# Patient Record
Sex: MALE | Race: WHITE | Employment: OTHER | URBAN - METROPOLITAN AREA
[De-identification: names, ages, dates, MRNs, and addresses within clinical notes are randomized per-mention and may not be internally consistent; named-entity substitution may affect disease eponyms.]

---

## 2020-12-06 ENCOUNTER — APPOINTMENT (OUTPATIENT)
Dept: GENERAL RADIOLOGY | Age: 75
DRG: 234 | End: 2020-12-06
Payer: MEDICARE

## 2020-12-06 ENCOUNTER — HOSPITAL ENCOUNTER (INPATIENT)
Age: 75
LOS: 6 days | Discharge: HOME HEALTH | DRG: 234 | End: 2020-12-14
Attending: EMERGENCY MEDICINE | Admitting: THORACIC SURGERY (CARDIOTHORACIC VASCULAR SURGERY)
Payer: MEDICARE

## 2020-12-06 PROBLEM — R07.9 CHEST PAIN: Status: ACTIVE | Noted: 2020-12-06

## 2020-12-06 LAB
ALBUMIN SERPL-MCNC: 3.5 G/DL (ref 3.5–5.2)
ALP BLD-CCNC: 193 U/L (ref 40–129)
ALT SERPL-CCNC: 35 U/L (ref 0–40)
ANION GAP SERPL CALCULATED.3IONS-SCNC: 9 MMOL/L (ref 7–16)
AST SERPL-CCNC: 24 U/L (ref 0–39)
BASOPHILS ABSOLUTE: 0.06 E9/L (ref 0–0.2)
BASOPHILS RELATIVE PERCENT: 0.4 % (ref 0–2)
BILIRUB SERPL-MCNC: <0.2 MG/DL (ref 0–1.2)
BUN BLDV-MCNC: 15 MG/DL (ref 8–23)
CALCIUM SERPL-MCNC: 9.4 MG/DL (ref 8.6–10.2)
CHLORIDE BLD-SCNC: 104 MMOL/L (ref 98–107)
CO2: 26 MMOL/L (ref 22–29)
CREAT SERPL-MCNC: 0.9 MG/DL (ref 0.7–1.2)
EOSINOPHILS ABSOLUTE: 0.38 E9/L (ref 0.05–0.5)
EOSINOPHILS RELATIVE PERCENT: 2.8 % (ref 0–6)
GFR AFRICAN AMERICAN: >60
GFR NON-AFRICAN AMERICAN: >60 ML/MIN/1.73
GLUCOSE BLD-MCNC: 146 MG/DL (ref 74–99)
HCT VFR BLD CALC: 35 % (ref 37–54)
HEMOGLOBIN: 11.6 G/DL (ref 12.5–16.5)
IMMATURE GRANULOCYTES #: 0.07 E9/L
IMMATURE GRANULOCYTES %: 0.5 % (ref 0–5)
INR BLD: 1.1
LYMPHOCYTES ABSOLUTE: 2.23 E9/L (ref 1.5–4)
LYMPHOCYTES RELATIVE PERCENT: 16.2 % (ref 20–42)
MCH RBC QN AUTO: 31.3 PG (ref 26–35)
MCHC RBC AUTO-ENTMCNC: 33.1 % (ref 32–34.5)
MCV RBC AUTO: 94.3 FL (ref 80–99.9)
MONOCYTES ABSOLUTE: 0.77 E9/L (ref 0.1–0.95)
MONOCYTES RELATIVE PERCENT: 5.6 % (ref 2–12)
NEUTROPHILS ABSOLUTE: 10.27 E9/L (ref 1.8–7.3)
NEUTROPHILS RELATIVE PERCENT: 74.5 % (ref 43–80)
PDW BLD-RTO: 12.5 FL (ref 11.5–15)
PLATELET # BLD: 385 E9/L (ref 130–450)
PMV BLD AUTO: 9.8 FL (ref 7–12)
POTASSIUM SERPL-SCNC: 3.9 MMOL/L (ref 3.5–5)
PROTHROMBIN TIME: 12.1 SEC (ref 9.3–12.4)
RBC # BLD: 3.71 E12/L (ref 3.8–5.8)
SODIUM BLD-SCNC: 139 MMOL/L (ref 132–146)
TOTAL PROTEIN: 7.2 G/DL (ref 6.4–8.3)
TROPONIN: <0.01 NG/ML (ref 0–0.03)
WBC # BLD: 13.8 E9/L (ref 4.5–11.5)

## 2020-12-06 PROCEDURE — 85025 COMPLETE CBC W/AUTO DIFF WBC: CPT

## 2020-12-06 PROCEDURE — 82553 CREATINE MB FRACTION: CPT

## 2020-12-06 PROCEDURE — 99285 EMERGENCY DEPT VISIT HI MDM: CPT

## 2020-12-06 PROCEDURE — 80053 COMPREHEN METABOLIC PANEL: CPT

## 2020-12-06 PROCEDURE — 84484 ASSAY OF TROPONIN QUANT: CPT

## 2020-12-06 PROCEDURE — 93005 ELECTROCARDIOGRAM TRACING: CPT | Performed by: EMERGENCY MEDICINE

## 2020-12-06 PROCEDURE — 2580000003 HC RX 258: Performed by: EMERGENCY MEDICINE

## 2020-12-06 PROCEDURE — 85610 PROTHROMBIN TIME: CPT

## 2020-12-06 PROCEDURE — 71045 X-RAY EXAM CHEST 1 VIEW: CPT

## 2020-12-06 RX ORDER — ACETAMINOPHEN 325 MG/1
650 TABLET ORAL EVERY 6 HOURS PRN
Status: DISCONTINUED | OUTPATIENT
Start: 2020-12-06 | End: 2020-12-07

## 2020-12-06 RX ORDER — SODIUM CHLORIDE 0.9 % (FLUSH) 0.9 %
10 SYRINGE (ML) INJECTION PRN
Status: DISCONTINUED | OUTPATIENT
Start: 2020-12-06 | End: 2020-12-10

## 2020-12-06 RX ORDER — SODIUM CHLORIDE 9 MG/ML
INJECTION, SOLUTION INTRAVENOUS CONTINUOUS
Status: DISCONTINUED | OUTPATIENT
Start: 2020-12-07 | End: 2020-12-07

## 2020-12-06 RX ORDER — SODIUM CHLORIDE 9 MG/ML
INJECTION, SOLUTION INTRAVENOUS CONTINUOUS
Status: DISCONTINUED | OUTPATIENT
Start: 2020-12-06 | End: 2020-12-06

## 2020-12-06 RX ORDER — ONDANSETRON 2 MG/ML
4 INJECTION INTRAMUSCULAR; INTRAVENOUS EVERY 6 HOURS PRN
Status: DISCONTINUED | OUTPATIENT
Start: 2020-12-06 | End: 2020-12-10

## 2020-12-06 RX ORDER — SODIUM CHLORIDE 0.9 % (FLUSH) 0.9 %
10 SYRINGE (ML) INJECTION EVERY 12 HOURS SCHEDULED
Status: DISCONTINUED | OUTPATIENT
Start: 2020-12-07 | End: 2020-12-10

## 2020-12-06 RX ORDER — NITROGLYCERIN 0.4 MG/1
0.4 TABLET SUBLINGUAL EVERY 5 MIN PRN
Status: DISCONTINUED | OUTPATIENT
Start: 2020-12-06 | End: 2020-12-10

## 2020-12-06 RX ORDER — PROMETHAZINE HYDROCHLORIDE 25 MG/1
12.5 TABLET ORAL EVERY 6 HOURS PRN
Status: DISCONTINUED | OUTPATIENT
Start: 2020-12-06 | End: 2020-12-10

## 2020-12-06 RX ORDER — POLYETHYLENE GLYCOL 3350 17 G/17G
17 POWDER, FOR SOLUTION ORAL DAILY PRN
Status: DISCONTINUED | OUTPATIENT
Start: 2020-12-06 | End: 2020-12-10

## 2020-12-06 RX ORDER — ASPIRIN 81 MG/1
324 TABLET, CHEWABLE ORAL ONCE
Status: DISCONTINUED | OUTPATIENT
Start: 2020-12-06 | End: 2020-12-10

## 2020-12-06 RX ORDER — ACETAMINOPHEN 650 MG/1
650 SUPPOSITORY RECTAL EVERY 6 HOURS PRN
Status: DISCONTINUED | OUTPATIENT
Start: 2020-12-06 | End: 2020-12-07

## 2020-12-06 RX ORDER — ASPIRIN 81 MG/1
81 TABLET, CHEWABLE ORAL DAILY
Status: DISCONTINUED | OUTPATIENT
Start: 2020-12-07 | End: 2020-12-10

## 2020-12-06 RX ADMIN — SODIUM CHLORIDE: 9 INJECTION, SOLUTION INTRAVENOUS at 22:28

## 2020-12-06 ASSESSMENT — PAIN DESCRIPTION - LOCATION: LOCATION: CHEST

## 2020-12-06 ASSESSMENT — PAIN SCALES - GENERAL: PAINLEVEL_OUTOF10: 1

## 2020-12-07 PROBLEM — D64.9 ANEMIA: Status: ACTIVE | Noted: 2020-12-07

## 2020-12-07 PROBLEM — I10 HTN (HYPERTENSION): Status: ACTIVE | Noted: 2020-12-07

## 2020-12-07 PROBLEM — D72.829 LEUKOCYTOSIS: Status: ACTIVE | Noted: 2020-12-07

## 2020-12-07 PROBLEM — Z85.048 HISTORY OF RECTAL CANCER: Status: ACTIVE | Noted: 2020-12-07

## 2020-12-07 LAB
ABO/RH: NORMAL
ADENOVIRUS BY PCR: NOT DETECTED
ANION GAP SERPL CALCULATED.3IONS-SCNC: 5 MMOL/L (ref 7–16)
ANTIBODY SCREEN: NORMAL
BORDETELLA PARAPERTUSSIS BY PCR: NOT DETECTED
BORDETELLA PERTUSSIS BY PCR: NOT DETECTED
BUN BLDV-MCNC: 15 MG/DL (ref 8–23)
CALCIUM SERPL-MCNC: 9.3 MG/DL (ref 8.6–10.2)
CHLAMYDOPHILIA PNEUMONIAE BY PCR: NOT DETECTED
CHLORIDE BLD-SCNC: 106 MMOL/L (ref 98–107)
CHOLESTEROL, TOTAL: 165 MG/DL (ref 0–199)
CK MB: 10 NG/ML (ref 0–7.7)
CK MB: 2 NG/ML (ref 0–7.7)
CO2: 27 MMOL/L (ref 22–29)
CORONAVIRUS 229E BY PCR: NOT DETECTED
CORONAVIRUS HKU1 BY PCR: NOT DETECTED
CORONAVIRUS NL63 BY PCR: NOT DETECTED
CORONAVIRUS OC43 BY PCR: NOT DETECTED
CREAT SERPL-MCNC: 0.8 MG/DL (ref 0.7–1.2)
D DIMER: 386 NG/ML DDU
EKG ATRIAL RATE: 61 BPM
EKG ATRIAL RATE: 73 BPM
EKG P AXIS: 36 DEGREES
EKG P AXIS: 58 DEGREES
EKG P-R INTERVAL: 186 MS
EKG P-R INTERVAL: 202 MS
EKG Q-T INTERVAL: 400 MS
EKG Q-T INTERVAL: 410 MS
EKG QRS DURATION: 76 MS
EKG QRS DURATION: 80 MS
EKG QTC CALCULATION (BAZETT): 412 MS
EKG QTC CALCULATION (BAZETT): 440 MS
EKG R AXIS: -4 DEGREES
EKG T AXIS: -5 DEGREES
EKG T AXIS: 6 DEGREES
EKG VENTRICULAR RATE: 61 BPM
EKG VENTRICULAR RATE: 73 BPM
GFR AFRICAN AMERICAN: >60
GFR NON-AFRICAN AMERICAN: >60 ML/MIN/1.73
GLUCOSE BLD-MCNC: 95 MG/DL (ref 74–99)
HCT VFR BLD CALC: 34.2 % (ref 37–54)
HDLC SERPL-MCNC: 44 MG/DL
HEMOGLOBIN: 11.1 G/DL (ref 12.5–16.5)
HUMAN METAPNEUMOVIRUS BY PCR: NOT DETECTED
HUMAN RHINOVIRUS/ENTEROVIRUS BY PCR: NOT DETECTED
INFLUENZA A BY PCR: NOT DETECTED
INFLUENZA B BY PCR: NOT DETECTED
LDL CHOLESTEROL CALCULATED: 104 MG/DL (ref 0–99)
LV EF: 60 %
LV EF: 65 %
LVEF MODALITY: NORMAL
LVEF MODALITY: NORMAL
MCH RBC QN AUTO: 30.7 PG (ref 26–35)
MCHC RBC AUTO-ENTMCNC: 32.5 % (ref 32–34.5)
MCV RBC AUTO: 94.5 FL (ref 80–99.9)
MYCOPLASMA PNEUMONIAE BY PCR: NOT DETECTED
PARAINFLUENZA VIRUS 1 BY PCR: NOT DETECTED
PARAINFLUENZA VIRUS 2 BY PCR: NOT DETECTED
PARAINFLUENZA VIRUS 3 BY PCR: NOT DETECTED
PARAINFLUENZA VIRUS 4 BY PCR: NOT DETECTED
PDW BLD-RTO: 12.6 FL (ref 11.5–15)
PLATELET # BLD: 376 E9/L (ref 130–450)
PMV BLD AUTO: 9.7 FL (ref 7–12)
POC ACT LR: 144 SECONDS
POC ACT LR: 242 SECONDS
POTASSIUM REFLEX MAGNESIUM: 3.9 MMOL/L (ref 3.5–5)
RBC # BLD: 3.62 E12/L (ref 3.8–5.8)
RESPIRATORY SYNCYTIAL VIRUS BY PCR: NOT DETECTED
SARS-COV-2, PCR: NOT DETECTED
SODIUM BLD-SCNC: 138 MMOL/L (ref 132–146)
TOTAL CK: 86 U/L (ref 20–200)
TRIGL SERPL-MCNC: 85 MG/DL (ref 0–149)
TROPONIN: 0.18 NG/ML (ref 0–0.03)
TROPONIN: 0.2 NG/ML (ref 0–0.03)
VLDLC SERPL CALC-MCNC: 17 MG/DL
WBC # BLD: 9.8 E9/L (ref 4.5–11.5)

## 2020-12-07 PROCEDURE — 86850 RBC ANTIBODY SCREEN: CPT

## 2020-12-07 PROCEDURE — 93306 TTE W/DOPPLER COMPLETE: CPT

## 2020-12-07 PROCEDURE — C1894 INTRO/SHEATH, NON-LASER: HCPCS

## 2020-12-07 PROCEDURE — 80048 BASIC METABOLIC PNL TOTAL CA: CPT

## 2020-12-07 PROCEDURE — 80061 LIPID PANEL: CPT

## 2020-12-07 PROCEDURE — 82550 ASSAY OF CK (CPK): CPT

## 2020-12-07 PROCEDURE — 82553 CREATINE MB FRACTION: CPT

## 2020-12-07 PROCEDURE — 99205 OFFICE O/P NEW HI 60 MIN: CPT | Performed by: INTERNAL MEDICINE

## 2020-12-07 PROCEDURE — C1769 GUIDE WIRE: HCPCS

## 2020-12-07 PROCEDURE — C1887 CATHETER, GUIDING: HCPCS

## 2020-12-07 PROCEDURE — 93458 L HRT ARTERY/VENTRICLE ANGIO: CPT | Performed by: INTERNAL MEDICINE

## 2020-12-07 PROCEDURE — APPSS60 APP SPLIT SHARED TIME 46-60 MINUTES: Performed by: NURSE PRACTITIONER

## 2020-12-07 PROCEDURE — 4A023N7 MEASUREMENT OF CARDIAC SAMPLING AND PRESSURE, LEFT HEART, PERCUTANEOUS APPROACH: ICD-10-PCS | Performed by: INTERNAL MEDICINE

## 2020-12-07 PROCEDURE — 93571 IV DOP VEL&/PRESS C FLO 1ST: CPT | Performed by: INTERNAL MEDICINE

## 2020-12-07 PROCEDURE — 2500000003 HC RX 250 WO HCPCS

## 2020-12-07 PROCEDURE — 84484 ASSAY OF TROPONIN QUANT: CPT

## 2020-12-07 PROCEDURE — 85347 COAGULATION TIME ACTIVATED: CPT

## 2020-12-07 PROCEDURE — 86900 BLOOD TYPING SEROLOGIC ABO: CPT

## 2020-12-07 PROCEDURE — 93010 ELECTROCARDIOGRAM REPORT: CPT | Performed by: INTERNAL MEDICINE

## 2020-12-07 PROCEDURE — G0378 HOSPITAL OBSERVATION PER HR: HCPCS

## 2020-12-07 PROCEDURE — 6360000002 HC RX W HCPCS

## 2020-12-07 PROCEDURE — 0202U NFCT DS 22 TRGT SARS-COV-2: CPT

## 2020-12-07 PROCEDURE — 6370000000 HC RX 637 (ALT 250 FOR IP): Performed by: INTERNAL MEDICINE

## 2020-12-07 PROCEDURE — 93572 IV DOP VEL&/PRESS C FLO EA: CPT | Performed by: INTERNAL MEDICINE

## 2020-12-07 PROCEDURE — 86923 COMPATIBILITY TEST ELECTRIC: CPT

## 2020-12-07 PROCEDURE — 36415 COLL VENOUS BLD VENIPUNCTURE: CPT

## 2020-12-07 PROCEDURE — 93005 ELECTROCARDIOGRAM TRACING: CPT | Performed by: FAMILY MEDICINE

## 2020-12-07 PROCEDURE — 85378 FIBRIN DEGRADE SEMIQUANT: CPT

## 2020-12-07 PROCEDURE — 85027 COMPLETE CBC AUTOMATED: CPT

## 2020-12-07 PROCEDURE — 2580000003 HC RX 258: Performed by: FAMILY MEDICINE

## 2020-12-07 PROCEDURE — 86901 BLOOD TYPING SEROLOGIC RH(D): CPT

## 2020-12-07 PROCEDURE — B2111ZZ FLUOROSCOPY OF MULTIPLE CORONARY ARTERIES USING LOW OSMOLAR CONTRAST: ICD-10-PCS | Performed by: INTERNAL MEDICINE

## 2020-12-07 PROCEDURE — 2709999900 HC NON-CHARGEABLE SUPPLY

## 2020-12-07 PROCEDURE — 6360000002 HC RX W HCPCS: Performed by: FAMILY MEDICINE

## 2020-12-07 PROCEDURE — 6370000000 HC RX 637 (ALT 250 FOR IP): Performed by: FAMILY MEDICINE

## 2020-12-07 RX ORDER — ACETAMINOPHEN 650 MG/1
650 SUPPOSITORY RECTAL EVERY 6 HOURS PRN
Status: DISCONTINUED | OUTPATIENT
Start: 2020-12-07 | End: 2020-12-10

## 2020-12-07 RX ORDER — ACETAMINOPHEN 325 MG/1
650 TABLET ORAL EVERY 6 HOURS PRN
Status: DISCONTINUED | OUTPATIENT
Start: 2020-12-07 | End: 2020-12-10

## 2020-12-07 RX ORDER — METOPROLOL SUCCINATE 25 MG/1
25 TABLET, EXTENDED RELEASE ORAL DAILY
Status: DISCONTINUED | OUTPATIENT
Start: 2020-12-07 | End: 2020-12-08

## 2020-12-07 RX ORDER — ATORVASTATIN CALCIUM 40 MG/1
40 TABLET, FILM COATED ORAL NIGHTLY
Status: DISCONTINUED | OUTPATIENT
Start: 2020-12-07 | End: 2020-12-10

## 2020-12-07 RX ORDER — ACETAMINOPHEN 325 MG/1
650 TABLET ORAL EVERY 4 HOURS PRN
Status: DISCONTINUED | OUTPATIENT
Start: 2020-12-07 | End: 2020-12-07 | Stop reason: SDUPTHER

## 2020-12-07 RX ORDER — AMLODIPINE BESYLATE 5 MG/1
5 TABLET ORAL DAILY
Status: ON HOLD | COMMUNITY
End: 2020-12-14 | Stop reason: HOSPADM

## 2020-12-07 RX ADMIN — ATORVASTATIN CALCIUM 40 MG: 40 TABLET, FILM COATED ORAL at 21:56

## 2020-12-07 RX ADMIN — ASPIRIN 81 MG CHEWABLE TABLET 81 MG: 81 TABLET CHEWABLE at 09:56

## 2020-12-07 RX ADMIN — SODIUM CHLORIDE, PRESERVATIVE FREE 10 ML: 5 INJECTION INTRAVENOUS at 09:56

## 2020-12-07 RX ADMIN — ENOXAPARIN SODIUM 40 MG: 40 INJECTION SUBCUTANEOUS at 09:56

## 2020-12-07 RX ADMIN — SODIUM CHLORIDE, PRESERVATIVE FREE 10 ML: 5 INJECTION INTRAVENOUS at 21:56

## 2020-12-07 RX ADMIN — METOPROLOL SUCCINATE 25 MG: 25 TABLET, EXTENDED RELEASE ORAL at 11:53

## 2020-12-07 ASSESSMENT — PAIN SCALES - GENERAL
PAINLEVEL_OUTOF10: 0

## 2020-12-07 NOTE — PLAN OF CARE
Problem: Pain:  Goal: Pain level will decrease  Description: Pain level will decrease  12/7/2020 0831 by Kelly Temple RN  Outcome: Met This Shift

## 2020-12-07 NOTE — ED PROVIDER NOTES
rigidity. No pulsatile masses appreciated. Musculoskeletal: Moves all extremities x 4. Warm and well perfused, no clubbing, cyanosis, or edema. Capillary refill <3 seconds  Skin: warm and dry. No rashes. Neurologic: GCS 15, CN 2-12 grossly intact, no focal deficits, symmetric strength 5/5 in the upper and lower extremities bilaterally  Psych: Normal Affect    -------------------------------------------------- RESULTS -------------------------------------------------  I have personally reviewed all laboratory and imaging results for this patient. Results are listed below.      LABS:  Results for orders placed or performed during the hospital encounter of 12/06/20   CBC auto differential   Result Value Ref Range    WBC 13.8 (H) 4.5 - 11.5 E9/L    RBC 3.71 (L) 3.80 - 5.80 E12/L    Hemoglobin 11.6 (L) 12.5 - 16.5 g/dL    Hematocrit 35.0 (L) 37.0 - 54.0 %    MCV 94.3 80.0 - 99.9 fL    MCH 31.3 26.0 - 35.0 pg    MCHC 33.1 32.0 - 34.5 %    RDW 12.5 11.5 - 15.0 fL    Platelets 900 983 - 351 E9/L    MPV 9.8 7.0 - 12.0 fL    Neutrophils % 74.5 43.0 - 80.0 %    Immature Granulocytes % 0.5 0.0 - 5.0 %    Lymphocytes % 16.2 (L) 20.0 - 42.0 %    Monocytes % 5.6 2.0 - 12.0 %    Eosinophils % 2.8 0.0 - 6.0 %    Basophils % 0.4 0.0 - 2.0 %    Neutrophils Absolute 10.27 (H) 1.80 - 7.30 E9/L    Immature Granulocytes # 0.07 E9/L    Lymphocytes Absolute 2.23 1.50 - 4.00 E9/L    Monocytes Absolute 0.77 0.10 - 0.95 E9/L    Eosinophils Absolute 0.38 0.05 - 0.50 E9/L    Basophils Absolute 0.06 0.00 - 0.20 E9/L   Comprehensive Metabolic Panel   Result Value Ref Range    Sodium 139 132 - 146 mmol/L    Potassium 3.9 3.5 - 5.0 mmol/L    Chloride 104 98 - 107 mmol/L    CO2 26 22 - 29 mmol/L    Anion Gap 9 7 - 16 mmol/L    Glucose 146 (H) 74 - 99 mg/dL    BUN 15 8 - 23 mg/dL    CREATININE 0.9 0.7 - 1.2 mg/dL    GFR Non-African American >60 >=60 mL/min/1.73    GFR African American >60     Calcium 9.4 8.6 - 10.2 mg/dL    Total Protein 7.2 6.4 - 8.3 g/dL    Alb 3.5 3.5 - 5.2 g/dL    Total Bilirubin <0.2 0.0 - 1.2 mg/dL    Alkaline Phosphatase 193 (H) 40 - 129 U/L    ALT 35 0 - 40 U/L    AST 24 0 - 39 U/L   Troponin   Result Value Ref Range    Troponin <0.01 0.00 - 0.03 ng/mL   Protime-INR   Result Value Ref Range    Protime 12.1 9.3 - 12.4 sec    INR 1.1    EKG 12 Lead   Result Value Ref Range    Ventricular Rate 73 BPM    Atrial Rate 73 BPM    P-R Interval 202 ms    QRS Duration 80 ms    Q-T Interval 400 ms    QTc Calculation (Bazett) 440 ms    P Axis 36 degrees    T Axis -5 degrees       RADIOLOGY:  Interpreted by Radiologist.  XR CHEST PORTABLE   Final Result   No acute process. EKG:  This EKG is signed and interpreted by me. Rate: 73  Rhythm: Sinus  Interpretation: no acute changes  Comparison: no previous EKG available        ------------------------- NURSING NOTES AND VITALS REVIEWED ---------------------------   The nursing notes within the ED encounter and vital signs as below have been reviewed by myself. /74   Pulse 60   Temp 96.5 °F (35.8 °C) (Tympanic)   Resp 15   SpO2 99%   Oxygen Saturation Interpretation: Normal    The patients available past medical records and past encounters were reviewed. ------------------------------ ED COURSE/MEDICAL DECISION MAKING----------------------  Medications   0.9 % sodium chloride infusion ( Intravenous New Bag 12/6/20 2228)   aspirin chewable tablet 324 mg (324 mg Oral Not Given 12/6/20 2225)             Medical Decision Making:    Presenting because of left-sided chest pain that started short time prior to arrival.  Having no active pain right now. Patient has has no history of coronary disease. Patient having no leg pain no cough no fever. Patient neurologically intact. EKG shows no acute ST elevation. Plan will be to admit to monitored bed    Re-Evaluations:             Re-evaluation.   Patients symptoms show no change  Reevaluated at 11:30 PM.  Patient in no distress having no active pain vital signs stable. Patient made aware of findings and plan. Plan will be to admit. Consultations:               Internal medicine  Critical Care: This patient's ED course included: a personal history and physicial eaxmination    This patient has been closely monitored during their ED course. Counseling: The emergency provider has spoken with the patient and discussed todays results, in addition to providing specific details for the plan of care and counseling regarding the diagnosis and prognosis. Questions are answered at this time and they are agreeable with the plan.       --------------------------------- IMPRESSION AND DISPOSITION ---------------------------------    IMPRESSION  1. Chest pain, unspecified type        DISPOSITION  Disposition: Admit to telemetry  Patient condition is stable        NOTE: This report was transcribed using voice recognition software.  Every effort was made to ensure accuracy; however, inadvertent computerized transcription errors may be present          Britt Atkins MD  12/06/20 8763       Britt Atkins MD  12/06/20 2842

## 2020-12-07 NOTE — CONSULTS
Inpatient Cardiology Consultation      Reason for Consult:  NSTEMI    Consulting Physician: Dr. Alexis Overton    Requesting Physician:  Dr. Nancy Fajardo    Date of Consultation: 12/7/2020    HISTORY OF PRESENT ILLNESS:   Mr. Sierra Raphael is a 70year old  male who is previously not known to [facility]) Cardiology Physicians in [facility]. He follows with the [place]. His medical history includes HTN, remote tobacco abuse, and rectal CA s/p surgical resection with chemo and radiation in 2008. Mr. Priscilla Erickson presented to [facility] ED on 12/06/2020 with complaints of chest pain. He states that prior to presentation he was sitting on a chair after he finished a bowl of icecream and developed midsternal chest \"pressure\" that lasted 20 minutes in duration with accompanying dizziness without LOC, or palpitations. He denies accompanying dyspnea. Upon arrival to the ED his VS were 96.5-80-% on RA-128/69. EKG SR. WBC 13.8. H&H 11.6/35. K 3.9. BUN/SCr 15/0.9. Troponin <0.01. CXR was unremarkable. He received NS, and ASA 324mg. Repeat Troponin was 0.20. He was admitted to a telemetry monitored unit. Cardiology was consulted by Dr. Nancy Fajardo for management of NSTEMI. Please note: past medical records were reviewed per electronic medical record (EMR) - see detailed reports under Past Medical/ Surgical History. Past Medical and Surgical History:    1. HTN  2. Remote tobacco abuse  3. Squamous Cell Rectal CA s/p surgery, chemo, radiation in 2008 per patient (records unavailable at this time)    Medications Prior to admit:  Prior to Admission medications    Medication Sig Start Date End Date Taking?  Authorizing Provider   amLODIPine (NORVASC) 5 MG tablet Take 5 mg by mouth daily   Yes Historical Provider, MD       Current Medications:    Current Facility-Administered Medications: regadenoson (LEXISCAN) injection 0.4 mg, 0.4 mg, Intravenous, ONCE PRN  aspirin chewable tablet 324 mg, 324 mg, Oral, Once  sodium chloride flush 0.9 % injection 10 mL, 10 mL, Intravenous, 2 times per day  sodium chloride flush 0.9 % injection 10 mL, 10 mL, Intravenous, PRN  acetaminophen (TYLENOL) tablet 650 mg, 650 mg, Oral, Q6H PRN **OR** acetaminophen (TYLENOL) suppository 650 mg, 650 mg, Rectal, Q6H PRN  polyethylene glycol (GLYCOLAX) packet 17 g, 17 g, Oral, Daily PRN  promethazine (PHENERGAN) tablet 12.5 mg, 12.5 mg, Oral, Q6H PRN **OR** ondansetron (ZOFRAN) injection 4 mg, 4 mg, Intravenous, Q6H PRN  nitroGLYCERIN (NITROSTAT) SL tablet 0.4 mg, 0.4 mg, Sublingual, Q5 Min PRN  aspirin chewable tablet 81 mg, 81 mg, Oral, Daily  enoxaparin (LOVENOX) injection 40 mg, 40 mg, Subcutaneous, Daily    Allergies:  Patient has no known allergies. Social History:    Quit smoking in 2000. Prior to that smoked 1.5ppd for 30 years. Drinks 2-3 beers a day. Denies illicit drug use. Caffeine intake includes one pot of coffee a day. Family History:   Please note this information was not obtained at this time as it is limited in nature due to the patient's advanced age. REVIEW OF SYSTEMS:     · Constitutional: Denies fevers, chills, night sweats, and fatigue  · HEENT: Denies headaches, nose bleeds, and blurred vision,oral pain, abscess or lesion. · Musculoskeletal: Denies falls, pain to BLE with ambulation and edema to BLE. · Neurological: Denies dizziness and lightheadedness, numbness and tingling  · Cardiovascular: Complains of chest pain with palpitations-see HPI. · Respiratory: Denies orthopnea and PND  · Gastrointestinal: Denies heartburn, nausea/vomiting, diarrhea and constipation, black/bloody, and tarry stools. · Genitourinary: Denies dysuria and hematuria  · Hematologic: Denies excessive bruising or bleeding  · Lymphatic: Denies lumps and bumps to neck, axilla, breast, and groin  · Endocrine: Denies excessive thirst. Denies intolerance to hot and cold  · Psychiatric: Denies anxiety and depression.     PHYSICAL EXAM:   /65   Pulse 58 Temp 98.4 °F (36.9 °C) (Temporal)   Resp 16   Ht 5' 10\" (1.778 m)   Wt 205 lb (93 kg)   SpO2 98%   BMI 29.41 kg/m²   CONST:  Well developed, well nourished elderly male who appears stated age. Awake, alert, cooperative, no apparent distress  HEENT:   Head- Normocephalic, atraumatic   Eyes- Conjunctivae pink, anicteric  Throat- Oral mucosa pink and moist  Neck-  No stridor, trachea midline, no jugular venous distention. No adenopathy   CHEST: Chest symmetrical and non-tender to palpation. No accessory muscle use or intercostal retractions  RESPIRATORY: Lung sounds - clear throughout fields   CARDIOVASCULAR:     No carotid bruit  Heart Inspection- shows no noted pulsations  Heart Palpation- no heaves or thrills; PMI is non-displaced   Heart Ausculation- Regular rate and rhythm, no murmur. No s3, s4 or rub   PV: No lower extremity edema. No varicosities. Pedal pulses palpable, no clubbing or cyanosis   ABDOMEN: Soft, non-tender to light palpation. Bowel sounds present. No palpable masses no organomegaly; no abdominal bruit  MS: Good muscle strength and tone. No atrophy or abnormal movements. : Deferred  SKIN: Warm and dry no statis dermatitis or ulcers   NEURO / PSYCH: Oriented to person, place and time. Speech clear and appropriate. Follows all commands.  Pleasant affect     DATA:    ECG: As above  Tele strips: SR  Diagnostic:      Intake/Output Summary (Last 24 hours) at 12/7/2020 0955  Last data filed at 12/7/2020 7483  Gross per 24 hour   Intake 0 ml   Output --   Net 0 ml       Labs:   CBC:   Recent Labs     12/06/20 2223 12/07/20 0615   WBC 13.8* 9.8   HGB 11.6* 11.1*   HCT 35.0* 34.2*    376     BMP:   Recent Labs     12/06/20 2223 12/07/20 0615    138   K 3.9 3.9   CO2 26 27   BUN 15 15   CREATININE 0.9 0.8   LABGLOM >60 >60   CALCIUM 9.4 9.3   PT/INR:   Recent Labs     12/06/20 2223   PROTIME 12.1   INR 1.1   CARDIAC ENZYMES:  Recent Labs     12/06/20 2223 12/07/20 0615   CKTOTAL --  86   CKMB 2.0 10.0*   TROPONINI <0.01 0.20*     FASTING LIPID PANEL:  Lab Results   Component Value Date    CHOL 165 12/07/2020    HDL 44 12/07/2020    LDLCALC 104 12/07/2020    TRIG 85 12/07/2020     LIVER PROFILE:  Recent Labs     12/06/20  2223   AST 24   ALT 35   LABALBU 3.5     12/06/2020 CXR:  No acute process. IMPRESSION and PLAN to follow per Dr. Kasi Nolan    Electronically signed by ARGENTINA Alexandra CNP on 12/7/2020 at 9:55 AM        The above documentation has been prepared under my direction and personally reviewed by me in its entirety. I confirm that the note above accurately reflects all work, treatment, procedures, and medical decision making performed by me.     The patient's history was independently obtained. The patient was independently examined. Electrocardiogram, prior and present cardiovascular assessment, and laboratory studies were reviewed.     The patient is a 26-year-old white male with no association to [facility] Cardiology and primary medical care provided by the [employer]. He has no known history of structural heart disease and a risk profile of hypertension and prior tobacco consumption with associated chronic obstructive lung disease in addition to that of carcinoma of the colon. He is normally active with functional capabilities of approximately 5 METs and over the past year upon significant cold exposure has noted a somewhat ill-defined precordial chest discomfort without radiation or associated ischemic equivalents and spontaneously terminated by the cessation of activity. On the day of his hospitalization at rest, he noted the onset of a precordial pressure-like chest discomfort without radiation associated with that of diaphoresis with a duration of approximately 15 to 20 minutes and without recurrence.   At the time of his emergency room evaluation, a resting electrocardiogram demonstrated evidence of sinus rhythm with nonspecific ST changes with initial normal cardiac biomarkers and a repeat tracing demonstrating resolution of nonspecific ST changes but evolution of a non-ST elevation myocardial infarction by troponin levels with a repeat troponin of 0.2 ng/mL. He otherwise denies any symptoms of decompensated left ventricular systolic dysfunction or volume overload nor arrhythmia related symptoms and has experienced no similar symptoms during hospitalization.     At the time of evaluation, the patient's medications and allergies were reviewed as well as that of his past medical history and review of systems.     On examination, he appears in no discomfort no distress and is hemodynamically stable with vital signs as documented. Jugular venous pressure is normal with no identified carotid bruits. Lung fields are clear to auscultation. Examination is no for a regular rate and rhythm no gallop rhythm or cardiac murmur. A benign abdominal examination is present no peripheral edema identified.     Diagnostic Assessment and Plan: On a clinical basis, the patient presents with symptoms of unstable angina and biomarker evidence of a non-ST elevation myocardial infarction in the face of multiple coronary disease risk factors. Present initial medical stabilization will be performed inclusive of aspirin, a beta-blocker with need of careful monitoring of blood pressure and heart rate as well as that of high-dose atorvastatin and low molecular weight heparin with plans of an echocardiogram to assess the presence or absence of regional wall motion abnormalities and based on his presentation plans of coronary angiography for further definition of his coronary anatomy to guide additional management. At the time of evaluation the angiography procedure was discussed in detail including that of proposed benefits, risks and alternatives and he is agreeable in proceeding.   Independent of findings, aggressive risk factor modification of blood pressure and serum lipids will be essential to reducing risk of future atherosclerotic development.     Thank you for allowing me to participate in your patient's care. Please feel free to contact me if you have any questions or concerns.     Ja Remy, [facility]

## 2020-12-07 NOTE — CARE COORDINATION
12/7, Patient off floor for testing. SW/CM to follow up for further discharge planning needs.   Cherelle Fatima, [address] [facility]

## 2020-12-07 NOTE — CONSULTS
The above documentation has been prepared under my direction and personally reviewed by me in its entirety. I confirm that the note above accurately reflects all work, treatment, procedures, and medical decision making performed by me. The patient's history was independently obtained. The patient was independently examined. Electrocardiogram, prior and present cardiovascular assessment, and laboratory studies were reviewed. The patient is a 72-year-old white male with no association to [facility]iology and primary medical care provided by the [employer]. He has no known history of structural heart disease and a risk profile of hypertension and prior tobacco consumption with associated chronic obstructive lung disease in addition to that of carcinoma of the colon. He is normally active with functional capabilities of approximately 5 METs and over the past year upon significant cold exposure has noted a somewhat ill-defined precordial chest discomfort without radiation or associated ischemic equivalents and spontaneously terminated by the cessation of activity. On the day of his hospitalization at rest, he noted the onset of a precordial pressure-like chest discomfort without radiation associated with that of diaphoresis with a duration of approximately 15 to 20 minutes and without recurrence. At the time of his emergency room evaluation, a resting electrocardiogram demonstrated evidence of sinus rhythm with nonspecific ST changes with initial normal cardiac biomarkers and a repeat tracing demonstrating resolution of nonspecific ST changes but evolution of a non-ST elevation myocardial infarction by troponin levels with a repeat troponin of 0.2 ng/mL. He otherwise denies any symptoms of decompensated left ventricular systolic dysfunction or volume overload nor arrhythmia related symptoms and has experienced no similar symptoms during hospitalization.     At the time of evaluation, the patient's medications and allergies were reviewed as well as that of his past medical history and review of systems. On examination, he appears in no discomfort no distress and is hemodynamically stable with vital signs as documented. Jugular venous pressure is normal with no identified carotid bruits. Lung fields are clear to auscultation. Examination is no for a regular rate and rhythm no gallop rhythm or cardiac murmur. A benign abdominal examination is present no peripheral edema identified. Diagnostic Assessment and Plan: On a clinical basis, the patient presents with symptoms of unstable angina and biomarker evidence of a non-ST elevation myocardial infarction in the face of multiple coronary disease risk factors. Present initial medical stabilization will be performed inclusive of aspirin, a beta-blocker with need of careful monitoring of blood pressure and heart rate as well as that of high-dose atorvastatin and low molecular weight heparin with plans of an echocardiogram to assess the presence or absence of regional wall motion abnormalities and based on his presentation plans of coronary angiography for further definition of his coronary anatomy to guide additional management. At the time of evaluation the angiography procedure was discussed in detail including that of proposed benefits, risks and alternatives and he is agreeable in proceeding. Independent of findings, aggressive risk factor modification of blood pressure and serum lipids will be essential to reducing risk of future atherosclerotic development. Thank you for allowing me to participate in your patient's care. Please feel free to contact me if you have any questions or concerns. Ever Rowe.  [facility], [place], [address] [place] [address] Cardiology

## 2020-12-07 NOTE — PROCEDURES
Procedure:    1. Left heart cath    Physician: Aldo Hood DO. Assistant: none    Indication: NSTEMI  AUC: 8  AUC indication: 4    Complication: None. Sedation: Intravenous Versed. Anesthesia: Xylocaine, fentanyl     Sedation time: I was present for sedation and ministration [id number] I ended sedation at 1650for a total face-to-face sedation time of 40 min. Estimated blood loss: Minimal    Specimens: none    Contrast used: 75cc    Hemodynamics:  Opening Aortic pressure: 539/35  LV systolic pressure: 051  LVEDP: 19  No significant gradient across the aortic valve  FFR of the left main: 0.44    Angiographic Results/findings:  Left Main: Tortuous. Distal 50%. FFR 0.44  LAD: Minimal diffuse luminal irregularities. D1: Ostial 80%  D2: No angiographically significant stenosis. 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 RCA: Dominant. Proximal eccentric 75%. Proximal to mid diffuse 70%. Mid eccentric 50%. Proximal diffuse 30%. PDA: Proximal diffuse 30%. PLB: Proximal to mid 80%. LV: Normal LV size and systolic function. No wall motion abnormalities. Ejection fraction 60%    Procedure:   After obtaining informed consent the patient was taken to the cardiac Cath Lab where the area over the right radial artery was prepped and draped in a sterile fashion. Using ultrasound guidance and a micropuncture technique a 6 [place] slender glide sheath was placed in the right radial artery. This was aspirated & flushed several times throughout the procedure. This was medicated with verapamil and nitroglycerin. They were given heparin systemically. A 5 [place] TIG catheter was advanced over a wire to the root of the aorta. It was aspirated & flushed with saline. Pressures were obtained. It was filled with contrast.  This was then manipulated into the left main coronary artery. 4 orthogonal views were obtained.   A TIG catheter was then manipulated into the right coronary artery and 3 orthogonal views were then obtained. A 5 [place]ian angled pigtail was then advanced & manipulated into the left ventricle. This was then aspirated & flushed with saline & pressures were obtained. An BLANK view was then obtained. The catheter was then aspirated & flushed with saline once again & pull back pressures were then obtained across the aortic vlave. The distal left main was felt to be borderline. Therefore it was interrogated with an IFR and FFR. A 6 [place]ian L3 0.5 guiding catheter was used. He was anticoagulated with heparin to maintain an ACT greater than 200. A pressure wire was prepped outside the body. This was then advanced and normalized at the tip of the guide catheter with the guide catheter in the aorta. The pressure wire was then advanced across the distal left main stenosis and into the proximal LAD. The guide catheter was pulled back into the aorta.  3 IFR's were performed which were borderline but negative. An FFR was then performed after 1.5 minutes of adenosine infusion it was noted that the guide catheter and pressure wire had migrated backward and was no longer across the lesion. Therefore the wire was advanced across the lesion once again and with the adenosine running the FFR then immediately reduced and persisted at 0.44 which was significantly positive. The right radial artery sheath was removed and a vasc band was then placed with good patent hemostasis. They tolerated the procedure well with no complications. Note: This report was completed using computerized voice recognition software. Every effort has been made to ensure accuracy, however; and invert and computerized transcription errors may be present.

## 2020-12-07 NOTE — H&P
Hospital Medicine History & Physical      PCP: No primary care provider on file. Date of Admission: 2020    Date of Service: Pt seen/examined on 2020 and Placed in Observation. Chief Complaint:  Chest pain      History Of Present Illness:      70 y.o. male who presented to [facility] with past medical history of colorectal cancer status post colectomy in , chemotherapy and radiation, hypertension and former tobacco use. Patient was in his usual state of health until around 7 PM when he started having left-sided chest pain. He just got done eating ice cream when the pain started. Pain was described as achy, \"someone pushing on my chest\", 5 out of 10, nonradiating, lasted about 15 to 20 minutes, associated with lightheadedness and diaphoresis. Got some relief after taking aspirin. He denies any cough, shortness of breath, nausea or vomiting. No palpitations. No fever. No sick contacts. No leg swelling. No bowel complaints. Does get urinary hesitancy and frequency at times. No dysuria. Vital signs notable for respiratory rate of 24, labs showed white count of 15.8, hemoglobin 11.6, INR 1.1 and negative troponin. Chest x-ray is negative. EKG shows normal sinus rhythm rate of 73. He has never had an ischemic work-up. He is being admitted for further management. Past Medical History:      History reviewed. No pertinent past medical history. Above    Past Surgical History: Above    History reviewed. No pertinent surgical history. Medications Prior to Admission: Medication list currently unavailable. Prior to Admission medications    Not on File       Allergies:  Patient has no known allergies. Social History:      The patient currently lives at home with family  TOBACCO:   has no history on file for tobacco.  ETOH:   has no history on file for alcohol.       Family History:     Reviewed in detail Positive as follows: Sister  this year from heart related complications. REVIEW OF SYSTEMS:   Pertinent positives as noted in the HPI. All other systems reviewed and negative. PHYSICAL EXAM:    /74   Pulse 60   Temp 96.5 °F (35.8 °C) (Tympanic)   Resp 15   SpO2 99%     General appearance: Elderly male, well-appearing, no apparent distress, appears stated age and cooperative. Afebrile. HEENT:  Normal cephalic, atraumatic without obvious deformity. Pupils equal, round, and reactive to light. Extra ocular muscles intact. Conjunctivae/corneas clear. Neck: Supple, with full range of motion. No jugular venous distention. Trachea midline. Respiratory:  Normal respiratory effort. Clear to auscultation, bilaterally without Rales/Wheezes/Rhonchi. Cardiovascular:  Regular rate and rhythm with normal S1/S2 without murmurs, rubs or gallops. Abdomen: Soft, non-tender, non-distended with normal bowel sounds. Musculoskeletal:  No clubbing, cyanosis or edema bilaterally. Full range of motion without deformity. Skin: Skin color, texture, turgor normal.  No rashes or lesions. Neurologic:  Neurovascularly intact without any focal sensory/motor deficits. Cranial nerves: II-XII intact, grossly non-focal.  Psychiatric:  Alert and oriented, thought content appropriate, normal insight  Capillary Refill: Brisk,< 3 seconds   Peripheral Pulses: +2 palpable, equal bilaterally       Labs:     Recent Labs     12/06/20 2223   WBC 13.8*   HGB 11.6*   HCT 35.0*        Recent Labs     12/06/20 2223      K 3.9      CO2 26   BUN 15   CREATININE 0.9   CALCIUM 9.4     Recent Labs     12/06/20  2223   AST 24   ALT 35   BILITOT <0.2   ALKPHOS 193*     Recent Labs     12/06/20 2223   INR 1.1     Recent Labs     12/06/20 2223   TROPONINI <0.01       Urinalysis:    No results found for: Nicolette Dose, BACTERIA, RBCUA, BLOODU, [address], [address]    Radiology:   Reviewed and documented    XR CHEST PORTABLE   Final Result   No acute process.       NM Cardiac Stress Test Nuclear Imaging    (Results Pending)       ASSESSMENT:    Active Hospital Problems    Diagnosis Date Noted    Anemia [D64.9] 12/07/2020    Leukocytosis [D72.829] 12/07/2020    HTN (hypertension) [I10] 12/07/2020    History of rectal cancer [Z85.048] 12/07/2020    Chest pain [R07.9] 12/06/2020         PLAN:  1. Chest pain rule out acute coronary syndrome, pain sounds atypical however given patient's age and history, we will plan for stress test in the morning. Cycle cardiac enzymes. Check D-dimer to evaluate for PE. Chest x-ray is negative for pneumonia. Aspirin and statin. 2.  Leukocytosis, follow CBC. 3.  Hypertension, resume home medications when available. 4.  Anemia, monitor hemoglobin  5. History of colorectal cancer status post resection, chemotherapy and radiation. DVT Prophylaxis: Lovenox  Diet: Diet NPO, After Midnight  Code Status: Full Code    PT/OT Eval Status: As needed    Dispo -observation/telemetry       Nadine Schwartz MD    Thank you No primary care provider on file. for the opportunity to be involved in this patient's care.

## 2020-12-07 NOTE — ED NOTES
Richard Ruth, [phone], wife would like a call once patient goes to a room upstairs .      [facility]  12/07/20 0125

## 2020-12-07 NOTE — PROGRESS NOTES
Hospitalist Progress Note      PCP: No primary care provider on file. Date of Admission: 12/6/2020  Days in the hospital: [address] [facility] Course:   Patient admitted to hospital yesterday with complaints of chest pain which was atypical.  Troponin was ordered and it is slightly elevated and consultation was placed to cardiology. Subjective  Patient seen and examined at bedside. Denies any chest pain or shortness of breath. COVID-19 test was done and it came back negative. Seen by cardiology and plan is to perform cardiac catheterization hopefully tomorrow  Exam:    /60   Pulse 58   Temp 98.6 °F (37 °C) (Temporal)   Resp 16   Ht 5' 10\" (1.778 m)   Wt 205 lb (93 kg)   SpO2 97%   BMI 29.41 kg/m²     HEENT: No pallor, no icterus. Respiratory:  CTA, good air entry. Cardiovascular: RRR, no murmur. Abdomen: Soft, non-tender, BS noted. Musculoskeletal: No joint pains or joint swelling noted.    Neurologic: awake, alert and following commands       Assessment/Plan:  · Non-ST elevation MI/unstable angina  · Hypertension  · History of rectal cancer  · Anemia    Plan:  · Follow-up with cardiology, scheduled for cardiac cath tomorrow, continue with risk factor modification, continue with aspirin, metoprolol and statin  · Monitor labs  · COVID-19 test came back negative      Labs:   Recent Labs     12/06/20 2223 12/07/20 0615   WBC 13.8* 9.8   HGB 11.6* 11.1*   HCT 35.0* 34.2*    376     Recent Labs     12/06/20 2223 12/07/20 0615    138   K 3.9 3.9    106   CO2 26 27   BUN 15 15   CREATININE 0.9 0.8   CALCIUM 9.4 9.3     Recent Labs     12/06/20 2223   AST 24   ALT 35   BILITOT <0.2   ALKPHOS 193*     Recent Labs     12/06/20 2223   INR 1.1     Recent Labs     12/06/20 2223 12/07/20  0615 12/07/20  1104   CKTOTAL  --  86  --    TROPONINI <0.01 0.20* 0.18*       Medications:  Reviewed    Infusion Medications   Scheduled Medications    metoprolol succinate  25 mg Oral Daily  atorvastatin  40 mg Oral Nightly    [START ON 12/8/2020] enoxaparin  40 mg Subcutaneous Daily    aspirin  324 mg Oral Once    sodium chloride flush  10 mL Intravenous 2 times per day    aspirin  81 mg Oral Daily     PRN Meds: sodium chloride flush, acetaminophen **OR** acetaminophen, polyethylene glycol, promethazine **OR** ondansetron, nitroGLYCERIN      Intake/Output Summary (Last 24 hours) at 12/7/2020 1402  Last data filed at 12/7/2020 1337  Gross per 24 hour   Intake 0 ml   Output 250 ml   Net -250 ml     · Body mass index is 29.41 kg/m². · Diet  · Diet NPO, After Midnight Exceptions are: Sips with Meds    · Code Status  · Full Code         Electronically signed by Cady Chang MD on 12/7/2020 at 2:02 PM  [employer]   Please contact me through [employer]    NOTE: This report was transcribed using voice recognition software. Every effort was made to ensure accuracy; however, inadvertent computerized transcription errors may be present.

## 2020-12-08 ENCOUNTER — APPOINTMENT (OUTPATIENT)
Dept: ULTRASOUND IMAGING | Age: 75
DRG: 234 | End: 2020-12-08
Payer: MEDICARE

## 2020-12-08 ENCOUNTER — APPOINTMENT (OUTPATIENT)
Dept: INTERVENTIONAL RADIOLOGY/VASCULAR | Age: 75
DRG: 234 | End: 2020-12-08
Payer: MEDICARE

## 2020-12-08 ENCOUNTER — APPOINTMENT (OUTPATIENT)
Dept: CT IMAGING | Age: 75
DRG: 234 | End: 2020-12-08
Payer: MEDICARE

## 2020-12-08 LAB
ALBUMIN SERPL-MCNC: 3.7 G/DL (ref 3.5–5.2)
ALP BLD-CCNC: 158 U/L (ref 40–129)
ALT SERPL-CCNC: 27 U/L (ref 0–40)
AMORPHOUS: ABNORMAL
ANION GAP SERPL CALCULATED.3IONS-SCNC: 14 MMOL/L (ref 7–16)
APTT: 28.4 SEC (ref 24.5–35.1)
AST SERPL-CCNC: 20 U/L (ref 0–39)
BACTERIA: ABNORMAL /HPF
BILIRUB SERPL-MCNC: 0.7 MG/DL (ref 0–1.2)
BILIRUBIN URINE: NEGATIVE
BLOOD, URINE: ABNORMAL
BUN BLDV-MCNC: 19 MG/DL (ref 8–23)
CALCIUM SERPL-MCNC: 9.5 MG/DL (ref 8.6–10.2)
CHLORIDE BLD-SCNC: 105 MMOL/L (ref 98–107)
CLARITY: ABNORMAL
CO2: 20 MMOL/L (ref 22–29)
COLOR: YELLOW
CREAT SERPL-MCNC: 1.1 MG/DL (ref 0.7–1.2)
EKG ATRIAL RATE: 76 BPM
EKG P AXIS: 51 DEGREES
EKG P-R INTERVAL: 176 MS
EKG Q-T INTERVAL: 374 MS
EKG QRS DURATION: 80 MS
EKG QTC CALCULATION (BAZETT): 420 MS
EKG R AXIS: -8 DEGREES
EKG T AXIS: -30 DEGREES
EKG VENTRICULAR RATE: 76 BPM
GFR AFRICAN AMERICAN: >60
GFR NON-AFRICAN AMERICAN: >60 ML/MIN/1.73
GLUCOSE BLD-MCNC: 110 MG/DL (ref 74–99)
GLUCOSE URINE: NEGATIVE MG/DL
HBA1C MFR BLD: 5.6 % (ref 4–5.6)
HCT VFR BLD CALC: 35.8 % (ref 37–54)
HEMOGLOBIN: 11.7 G/DL (ref 12.5–16.5)
KETONES, URINE: NEGATIVE MG/DL
LEUKOCYTE ESTERASE, URINE: ABNORMAL
MCH RBC QN AUTO: 30.6 PG (ref 26–35)
MCHC RBC AUTO-ENTMCNC: 32.7 % (ref 32–34.5)
MCV RBC AUTO: 93.7 FL (ref 80–99.9)
NITRITE, URINE: NEGATIVE
PDW BLD-RTO: 12.7 FL (ref 11.5–15)
PH UA: 6 (ref 5–9)
PLATELET # BLD: 384 E9/L (ref 130–450)
PMV BLD AUTO: 10 FL (ref 7–12)
POTASSIUM SERPL-SCNC: 4 MMOL/L (ref 3.5–5)
PROTEIN UA: 100 MG/DL
RBC # BLD: 3.82 E12/L (ref 3.8–5.8)
RBC UA: ABNORMAL /HPF (ref 0–2)
SODIUM BLD-SCNC: 139 MMOL/L (ref 132–146)
SPECIFIC GRAVITY UA: 1.02 (ref 1–1.03)
TOTAL PROTEIN: 7.4 G/DL (ref 6.4–8.3)
UROBILINOGEN, URINE: 0.2 E.U./DL
WBC # BLD: 15.6 E9/L (ref 4.5–11.5)
WBC UA: ABNORMAL /HPF (ref 0–5)

## 2020-12-08 PROCEDURE — 71250 CT THORAX DX C-: CPT

## 2020-12-08 PROCEDURE — 80053 COMPREHEN METABOLIC PANEL: CPT

## 2020-12-08 PROCEDURE — 87088 URINE BACTERIA CULTURE: CPT

## 2020-12-08 PROCEDURE — 6360000002 HC RX W HCPCS: Performed by: INTERNAL MEDICINE

## 2020-12-08 PROCEDURE — 93880 EXTRACRANIAL BILAT STUDY: CPT | Performed by: RADIOLOGY

## 2020-12-08 PROCEDURE — 93010 ELECTROCARDIOGRAM REPORT: CPT | Performed by: INTERNAL MEDICINE

## 2020-12-08 PROCEDURE — 6370000000 HC RX 637 (ALT 250 FOR IP): Performed by: FAMILY MEDICINE

## 2020-12-08 PROCEDURE — 6370000000 HC RX 637 (ALT 250 FOR IP): Performed by: INTERNAL MEDICINE

## 2020-12-08 PROCEDURE — 87081 CULTURE SCREEN ONLY: CPT

## 2020-12-08 PROCEDURE — 94729 DIFFUSING CAPACITY: CPT

## 2020-12-08 PROCEDURE — 93970 EXTREMITY STUDY: CPT | Performed by: RADIOLOGY

## 2020-12-08 PROCEDURE — 99204 OFFICE O/P NEW MOD 45 MIN: CPT | Performed by: THORACIC SURGERY (CARDIOTHORACIC VASCULAR SURGERY)

## 2020-12-08 PROCEDURE — 87186 SC STD MICRODIL/AGAR DIL: CPT

## 2020-12-08 PROCEDURE — 85730 THROMBOPLASTIN TIME PARTIAL: CPT

## 2020-12-08 PROCEDURE — 2580000003 HC RX 258: Performed by: INTERNAL MEDICINE

## 2020-12-08 PROCEDURE — 83036 HEMOGLOBIN GLYCOSYLATED A1C: CPT

## 2020-12-08 PROCEDURE — 94375 RESPIRATORY FLOW VOLUME LOOP: CPT

## 2020-12-08 PROCEDURE — 2580000003 HC RX 258: Performed by: FAMILY MEDICINE

## 2020-12-08 PROCEDURE — 85027 COMPLETE CBC AUTOMATED: CPT

## 2020-12-08 PROCEDURE — 81001 URINALYSIS AUTO W/SCOPE: CPT

## 2020-12-08 PROCEDURE — 93005 ELECTROCARDIOGRAM TRACING: CPT | Performed by: PHYSICIAN ASSISTANT

## 2020-12-08 PROCEDURE — 2140000000 HC CCU INTERMEDIATE R&B

## 2020-12-08 PROCEDURE — 93880 EXTRACRANIAL BILAT STUDY: CPT

## 2020-12-08 PROCEDURE — 36415 COLL VENOUS BLD VENIPUNCTURE: CPT

## 2020-12-08 PROCEDURE — 93970 EXTREMITY STUDY: CPT

## 2020-12-08 PROCEDURE — 87147 CULTURE TYPE IMMUNOLOGIC: CPT

## 2020-12-08 PROCEDURE — 93922 UPR/L XTREMITY ART 2 LEVELS: CPT

## 2020-12-08 RX ORDER — SODIUM CHLORIDE 0.9 % (FLUSH) 0.9 %
10 SYRINGE (ML) INJECTION PRN
Status: DISCONTINUED | OUTPATIENT
Start: 2020-12-08 | End: 2020-12-08 | Stop reason: SDUPTHER

## 2020-12-08 RX ORDER — CHLORHEXIDINE GLUCONATE 0.12 MG/ML
15 RINSE ORAL ONCE
Status: COMPLETED | OUTPATIENT
Start: 2020-12-09 | End: 2020-12-09

## 2020-12-08 RX ORDER — SODIUM CHLORIDE 0.9 % (FLUSH) 0.9 %
10 SYRINGE (ML) INJECTION EVERY 12 HOURS SCHEDULED
Status: DISCONTINUED | OUTPATIENT
Start: 2020-12-08 | End: 2020-12-08 | Stop reason: SDUPTHER

## 2020-12-08 RX ORDER — METOPROLOL SUCCINATE 25 MG/1
25 TABLET, EXTENDED RELEASE ORAL DAILY
Status: COMPLETED | OUTPATIENT
Start: 2020-12-09 | End: 2020-12-09

## 2020-12-08 RX ORDER — CHLORHEXIDINE GLUCONATE 4 G/100ML
SOLUTION TOPICAL SEE ADMIN INSTRUCTIONS
Status: DISCONTINUED | OUTPATIENT
Start: 2020-12-09 | End: 2020-12-10 | Stop reason: HOSPADM

## 2020-12-08 RX ORDER — CHLORHEXIDINE GLUCONATE 4 G/100ML
SOLUTION TOPICAL SEE ADMIN INSTRUCTIONS
Status: DISCONTINUED | OUTPATIENT
Start: 2020-12-08 | End: 2020-12-08

## 2020-12-08 RX ADMIN — ATORVASTATIN CALCIUM 40 MG: 40 TABLET, FILM COATED ORAL at 21:41

## 2020-12-08 RX ADMIN — ENOXAPARIN SODIUM 40 MG: 40 INJECTION SUBCUTANEOUS at 09:07

## 2020-12-08 RX ADMIN — SODIUM CHLORIDE, PRESERVATIVE FREE 10 ML: 5 INJECTION INTRAVENOUS at 09:07

## 2020-12-08 RX ADMIN — WATER 1 G: 1 INJECTION INTRAMUSCULAR; INTRAVENOUS; SUBCUTANEOUS at 18:32

## 2020-12-08 RX ADMIN — METOPROLOL SUCCINATE 25 MG: 25 TABLET, EXTENDED RELEASE ORAL at 09:07

## 2020-12-08 RX ADMIN — SODIUM CHLORIDE, PRESERVATIVE FREE 10 ML: 5 INJECTION INTRAVENOUS at 21:41

## 2020-12-08 RX ADMIN — ASPIRIN 81 MG CHEWABLE TABLET 81 MG: 81 TABLET CHEWABLE at 09:07

## 2020-12-08 ASSESSMENT — PAIN SCALES - GENERAL
PAINLEVEL_OUTOF10: 0

## 2020-12-08 NOTE — PROGRESS NOTES
INPATIENT CARDIOLOGY FOLLOW-UP    Name: Luis Mendenhall    Age: 70 y.o. Date of Admission: 12/6/2020 10:08 PM    Date of Service: 12/8/2020    Chief Complaint: Follow-up for coronary atherosclerosis, non-ST elevation myocardial infarction, hypertension    Interim History: The patient presently remains stable from a cardiovascular standpoint with no recurrent ischemic symptomatology and angiographic and echocardiographic findings as noted. His radial catheterization site remains intact with surgical assessment pending. Review of Systems: The remainder of a complete multisystem review including consitutional, central nervous, respiratory, circulatory, gastrointestinal, genitourinary, endocrinologic, hematologic, musculoskeletal and psychiatric are negative.     Problem List:  Patient Active Problem List   Diagnosis    Chest pain    Anemia    Leukocytosis    HTN (hypertension)    History of rectal cancer       Allergies:  No Known Allergies    Current Medications:  Current Facility-Administered Medications   Medication Dose Route Frequency Provider Last Rate Last Dose    metoprolol succinate (TOPROL XL) extended release tablet 25 mg  25 mg Oral Daily Rashel East MD   25 mg at 12/08/20 8343    atorvastatin (LIPITOR) tablet 40 mg  40 mg Oral Nightly Rashel East MD   40 mg at 12/07/20 2156    enoxaparin (LOVENOX) injection 40 mg  40 mg Subcutaneous Daily Rashel East MD   40 mg at 12/08/20 6682    acetaminophen (TYLENOL) suppository 650 mg  650 mg Rectal Q6H PRN Bernarda Bilis, DO        Or    acetaminophen (TYLENOL) tablet 650 mg  650 mg Oral Q6H PRN Bernarda Nicoleis, DO        aspirin chewable tablet 324 mg  324 mg Oral Once Karishma Carlos MD        sodium chloride flush 0.9 % injection 10 mL  10 mL Intravenous 2 times per day Stoney Perales MD   10 mL at 12/08/20 0907    sodium chloride flush 0.9 % injection 10 mL  10 mL Intravenous PRN MD Luz Maria Storm polyethylene glycol (GLYCOLAX) packet 17 g  17 g Oral Daily PRN Rebekah Fabry, MD        promethazine (PHENERGAN) tablet 12.5 mg  12.5 mg Oral Q6H PRN Rebekah Fabry, MD        Or    ondansetron (ZOFRAN) injection 4 mg  4 mg Intravenous Q6H PRN Rebekah Fabry, MD        nitroGLYCERIN (NITROSTAT) SL tablet 0.4 mg  0.4 mg Sublingual Q5 Min PRN Rebekah Fabry, MD        aspirin chewable tablet 81 mg  81 mg Oral Daily Rebekah Fabry, MD   81 mg at 12/08/20 0972         Physical Exam:  BP (!) 106/58   Pulse 79   Temp 99.5 °F (37.5 °C) (Oral)   Resp 16   Ht 5' 10\" (1.778 m)   Wt 205 lb (93 kg)   SpO2 97%   BMI 29.41 kg/m²   Weight change: Wt Readings from Last 3 Encounters:   12/07/20 205 lb (93 kg)     The patient is awake, alert and in no discomfort or distress. No gross musculoskeletal deformity is present. No significant skin or nail changes are present. Gross examination of head, eyes, nose and throat are negative. Jugular venous pressure is normal and no carotid bruits are present. Normal respiratory effort is noted with no accessory muscle usage present. Lung fields are clear to ascultation. Cardiac examination is notable for a regular rate and rhythm with no palpable thrill. No gallop rhythm or cardiac murmur are identified. A benign abdominal examination is present with no masses or organomegaly. Intact pulses are present throughout all extremities and no peripheral edema is present. No focal neurologic deficits are present.     Intake/Output:    Intake/Output Summary (Last 24 hours) at 12/8/2020 0921  Last data filed at 12/8/2020 9560  Gross per 24 hour   Intake 540 ml   Output 1250 ml   Net -710 ml     I/O this shift:  In: 60 [P.O.:60]  Out: -     Laboratory Tests:  Lab Results   Component Value Date    CREATININE 0.8 12/07/2020    BUN 15 12/07/2020     12/07/2020    K 3.9 12/07/2020     12/07/2020    CO2 27 12/07/2020     Recent Labs     12/06/20  2223 12/07/20  0615 angiography demonstrating evidence of multivessel disease including that of a functionally significant left main trunk stenosis with present maintenance of medical management and surgical evaluation pending. Continued aggressive risk factor modification blood pressure and serum lipids remain essential to reducing risk of future atherosclerotic development. Note: This report was completed utilizing computer voice recognition software. Every effort has been made to ensure accuracy, however; inadvertent computerized transcription errors may be present. Sonido Holcomb.  [facility], [address] [facility]

## 2020-12-08 NOTE — PROGRESS NOTES
Hospitalist Progress Note      SYNOPSIS: Patient admitted on 2020 with past medical history of colorectal cancer status post colectomy in , chemotherapy and radiation, hypertension and former tobacco use. Patient was in his usual state of health until around 7 PM when he started having left-sided chest pain. He just got done eating ice cream when the pain started. Pain was described as achy, \"someone pushing on my chest\", 5 out of 10, nonradiating, lasted about 15 to 20 minutes, associated with lightheadedness and diaphoresis. Got some relief after taking aspirin. Underwent heart catheterization  showing multivessel disease. CTS consulted. SUBJECTIVE:  Stable overnight. No other overnight issues reported. Patient seen and examined  Records reviewed. Underwent heart catheterization  showing multivessel disease, CTS consulted  Denies any chest pain or shortness of breath      Temp (24hrs), Av.7 °F (37.1 °C), Min:98.2 °F (36.8 °C), Max:99.5 °F (37.5 °C)    DIET: DIET CARDIAC;  CODE: Full Code    Intake/Output Summary (Last 24 hours) at 2020 0909  Last data filed at 2020 5304  Gross per 24 hour   Intake 540 ml   Output 1250 ml   Net -710 ml       Review of Systems  All bolded are positive; please see HPI  General:  Fever, chills, diaphoresis, fatigue, malaise, night sweats, weight loss  Psychological:  Anxiety, disorientation, hallucinations. ENT:  Epistaxis, headaches, vertigo, visual changes. Cardiovascular:  Chest pain, irregular heartbeats, palpitations, paroxysmal nocturnal dyspnea. Respiratory:  Shortness of breath, coughing, sputum production, hemoptysis, wheezing, orthopnea.   Gastrointestinal:  Nausea, vomiting, diarrhea, heartburn, constipation, abdominal pain, hematemesis, hematochezia, melena, acholic stools  Genito-Urinary:  Dysuria, urgency, frequency, hematuria  Musculoskeletal:  Joint pain, joint stiffness, joint swelling, muscle pain  Neurology: Headache, focal neurological deficits, weakness, numbness, paresthesia  Derm:  Rashes, ulcers, excoriations, bruising  Extremities:  Decreased ROM, peripheral edema, mottling      OBJECTIVE:    BP (!) 106/58   Pulse 79   Temp 99.5 °F (37.5 °C) (Oral)   Resp 16   Ht 5' 10\" (1.778 m)   Wt 205 lb (93 kg)   SpO2 97%   BMI 29.41 kg/m²     General appearance:  awake, alert, and oriented to person, place, time, and purpose; appears stated age and cooperative; no apparent distress no labored breathing  HEENT:  Conjunctivae/corneas clear. Neck: Supple. No jugular venous distention. Respiratory: symmetrical; clear to auscultation bilaterally; no wheezes; no rhonchi; no rales  Cardiovascular: rhythm regular; rate controlled; no murmurs  Abdomen: Soft, nontender, nondistended  Extremities:  peripheral pulses present; no peripheral edema; no ulcers  Musculoskeletal: No clubbing, cyanosis, no bilateral lower extremity edema. Brisk capillary refill. Skin:  No rashes  on visible skin  Neurologic: awake, alert and following commands     ASSESSMENT and PLAN:  · NSTEMI- Aspirin, beta-blocker, statin. Echocardiogram 12/6 reviewed. Underwent cardiac catheterization 12/7 showing multivessel disease. CTS consulted  · Leukocytosis, follow CBC. · Hypertension, resume home medications when available. · Anemia, monitor hemoglobin  · History of colorectal cancer status post resection, chemotherapy and radiation.   · COPD          DISPOSITION: Continue current plan of care    Medications:  REVIEWED DAILY    Infusion Medications   Scheduled Medications    metoprolol succinate  25 mg Oral Daily    atorvastatin  40 mg Oral Nightly    enoxaparin  40 mg Subcutaneous Daily    aspirin  324 mg Oral Once    sodium chloride flush  10 mL Intravenous 2 times per day    aspirin  81 mg Oral Daily     PRN Meds: acetaminophen **OR** acetaminophen, sodium chloride flush, polyethylene glycol, promethazine **OR** ondansetron, nitroGLYCERIN    Labs:     Recent Labs     12/06/20  2223 12/07/20 0615   WBC 13.8* 9.8   HGB 11.6* 11.1*   HCT 35.0* 34.2*    376       Recent Labs     12/06/20 2223 12/07/20 0615    138   K 3.9 3.9    106   CO2 26 27   BUN 15 15   CREATININE 0.9 0.8   CALCIUM 9.4 9.3       Recent Labs     12/06/20 2223   PROT 7.2   ALKPHOS 193*   ALT 35   AST 24   BILITOT <0.2       Recent Labs     12/06/20  2223   INR 1.1       Recent Labs     12/06/20  2223 12/07/20  0615 12/07/20  1104   CKTOTAL  --  86  --    TROPONINI <0.01 0.20* 0.18*       Chronic labs:    Lab Results   Component Value Date    CHOL 165 12/07/2020    TRIG 85 12/07/2020    HDL 44 12/07/2020    LDLCALC 104 (H) 12/07/2020    INR 1.1 12/06/2020       Radiology: REVIEWED DAILY    +++++++++++++++++++++++++++++++++++++++++++++++++  Rajesh [facility] Physician - Hospitalist  [address], [place]  +++++++++++++++++++++++++++++++++++++++++++++++++  NOTE: This report was transcribed using voice recognition software. Every effort was made to ensure accuracy; however, inadvertent computerized transcription errors may be present.

## 2020-12-08 NOTE — CONSULTS
CTS Consult    Patient name: Temi Almendarez    Reason for consult:  MVCAD     Referring Physician:  Dr. Ayaka Castillo     Primary Care Physician: No primary care provider on file. Date of service: 12/8/2020    Chief Complaint:  Chest pain, NSTEMI     HPI:  71 yo male with hx of HTN, rectal CA in 2008, former smoker, who presented to the ED with chest \"pressure\". Initial troponin was negative, however repeat was 0.2. 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 Of note, he did have a left main lesion which was significant by FFR. He is currently not complaining of chest discomfort.       Allergies: No Known Allergies    Home medications:    Current Facility-Administered Medications   Medication Dose Route Frequency Provider Last Rate Last Dose    ceFAZolin (ANCEF) 2 g in sterile water 20 mL IV syringe  2 g Intravenous [facility]) 2 % ointment   Nasal BID Eros Carrillo PA        [START ON 12/9/2020] chlorhexidine (PERIDEX) 0.12 % solution 15 mL  15 mL Mouth/Throat Once Posen Lorena [place]        chlorhexidine (HIBICLENS) 4 % liquid   Topical See Admin Instructions Posen Lorena PA        [START ON 12/9/2020] metoprolol succinate (TOPROL XL) extended release tablet 25 mg  25 mg Oral Daily Posen Lorena [place]        [START ON 12/9/2020] enoxaparin (LOVENOX) injection 40 mg  40 mg Subcutaneous Daily [facility] [place]        atorvastatin (LIPITOR) tablet 40 mg  40 mg Oral Nightly Reece Santiago MD   40 mg at 12/07/20 2156    acetaminophen (TYLENOL) suppository 650 mg  650 mg Rectal Q6H PRN Darby Euegne, DO        Or    acetaminophen (TYLENOL) tablet 650 mg  650 mg Oral Q6H PRN Darby Knight, DO        aspirin chewable tablet 324 mg  324 mg Oral Once Darin Gudino MD        sodium chloride flush 0.9 % injection 10 mL  10 mL Intravenous 2 times per day Kolby Ivey MD   10 mL at 12/08/20 0907    sodium chloride flush 0.9 % injection 10 mL  10 mL Intravenous PRN Citlali Cummings MD        polyethylene glycol (GLYCOLAX) packet 17 g  17 g Oral Daily PRN Citlali Cummings MD        promethazine (PHENERGAN) tablet 12.5 mg  12.5 mg Oral Q6H PRN Citlali Cummings MD        Or    ondansetron (ZOFRAN) injection 4 mg  4 mg Intravenous Q6H PRN Citlali Cummings MD        nitroGLYCERIN (NITROSTAT) SL tablet 0.4 mg  0.4 mg Sublingual Q5 Min PRN Citlali Cummings MD        aspirin chewable tablet 81 mg  81 mg Oral Daily Citlali Cummings MD   81 mg at 12/08/20 1800       Past Medical History:  History reviewed. No pertinent past medical history. Past Surgical History:  History reviewed. No pertinent surgical history.     Social History:  Social History     Socioeconomic History    Marital status:      Spouse name: Not on file    Number of children: Not on file    Years of education: Not on file    Highest education level: Not on file   Occupational History    Not on file   Social Needs    Financial resource strain: Not on file    Food insecurity     Worry: Not on file     Inability: Not on file    Transportation needs     Medical: Not on file     Non-medical: Not on file   Tobacco Use    Smoking status: Former Smoker    Smokeless tobacco: Never Used   Substance and Sexual Activity    Alcohol use: Not Currently    Drug use: Never    Sexual activity: Not Currently   Lifestyle    Physical activity     Days per week: Not on file     Minutes per session: Not on file    Stress: Not on file   Relationships    Social connections     Talks on phone: Not on file     Gets together: Not on file     Attends Holiness service: Not on file     Active member of club or organization: Not on file     Attends meetings of clubs or organizations: Not on file     Relationship status: Not on file    Intimate partner violence     Fear of current or ex partner: Not on file     Emotionally abused: Not on file Physically abused: Not on file     Forced sexual activity: Not on file   Other Topics Concern    Not on file   Social History Narrative    Not on file       Family History:  History reviewed. No pertinent family history. Review of Systems:  Constitutional: Denies fevers, chills, or weight loss. HEENT: Denies visual changes or hearing loss. Heart: As per HPI. Lungs: Denies shortness of breath, cough, or wheezing. Gastrointestinal: Denies nausea, vomiting, constipation, or diarrhea. Genitourinary: dysuria or hematuria. Psychiatric: Patient denies anxiety or depression. Neurologic: Patient denies weakness of the extremities, dizziness, or headaches. All other ROS checked and found to be negative. Labs:  Recent Labs     12/06/20 2223 12/07/20 0615   WBC 13.8* 9.8   HGB 11.6* 11.1*   HCT 35.0* 34.2*    376      Recent Labs     12/06/20 2223 12/07/20 0615   BUN 15 15   CREATININE 0.9 0.8       Objective:  Vitals BP (!) 106/58   Pulse 79   Temp 99.5 °F (37.5 °C) (Oral)   Resp 16   Ht 5' 10\" (1.778 m)   Wt 205 lb (93 kg)   SpO2 97%   BMI 29.41 kg/m²   General Appearance: Pleasant 70y.o. year old male who appears stated age. Communicates well, no acute distress. HEENT: Head is normocephalic, atraumatic. EOMs intact, PERRL. Trachea midline. Lungs: Normal respiratory rate and normal effort. He is not in respiratory distress. Breath sounds clear to auscultation. No wheezes. Heart: Normal rate. Regular rhythm. S1 normal and S2 normal.  Chest: Symmetric chest wall expansion. Extremities: Normal range of motion. Neurological: Patient is alert and oriented to person, place and time. Patient has normal reflexes. Skin: Warm and dry. Abdomen: Abdomen is soft and non-distended. Bowel sounds are normal. There is no abdominal tenderness tenderness. There is no guarding. There is no mass. Pulses: Distal pulses are intact.   Skin: Warm and dry without lesions. [facility]  Angiographic Results/findings:  Left Main: Tortuous. Distal 50%. FFR 0.44  LAD: Minimal diffuse luminal irregularities. D1: Ostial 80%  D2: No angiographically significant stenosis. 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 RCA: Dominant. Proximal eccentric 75%. Proximal to mid diffuse 70%. Mid eccentric 50%. Proximal diffuse 30%. PDA: Proximal diffuse 30%. PLB: Proximal to mid 80%. LV: Normal LV size and systolic function. No wall motion abnormalities. 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 Surgery was recommended. All risks, benefits, alternatives and potential complications explained thoroughly including, but not limited to, bleeding, infection, lung injury, kidney injury, stroke, heart attack, prolonged ventilation, wound complication, need for re-operation, and death. Will plan for CABG this admission (12/10).        Electronically signed by Keesha Sam DO on 12/8/2020 at 10:41 AM

## 2020-12-08 NOTE — CONSULTS
Met with patient and discussed our outpatient Phase II Cardiac Rehabilitation program. Reviewed the benefits of cardiac rehabilitation based on their diagnosis and personal risk factors. Patient demonstrates moderate interest in Cardiac Rehabilitation at this time. The patient may call [facility] at [phone] for additional information or questions. Contact information for [facility] and other choices close to the patient's residence have been provided in the discharge Instructions so that the patient may call and schedule an appointment when cleared by their physician. Cardiac Rehabilitation brochure provided to patient/family.

## 2020-12-09 ENCOUNTER — ANESTHESIA EVENT (OUTPATIENT)
Dept: OPERATING ROOM | Age: 75
DRG: 234 | End: 2020-12-09
Payer: MEDICARE

## 2020-12-09 LAB
ABO/RH: NORMAL
ANION GAP SERPL CALCULATED.3IONS-SCNC: 9 MMOL/L (ref 7–16)
ANTIBODY SCREEN: NORMAL
APTT: 27.3 SEC (ref 24.5–35.1)
BLOOD BANK DISPENSE STATUS: NORMAL
BLOOD BANK DISPENSE STATUS: NORMAL
BLOOD BANK PRODUCT CODE: NORMAL
BLOOD BANK PRODUCT CODE: NORMAL
BPU ID: NORMAL
BPU ID: NORMAL
BUN BLDV-MCNC: 20 MG/DL (ref 8–23)
CALCIUM SERPL-MCNC: 9.2 MG/DL (ref 8.6–10.2)
CHLORIDE BLD-SCNC: 106 MMOL/L (ref 98–107)
CO2: 24 MMOL/L (ref 22–29)
CREAT SERPL-MCNC: 1 MG/DL (ref 0.7–1.2)
DESCRIPTION BLOOD BANK: NORMAL
DESCRIPTION BLOOD BANK: NORMAL
GFR AFRICAN AMERICAN: >60
GFR NON-AFRICAN AMERICAN: >60 ML/MIN/1.73
GLUCOSE BLD-MCNC: 94 MG/DL (ref 74–99)
HCT VFR BLD CALC: 35.5 % (ref 37–54)
HEMOGLOBIN: 11.5 G/DL (ref 12.5–16.5)
INR BLD: 1.4
MCH RBC QN AUTO: 30.6 PG (ref 26–35)
MCHC RBC AUTO-ENTMCNC: 32.4 % (ref 32–34.5)
MCV RBC AUTO: 94.4 FL (ref 80–99.9)
PDW BLD-RTO: 12.8 FL (ref 11.5–15)
PLATELET # BLD: 353 E9/L (ref 130–450)
PMV BLD AUTO: 10.4 FL (ref 7–12)
POTASSIUM SERPL-SCNC: 4 MMOL/L (ref 3.5–5)
PROTHROMBIN TIME: 16.1 SEC (ref 9.3–12.4)
RBC # BLD: 3.76 E12/L (ref 3.8–5.8)
SODIUM BLD-SCNC: 139 MMOL/L (ref 132–146)
WBC # BLD: 13 E9/L (ref 4.5–11.5)

## 2020-12-09 PROCEDURE — 6360000002 HC RX W HCPCS: Performed by: INTERNAL MEDICINE

## 2020-12-09 PROCEDURE — 85610 PROTHROMBIN TIME: CPT

## 2020-12-09 PROCEDURE — 6370000000 HC RX 637 (ALT 250 FOR IP): Performed by: PHYSICIAN ASSISTANT

## 2020-12-09 PROCEDURE — 2580000003 HC RX 258: Performed by: FAMILY MEDICINE

## 2020-12-09 PROCEDURE — 80048 BASIC METABOLIC PNL TOTAL CA: CPT

## 2020-12-09 PROCEDURE — 86923 COMPATIBILITY TEST ELECTRIC: CPT

## 2020-12-09 PROCEDURE — 36415 COLL VENOUS BLD VENIPUNCTURE: CPT

## 2020-12-09 PROCEDURE — 86900 BLOOD TYPING SEROLOGIC ABO: CPT

## 2020-12-09 PROCEDURE — 2580000003 HC RX 258: Performed by: INTERNAL MEDICINE

## 2020-12-09 PROCEDURE — 86901 BLOOD TYPING SEROLOGIC RH(D): CPT

## 2020-12-09 PROCEDURE — 85027 COMPLETE CBC AUTOMATED: CPT

## 2020-12-09 PROCEDURE — 6370000000 HC RX 637 (ALT 250 FOR IP): Performed by: INTERNAL MEDICINE

## 2020-12-09 PROCEDURE — 85730 THROMBOPLASTIN TIME PARTIAL: CPT

## 2020-12-09 PROCEDURE — 2140000000 HC CCU INTERMEDIATE R&B

## 2020-12-09 PROCEDURE — 6370000000 HC RX 637 (ALT 250 FOR IP): Performed by: FAMILY MEDICINE

## 2020-12-09 PROCEDURE — 86850 RBC ANTIBODY SCREEN: CPT

## 2020-12-09 RX ADMIN — SODIUM CHLORIDE, PRESERVATIVE FREE 10 ML: 5 INJECTION INTRAVENOUS at 10:15

## 2020-12-09 RX ADMIN — METOPROLOL SUCCINATE 25 MG: 25 TABLET, EXTENDED RELEASE ORAL at 10:15

## 2020-12-09 RX ADMIN — MUPIROCIN: 20 OINTMENT TOPICAL at 21:09

## 2020-12-09 RX ADMIN — SODIUM CHLORIDE, PRESERVATIVE FREE 10 ML: 5 INJECTION INTRAVENOUS at 21:10

## 2020-12-09 RX ADMIN — WATER 1 G: 1 INJECTION INTRAMUSCULAR; INTRAVENOUS; SUBCUTANEOUS at 17:37

## 2020-12-09 RX ADMIN — CHLORHEXIDINE GLUCONATE 15 ML: 1.2 RINSE ORAL at 21:09

## 2020-12-09 RX ADMIN — ASPIRIN 81 MG CHEWABLE TABLET 81 MG: 81 TABLET CHEWABLE at 10:15

## 2020-12-09 RX ADMIN — CHLORHEXIDINE GLUCONATE: 213 SOLUTION TOPICAL at 22:15

## 2020-12-09 RX ADMIN — MUPIROCIN: 20 OINTMENT TOPICAL at 10:14

## 2020-12-09 RX ADMIN — ATORVASTATIN CALCIUM 40 MG: 40 TABLET, FILM COATED ORAL at 21:09

## 2020-12-09 ASSESSMENT — PAIN SCALES - GENERAL
PAINLEVEL_OUTOF10: 0

## 2020-12-09 NOTE — PROGRESS NOTES
Hospitalist Progress Note      SYNOPSIS: Patient admitted on 2020 with past medical history of colorectal cancer status post colectomy in , chemotherapy and radiation, hypertension and former tobacco use. Patient was in his usual state of health until around 7 PM when he started having left-sided chest pain. He just got done eating ice cream when the pain started. Pain was described as achy, \"someone pushing on my chest\", 5 out of 10, nonradiating, lasted about 15 to 20 minutes, associated with lightheadedness and diaphoresis. Got some relief after taking aspirin. Underwent heart catheterization  showing multivessel disease. CTS consulted. SUBJECTIVE:  Stable overnight. No other overnight issues reported. Patient seen and examined  Records reviewed. Underwent heart catheterization  showing multivessel disease, CTS consulted, plan is for CABG tomorrow  Denies any chest pain or shortness of breath      Temp (24hrs), Av.7 °F (37.1 °C), Min:98.3 °F (36.8 °C), Max:99.2 °F (37.3 °C)    DIET: DIET CARDIAC; Diet NPO, After Midnight Exceptions are: Sips with Meds  CODE: Full Code    Intake/Output Summary (Last 24 hours) at 2020 1222  Last data filed at 2020 0600  Gross per 24 hour   Intake 540 ml   Output 620 ml   Net -80 ml       Review of Systems  All bolded are positive; please see HPI  General:  Fever, chills, diaphoresis, fatigue, malaise, night sweats, weight loss  Psychological:  Anxiety, disorientation, hallucinations. ENT:  Epistaxis, headaches, vertigo, visual changes. Cardiovascular:  Chest pain, irregular heartbeats, palpitations, paroxysmal nocturnal dyspnea. Respiratory:  Shortness of breath, coughing, sputum production, hemoptysis, wheezing, orthopnea.   Gastrointestinal:  Nausea, vomiting, diarrhea, heartburn, constipation, abdominal pain, hematemesis, hematochezia, melena, acholic stools  Genito-Urinary:  Dysuria, urgency, frequency, Instructions    chlorhexidine   Topical See Admin Instructions    cefTRIAXone (ROCEPHIN) IV  1 g Intravenous Q24H    atorvastatin  40 mg Oral Nightly    aspirin  324 mg Oral Once    sodium chloride flush  10 mL Intravenous 2 times per day    aspirin  81 mg Oral Daily     PRN Meds: acetaminophen **OR** acetaminophen, sodium chloride flush, polyethylene glycol, promethazine **OR** ondansetron, nitroGLYCERIN    Labs:     Recent Labs     12/07/20  0615 12/08/20  1013 12/09/20  0557   WBC 9.8 15.6* 13.0*   HGB 11.1* 11.7* 11.5*   HCT 34.2* 35.8* 35.5*    384 353       Recent Labs     12/07/20  0615 12/08/20  1013 12/09/20  0557    139 139   K 3.9 4.0 4.0    105 106   CO2 27 20* 24   BUN 15 19 20   CREATININE 0.8 1.1 1.0   CALCIUM 9.3 9.5 9.2       Recent Labs     12/06/20  2223 12/08/20  1013   PROT 7.2 7.4   ALKPHOS 193* 158*   ALT 35 27   AST 24 20   BILITOT <0.2 0.7       Recent Labs     12/06/20  2223 12/09/20  1017   INR 1.1 1.4       Recent Labs     12/06/20  2223 12/07/20  0615 12/07/20  1104   CKTOTAL  --  86  --    TROPONINI <0.01 0.20* 0.18*       Chronic labs:    Lab Results   Component Value Date    CHOL 165 12/07/2020    TRIG 85 12/07/2020    HDL 44 12/07/2020    LDLCALC 104 (H) 12/07/2020    INR 1.4 12/09/2020    LABA1C 5.6 12/08/2020       Radiology: REVIEWED DAILY    +++++++++++++++++++++++++++++++++++++++++++++++++  [address], [place]  +++++++++++++++++++++++++++++++++++++++++++++++++  NOTE: This report was transcribed using voice recognition software. Every effort was made to ensure accuracy; however, inadvertent computerized transcription errors may be present.

## 2020-12-09 NOTE — CARE COORDINATION
Scheduled for open heart on Thursday. Choice for [facility] is [facility] if needed at discharge. Discharge Plan is to return home. Sw/CRISSY to follow for discharge needs.      Javier Amaya, [email or web address].  [phone]

## 2020-12-09 NOTE — PROGRESS NOTES
Oral Nightly Marisela Rivers MD   40 mg at 12/08/20 2141    acetaminophen (TYLENOL) suppository 650 mg  650 mg Rectal Q6H PRN Lauren Treviño DO        Or    acetaminophen (TYLENOL) tablet 650 mg  650 mg Oral Q6H PRN Lauren Treviño, DO        aspirin chewable tablet 324 mg  324 mg Oral Once Kriss Colbert MD        sodium chloride flush 0.9 % injection 10 mL  10 mL Intravenous 2 times per day Mateo Nobles MD   10 mL at 12/08/20 2141    sodium chloride flush 0.9 % injection 10 mL  10 mL Intravenous PRN Mateo Nobles MD        polyethylene glycol (GLYCOLAX) packet 17 g  17 g Oral Daily PRN Mateo Nobles MD        promethazine (PHENERGAN) tablet 12.5 mg  12.5 mg Oral Q6H PRN Mateo Nobles MD        Or    ondansetron (ZOFRAN) injection 4 mg  4 mg Intravenous Q6H PRN Mateo Nobles MD        nitroGLYCERIN (NITROSTAT) SL tablet 0.4 mg  0.4 mg Sublingual Q5 Min PRN Mateo Nobles MD        aspirin chewable tablet 81 mg  81 mg Oral Daily Mateo Nobles MD   81 mg at 12/08/20 8782         Physical Exam:  /62   Pulse 80   Temp 98.6 °F (37 °C) (Temporal)   Resp 16   Ht 5' 10\" (1.778 m)   Wt 196 lb 9.6 oz (89.2 kg)   SpO2 94%   BMI 28.21 kg/m²   Weight change: Wt Readings from Last 3 Encounters:   12/08/20 196 lb 9.6 oz (89.2 kg)     The patient is awake, alert and in no discomfort or distress. No gross musculoskeletal deformity is present. No significant skin or nail changes are present. Gross examination of head, eyes, nose and throat are negative. Jugular venous pressure is normal and no carotid bruits are present. Normal respiratory effort is noted with no accessory muscle usage present. Lung fields are clear to ascultation. Cardiac examination is notable for a regular rate and rhythm with no palpable thrill. No gallop rhythm or cardiac murmur are identified. A benign abdominal examination is present with no masses or organomegaly.  Intact pulses are present throughout all extremities and no peripheral edema is present. No focal neurologic deficits are present. Intake/Output:    Intake/Output Summary (Last 24 hours) at 12/9/2020 0907  Last data filed at 12/9/2020 0600  Gross per 24 hour   Intake 540 ml   Output 720 ml   Net -180 ml     No intake/output data recorded. Laboratory Tests:  Lab Results   Component Value Date    CREATININE 1.0 12/09/2020    BUN 20 12/09/2020     12/09/2020    K 4.0 12/09/2020     12/09/2020    CO2 24 12/09/2020     Recent Labs     12/06/20  2223 12/07/20  0615   CKTOTAL  --  86   CKMB 2.0 10.0*     No results found for: BNP  Lab Results   Component Value Date    WBC 13.0 12/09/2020    RBC 3.76 12/09/2020    HGB 11.5 12/09/2020    HCT 35.5 12/09/2020    MCV 94.4 12/09/2020    MCH 30.6 12/09/2020    MCHC 32.4 12/09/2020    RDW 12.8 12/09/2020     12/09/2020    MPV 10.4 12/09/2020     Recent Labs     12/06/20  2223 12/08/20  1013   ALKPHOS 193* 158*   ALT 35 27   AST 24 20   PROT 7.2 7.4   BILITOT <0.2 0.7   LABALBU 3.5 3.7     No results found for: MG  Lab Results   Component Value Date    PROTIME 12.1 12/06/2020    INR 1.1 12/06/2020     No results found for: TSH  No components found for: CHLPL  Lab Results   Component Value Date    TRIG 85 12/07/2020     Lab Results   Component Value Date    HDL 44 12/07/2020     Lab Results   Component Value Date    LDLCALC 104 (H) 12/07/2020       Cardiac Tests:  Telemetry findings reviewed: sinus rhythm, no new tachy/bradyarrhythmias overnight      ASSESSMENT / PLAN: On a clinical basis, the patient presently remains stable from a cardiovascular standpoint with no recurrent ischemic symptomatology and surgical revascularization planned for tomorrow. His low-grade fever has presently resolved with urinalysis suggesting the possibility of a urinary tract infection and improving leukocytosis is present with antibiotic therapy initiated.   Continued medical stabilization will be maintained pending revascularization with ongoing needs of aggressive risk factor modification of blood pressure and serum lipids necessary to reduce risk of future atherosclerotic development. Note: This report was completed utilizing computer voice recognition software. Every effort has been made to ensure accuracy, however; inadvertent computerized transcription errors may be present. Justin Lopez.  Vin Thorpe, [facility] Card[facility]

## 2020-12-09 NOTE — PROGRESS NOTES
CC: CP    Brief HPI:  Patient seen with Dr. Mackenzie Dale. Awake, alert. No complaints. History reviewed. No pertinent past medical history. History reviewed. No pertinent surgical history. Social History     Socioeconomic History    Marital status:      Spouse name: Not on file    Number of children: Not on file    Years of education: Not on file    Highest education level: Not on file   Occupational History    Not on file   Social Needs    Financial resource strain: Not on file    Food insecurity     Worry: Not on file     Inability: Not on file    Transportation needs     Medical: Not on file     Non-medical: Not on file   Tobacco Use    Smoking status: Former Smoker    Smokeless tobacco: Never Used   Substance and Sexual Activity    Alcohol use: Not Currently    Drug use: Never    Sexual activity: Not Currently   Lifestyle    Physical activity     Days per week: Not on file     Minutes per session: Not on file    Stress: Not on file   Relationships    Social connections     Talks on phone: Not on file     Gets together: Not on file     Attends Sikhism service: Not on file     Active member of club or organization: Not on file     Attends meetings of clubs or organizations: Not on file     Relationship status: Not on file    Intimate partner violence     Fear of current or ex partner: Not on file     Emotionally abused: Not on file     Physically abused: Not on file     Forced sexual activity: Not on file   Other Topics Concern    Not on file   Social History Narrative    Not on file     History reviewed. No pertinent family history.     Vitals:    12/08/20 1558 12/08/20 2135 12/09/20 0120 12/09/20 0600   BP: (!) 106/56 126/60 126/67 116/62   Pulse: 66 76 81 80   Resp: 16 16 16 16   Temp: 99 °F (37.2 °C) 99.2 °F (37.3 °C) 98.6 °F (37 °C) 98.6 °F (37 °C)   TempSrc: Temporal Temporal Temporal Temporal   SpO2: 99% 97% 94% 94%   Weight:       Height:               Intake/Output Summary (Last 24 hours) at 12/9/2020 0931  Last data filed at 12/9/2020 0600  Gross per 24 hour   Intake 540 ml   Output 720 ml   Net -180 ml         Recent Labs     12/07/20  0615 12/08/20  1013 12/09/20  0557   WBC 9.8 15.6* 13.0*   HGB 11.1* 11.7* 11.5*   HCT 34.2* 35.8* 35.5*    384 353      Recent Labs     12/07/20  0615 12/08/20  1013 12/09/20  0557   BUN 15 19 20   CREATININE 0.8 1.1 1.0         ROS:   Negative for CP, palpitations, SOB at rest, dizziness/lightheadedness. Physical Exam   Constitutional: Oriented to person, place, and time. Appears well-developed. No distress. Cardiovascular: Normal rate, regular rhythm and normal heart sounds. Pulmonary/Chest: Effort normal. No respiratory distress. Abdominal: Soft. Bowel sounds are normal.   Musculoskeletal: Normal range of motion. Neurological: alert and oriented to person, place, and time. Skin: Skin is warm and dry. Psychiatric: normal mood and affect. A/P:  1) MVCAD, NSTEMI  --pre-operative testing complete  --pre operative orders in progress  --plan for CABG tomorrow--all R/B/As previously discussed with patient per Dr. Ulysees Kussmaul and he continues to agree to surgery  --CONSENT HAS BEEN SIGNED BY DR. Keegan Clinton AND IS IN THE CHART  --continue supportive care        Note: 25 minutes was spent providing face-to-face patient care, including:  and coordinating care, reviewing the chart, labs, and diagnostics, as well as medical decision making. Greater than 50% of this time was spent instructing and counseling the patient face to face regarding findings and recommendations.

## 2020-12-10 ENCOUNTER — ANESTHESIA (OUTPATIENT)
Dept: OPERATING ROOM | Age: 75
DRG: 234 | End: 2020-12-10
Payer: MEDICARE

## 2020-12-10 ENCOUNTER — APPOINTMENT (OUTPATIENT)
Dept: GENERAL RADIOLOGY | Age: 75
DRG: 234 | End: 2020-12-10
Payer: MEDICARE

## 2020-12-10 VITALS — RESPIRATION RATE: 10 BRPM | OXYGEN SATURATION: 100 %

## 2020-12-10 LAB
AADO2: 113.5 MMHG
AADO2: 88 MMHG
ACTIVATED CLOTTING TIME: 107 SECONDS (ref 99–130)
ACTIVATED CLOTTING TIME: 117 SECONDS (ref 99–130)
ACTIVATED CLOTTING TIME: 410 SECONDS (ref 99–130)
ACTIVATED CLOTTING TIME: 421 SECONDS (ref 99–130)
ACTIVATED CLOTTING TIME: 482 SECONDS (ref 99–130)
ACTIVATED CLOTTING TIME: 536 SECONDS (ref 99–130)
ANION GAP SERPL CALCULATED.3IONS-SCNC: 10 MMOL/L (ref 7–16)
ANION GAP SERPL CALCULATED.3IONS-SCNC: 13 MMOL/L (ref 7–16)
APTT: 28.1 SEC (ref 24.5–35.1)
B.E.: -1.3 MMOL/L (ref -3–0)
B.E.: -1.6 MMOL/L (ref -3–0)
B.E.: -2.1 MMOL/L (ref -3–0)
B.E.: -4.1 MMOL/L (ref -3–3)
B.E.: -6.2 MMOL/L (ref -3–3)
B.E.: -6.6 MMOL/L (ref -3–3)
BUN BLDV-MCNC: 23 MG/DL (ref 8–23)
BUN BLDV-MCNC: 23 MG/DL (ref 8–23)
CALCIUM SERPL-MCNC: 7.9 MG/DL (ref 8.6–10.2)
CALCIUM SERPL-MCNC: 9.4 MG/DL (ref 8.6–10.2)
CARDIOPULMONARY BYPASS: YES
CHLORIDE BLD-SCNC: 103 MMOL/L (ref 98–107)
CHLORIDE BLD-SCNC: 107 MMOL/L (ref 98–107)
CO2: 21 MMOL/L (ref 22–29)
CO2: 23 MMOL/L (ref 22–29)
COHB: 0.3 % (ref 0–1.5)
CREAT SERPL-MCNC: 0.9 MG/DL (ref 0.7–1.2)
CREAT SERPL-MCNC: 0.9 MG/DL (ref 0.7–1.2)
CRITICAL: ABNORMAL
DATE ANALYZED: ABNORMAL
DATE OF COLLECTION: ABNORMAL
DEVICE: ABNORMAL
FIO2: 40 %
FIO2: 50 %
GFR AFRICAN AMERICAN: >60
GFR AFRICAN AMERICAN: >60
GFR NON-AFRICAN AMERICAN: >60 ML/MIN/1.73
GFR NON-AFRICAN AMERICAN: >60 ML/MIN/1.73
GLUCOSE BLD-MCNC: 165 MG/DL (ref 74–99)
GLUCOSE BLD-MCNC: 91 MG/DL (ref 74–99)
HCO3 ARTERIAL: 22 MMOL/L (ref 22–26)
HCO3 ARTERIAL: 22.2 MMOL/L (ref 22–26)
HCO3 ARTERIAL: 23.2 MMOL/L (ref 22–26)
HCO3: 19 MMOL/L (ref 22–26)
HCO3: 19.2 MMOL/L (ref 22–26)
HCO3: 21.4 MMOL/L (ref 22–26)
HCT (EST): 23 % (ref 37–54)
HCT (EST): 25 % (ref 37–54)
HCT (EST): 30 % (ref 37–54)
HCT VFR BLD CALC: 29 % (ref 37–54)
HCT VFR BLD CALC: 36.1 % (ref 37–54)
HEMOGLOBIN: 11.7 G/DL (ref 12.5–16.5)
HEMOGLOBIN: 9.3 G/DL (ref 12.5–16.5)
HGB, (EST): 10.1 G/DL (ref 12.5–15.5)
HGB, (EST): 7.8 G/DL (ref 12.5–15.5)
HGB, (EST): 8.5 G/DL (ref 12.5–15.5)
HHB: 1.5 % (ref 0–5)
HHB: 2.9 % (ref 0–5)
HHB: 3.6 % (ref 0–5)
INR BLD: 1.5
LAB: ABNORMAL
MAGNESIUM: 2.9 MG/DL (ref 1.6–2.6)
MCH RBC QN AUTO: 30.5 PG (ref 26–35)
MCH RBC QN AUTO: 30.6 PG (ref 26–35)
MCHC RBC AUTO-ENTMCNC: 32.1 % (ref 32–34.5)
MCHC RBC AUTO-ENTMCNC: 32.4 % (ref 32–34.5)
MCV RBC AUTO: 94.5 FL (ref 80–99.9)
MCV RBC AUTO: 95.1 FL (ref 80–99.9)
METER GLUCOSE: 172 MG/DL (ref 74–99)
METER GLUCOSE: 173 MG/DL (ref 74–99)
METER GLUCOSE: 177 MG/DL (ref 74–99)
METER GLUCOSE: 177 MG/DL (ref 74–99)
METER GLUCOSE: 192 MG/DL (ref 74–99)
METHB: 0.2 % (ref 0–1.5)
METHB: 0.3 % (ref 0–1.5)
METHB: 0.4 % (ref 0–1.5)
MODE: ABNORMAL
MODE: ABNORMAL
MODE: AC
MRSA CULTURE ONLY: NORMAL
O2 SATURATION: 100 % (ref 92–98.5)
O2 SATURATION: 100 % (ref 92–98.5)
O2 SATURATION: 96.4 % (ref 92–98.5)
O2 SATURATION: 97.1 % (ref 92–98.5)
O2 SATURATION: 98.5 % (ref 92–98.5)
O2 SATURATION: 99.8 % (ref 92–98.5)
O2HB: 95.7 % (ref 94–97)
O2HB: 96.5 % (ref 94–97)
O2HB: 98 % (ref 94–97)
OPERATOR ID: 187
OPERATOR ID: ABNORMAL
PATIENT TEMP: 37 C
PCO2 ARTERIAL: 29.9 MMHG (ref 35–45)
PCO2 ARTERIAL: 35 MMHG (ref 35–45)
PCO2 ARTERIAL: 38.5 MMHG (ref 35–45)
PCO2: 37.3 MMHG (ref 35–45)
PCO2: 38.4 MMHG (ref 35–45)
PCO2: 41 MMHG (ref 35–45)
PDW BLD-RTO: 12.8 FL (ref 11.5–15)
PDW BLD-RTO: 12.8 FL (ref 11.5–15)
PEEP/CPAP: 5 CMH2O
PEEP/CPAP: 5 CMH2O
PFO2: 2.97 MMHG/%
PFO2: 4.26 MMHG/%
PH BLOOD GAS: 7.31 (ref 7.35–7.45)
PH BLOOD GAS: 7.33 (ref 7.35–7.45)
PH BLOOD GAS: 7.34 (ref 7.35–7.45)
PH BLOOD GAS: 7.39 (ref 7.35–7.45)
PH BLOOD GAS: 7.41 (ref 7.35–7.45)
PH BLOOD GAS: 7.47 (ref 7.35–7.45)
PLATELET # BLD: 236 E9/L (ref 130–450)
PLATELET # BLD: 354 E9/L (ref 130–450)
PMV BLD AUTO: 10.4 FL (ref 7–12)
PMV BLD AUTO: 10.5 FL (ref 7–12)
PO2 ARTERIAL: 228.8 MMHG (ref 60–80)
PO2 ARTERIAL: 463.8 MMHG (ref 60–80)
PO2 ARTERIAL: 556 MMHG (ref 60–80)
PO2: 103.3 MMHG (ref 75–100)
PO2: 118.8 MMHG (ref 75–100)
PO2: 212.8 MMHG (ref 75–100)
POTASSIUM SERPL-SCNC: 3.9 MMOL/L (ref 3.5–5)
POTASSIUM SERPL-SCNC: 4.2 MMOL/L (ref 3.5–5)
POTASSIUM SERPL-SCNC: 4.4 MMOL/L (ref 3.5–5.5)
POTASSIUM SERPL-SCNC: 4.7 MMOL/L (ref 3.5–5.5)
POTASSIUM SERPL-SCNC: 5 MMOL/L (ref 3.5–5.5)
PROTHROMBIN TIME: 17.6 SEC (ref 9.3–12.4)
PS: 8 CMH20
RBC # BLD: 3.05 E12/L (ref 3.8–5.8)
RBC # BLD: 3.82 E12/L (ref 3.8–5.8)
RI(T): 0.41
RI(T): 0.96
RR MECHANICAL: 10 B/MIN
SODIUM BLD-SCNC: 136 MMOL/L (ref 132–146)
SODIUM BLD-SCNC: 141 MMOL/L (ref 132–146)
SOURCE, BLOOD GAS: ABNORMAL
THB: 10 G/DL (ref 11.5–16.5)
THB: 9.5 G/DL (ref 11.5–16.5)
THB: 9.8 G/DL (ref 11.5–16.5)
TIME ANALYZED: 1551
TIME ANALYZED: 1647
TIME ANALYZED: 1803
VT MECHANICAL: 550 ML
WBC # BLD: 10.3 E9/L (ref 4.5–11.5)
WBC # BLD: 16.5 E9/L (ref 4.5–11.5)

## 2020-12-10 PROCEDURE — 5A1221Z PERFORMANCE OF CARDIAC OUTPUT, CONTINUOUS: ICD-10-PCS | Performed by: THORACIC SURGERY (CARDIOTHORACIC VASCULAR SURGERY)

## 2020-12-10 PROCEDURE — 82805 BLOOD GASES W/O2 SATURATION: CPT

## 2020-12-10 PROCEDURE — 021109W BYPASS CORONARY ARTERY, TWO ARTERIES FROM AORTA WITH AUTOLOGOUS VENOUS TISSUE, OPEN APPROACH: ICD-10-PCS | Performed by: THORACIC SURGERY (CARDIOTHORACIC VASCULAR SURGERY)

## 2020-12-10 PROCEDURE — 85347 COAGULATION TIME ACTIVATED: CPT

## 2020-12-10 PROCEDURE — 36415 COLL VENOUS BLD VENIPUNCTURE: CPT

## 2020-12-10 PROCEDURE — P9041 ALBUMIN (HUMAN),5%, 50ML: HCPCS | Performed by: NURSE ANESTHETIST, CERTIFIED REGISTERED

## 2020-12-10 PROCEDURE — 71045 X-RAY EXAM CHEST 1 VIEW: CPT

## 2020-12-10 PROCEDURE — 6370000000 HC RX 637 (ALT 250 FOR IP): Performed by: PHYSICIAN ASSISTANT

## 2020-12-10 PROCEDURE — 6370000000 HC RX 637 (ALT 250 FOR IP): Performed by: NURSE PRACTITIONER

## 2020-12-10 PROCEDURE — 94002 VENT MGMT INPAT INIT DAY: CPT

## 2020-12-10 PROCEDURE — 2500000003 HC RX 250 WO HCPCS

## 2020-12-10 PROCEDURE — 85027 COMPLETE CBC AUTOMATED: CPT

## 2020-12-10 PROCEDURE — 3700000000 HC ANESTHESIA ATTENDED CARE: Performed by: THORACIC SURGERY (CARDIOTHORACIC VASCULAR SURGERY)

## 2020-12-10 PROCEDURE — 6360000002 HC RX W HCPCS: Performed by: NURSE PRACTITIONER

## 2020-12-10 PROCEDURE — 82962 GLUCOSE BLOOD TEST: CPT

## 2020-12-10 PROCEDURE — 33508 ENDOSCOPIC VEIN HARVEST: CPT | Performed by: THORACIC SURGERY (CARDIOTHORACIC VASCULAR SURGERY)

## 2020-12-10 PROCEDURE — 6360000002 HC RX W HCPCS: Performed by: THORACIC SURGERY (CARDIOTHORACIC VASCULAR SURGERY)

## 2020-12-10 PROCEDURE — 99999 PR OFFICE/OUTPT VISIT,PROCEDURE ONLY: CPT | Performed by: THORACIC SURGERY (CARDIOTHORACIC VASCULAR SURGERY)

## 2020-12-10 PROCEDURE — 2720000010 HC SURG SUPPLY STERILE: Performed by: THORACIC SURGERY (CARDIOTHORACIC VASCULAR SURGERY)

## 2020-12-10 PROCEDURE — 80048 BASIC METABOLIC PNL TOTAL CA: CPT

## 2020-12-10 PROCEDURE — 6360000002 HC RX W HCPCS: Performed by: SPECIALIST

## 2020-12-10 PROCEDURE — 94640 AIRWAY INHALATION TREATMENT: CPT

## 2020-12-10 PROCEDURE — 82803 BLOOD GASES ANY COMBINATION: CPT

## 2020-12-10 PROCEDURE — 06BQ4ZZ EXCISION OF LEFT SAPHENOUS VEIN, PERCUTANEOUS ENDOSCOPIC APPROACH: ICD-10-PCS | Performed by: THORACIC SURGERY (CARDIOTHORACIC VASCULAR SURGERY)

## 2020-12-10 PROCEDURE — 2580000003 HC RX 258

## 2020-12-10 PROCEDURE — 2500000003 HC RX 250 WO HCPCS: Performed by: NURSE PRACTITIONER

## 2020-12-10 PROCEDURE — 6360000002 HC RX W HCPCS

## 2020-12-10 PROCEDURE — 33533 CABG ARTERIAL SINGLE: CPT | Performed by: THORACIC SURGERY (CARDIOTHORACIC VASCULAR SURGERY)

## 2020-12-10 PROCEDURE — 6360000002 HC RX W HCPCS: Performed by: PHYSICIAN ASSISTANT

## 2020-12-10 PROCEDURE — 2580000003 HC RX 258: Performed by: NURSE PRACTITIONER

## 2020-12-10 PROCEDURE — 99232 SBSQ HOSP IP/OBS MODERATE 35: CPT | Performed by: INTERNAL MEDICINE

## 2020-12-10 PROCEDURE — 0BH17EZ INSERTION OF ENDOTRACHEAL AIRWAY INTO TRACHEA, VIA NATURAL OR ARTIFICIAL OPENING: ICD-10-PCS | Performed by: INTERNAL MEDICINE

## 2020-12-10 PROCEDURE — 6370000000 HC RX 637 (ALT 250 FOR IP): Performed by: FAMILY MEDICINE

## 2020-12-10 PROCEDURE — 2580000003 HC RX 258: Performed by: THORACIC SURGERY (CARDIOTHORACIC VASCULAR SURGERY)

## 2020-12-10 PROCEDURE — P9045 ALBUMIN (HUMAN), 5%, 250 ML: HCPCS | Performed by: NURSE PRACTITIONER

## 2020-12-10 PROCEDURE — 02HV33Z INSERTION OF INFUSION DEVICE INTO SUPERIOR VENA CAVA, PERCUTANEOUS APPROACH: ICD-10-PCS | Performed by: ANESTHESIOLOGY

## 2020-12-10 PROCEDURE — 83735 ASSAY OF MAGNESIUM: CPT

## 2020-12-10 PROCEDURE — 37799 UNLISTED PX VASCULAR SURGERY: CPT

## 2020-12-10 PROCEDURE — 85610 PROTHROMBIN TIME: CPT

## 2020-12-10 PROCEDURE — 33518 CABG ARTERY-VEIN TWO: CPT | Performed by: THORACIC SURGERY (CARDIOTHORACIC VASCULAR SURGERY)

## 2020-12-10 PROCEDURE — 2000000000 HC ICU R&B

## 2020-12-10 PROCEDURE — 5A1935Z RESPIRATORY VENTILATION, LESS THAN 24 CONSECUTIVE HOURS: ICD-10-PCS | Performed by: INTERNAL MEDICINE

## 2020-12-10 PROCEDURE — 85730 THROMBOPLASTIN TIME PARTIAL: CPT

## 2020-12-10 PROCEDURE — 3600000008 HC SURGERY OHS BASE: Performed by: THORACIC SURGERY (CARDIOTHORACIC VASCULAR SURGERY)

## 2020-12-10 PROCEDURE — 3700000001 HC ADD 15 MINUTES (ANESTHESIA): Performed by: THORACIC SURGERY (CARDIOTHORACIC VASCULAR SURGERY)

## 2020-12-10 PROCEDURE — 02100Z9 BYPASS CORONARY ARTERY, ONE ARTERY FROM LEFT INTERNAL MAMMARY, OPEN APPROACH: ICD-10-PCS | Performed by: THORACIC SURGERY (CARDIOTHORACIC VASCULAR SURGERY)

## 2020-12-10 PROCEDURE — 2709999900 HC NON-CHARGEABLE SUPPLY: Performed by: THORACIC SURGERY (CARDIOTHORACIC VASCULAR SURGERY)

## 2020-12-10 PROCEDURE — C1729 CATH, DRAINAGE: HCPCS | Performed by: THORACIC SURGERY (CARDIOTHORACIC VASCULAR SURGERY)

## 2020-12-10 PROCEDURE — 6360000002 HC RX W HCPCS: Performed by: NURSE ANESTHETIST, CERTIFIED REGISTERED

## 2020-12-10 PROCEDURE — B24BZZ4 ULTRASONOGRAPHY OF HEART WITH AORTA, TRANSESOPHAGEAL: ICD-10-PCS | Performed by: ANESTHESIOLOGY

## 2020-12-10 PROCEDURE — 3600000018 HC SURGERY OHS ADDTL 15MIN: Performed by: THORACIC SURGERY (CARDIOTHORACIC VASCULAR SURGERY)

## 2020-12-10 PROCEDURE — C9113 INJ PANTOPRAZOLE SODIUM, VIA: HCPCS | Performed by: NURSE PRACTITIONER

## 2020-12-10 PROCEDURE — 99233 SBSQ HOSP IP/OBS HIGH 50: CPT | Performed by: NURSE PRACTITIONER

## 2020-12-10 PROCEDURE — 2580000003 HC RX 258: Performed by: FAMILY MEDICINE

## 2020-12-10 RX ORDER — ACETAMINOPHEN 650 MG/1
650 SUPPOSITORY RECTAL EVERY 4 HOURS PRN
Status: DISCONTINUED | OUTPATIENT
Start: 2020-12-10 | End: 2020-12-11

## 2020-12-10 RX ORDER — ALBUMIN, HUMAN INJ 5% 5 %
25 SOLUTION INTRAVENOUS ONCE
Status: COMPLETED | OUTPATIENT
Start: 2020-12-10 | End: 2020-12-10

## 2020-12-10 RX ORDER — SENNA AND DOCUSATE SODIUM 50; 8.6 MG/1; MG/1
1 TABLET, FILM COATED ORAL 2 TIMES DAILY
Status: DISCONTINUED | OUTPATIENT
Start: 2020-12-10 | End: 2020-12-11

## 2020-12-10 RX ORDER — ALBUMIN, HUMAN INJ 5% 5 %
SOLUTION INTRAVENOUS
Status: DISCONTINUED
Start: 2020-12-10 | End: 2020-12-11

## 2020-12-10 RX ORDER — PROTAMINE SULFATE 10 MG/ML
INJECTION, SOLUTION INTRAVENOUS PRN
Status: DISCONTINUED | OUTPATIENT
Start: 2020-12-10 | End: 2020-12-10 | Stop reason: SDUPTHER

## 2020-12-10 RX ORDER — ALBUMIN, HUMAN INJ 5% 5 %
SOLUTION INTRAVENOUS PRN
Status: DISCONTINUED | OUTPATIENT
Start: 2020-12-10 | End: 2020-12-10 | Stop reason: SDUPTHER

## 2020-12-10 RX ORDER — MAGNESIUM HYDROXIDE/ALUMINUM HYDROXICE/SIMETHICONE 120; 1200; 1200 MG/30ML; MG/30ML; MG/30ML
30 SUSPENSION ORAL EVERY 4 HOURS PRN
Status: DISCONTINUED | OUTPATIENT
Start: 2020-12-10 | End: 2020-12-11

## 2020-12-10 RX ORDER — INSULIN GLARGINE 100 [IU]/ML
0.15 INJECTION, SOLUTION SUBCUTANEOUS NIGHTLY
Status: DISCONTINUED | OUTPATIENT
Start: 2020-12-11 | End: 2020-12-11

## 2020-12-10 RX ORDER — MEPERIDINE HYDROCHLORIDE 50 MG/ML
25 INJECTION INTRAMUSCULAR; INTRAVENOUS; SUBCUTANEOUS
Status: ACTIVE | OUTPATIENT
Start: 2020-12-10 | End: 2020-12-10

## 2020-12-10 RX ORDER — MIDAZOLAM HYDROCHLORIDE 1 MG/ML
INJECTION INTRAMUSCULAR; INTRAVENOUS PRN
Status: DISCONTINUED | OUTPATIENT
Start: 2020-12-10 | End: 2020-12-10 | Stop reason: SDUPTHER

## 2020-12-10 RX ORDER — PROPOFOL 10 MG/ML
10 INJECTION, EMULSION INTRAVENOUS CONTINUOUS PRN
Status: DISCONTINUED | OUTPATIENT
Start: 2020-12-10 | End: 2020-12-11

## 2020-12-10 RX ORDER — AMINOCAPROIC ACID 250 MG/ML
INJECTION, SOLUTION INTRAVENOUS PRN
Status: DISCONTINUED | OUTPATIENT
Start: 2020-12-10 | End: 2020-12-10 | Stop reason: SDUPTHER

## 2020-12-10 RX ORDER — SODIUM CHLORIDE 9 MG/ML
30 INJECTION, SOLUTION INTRAVENOUS CONTINUOUS
Status: DISCONTINUED | OUTPATIENT
Start: 2020-12-10 | End: 2020-12-11

## 2020-12-10 RX ORDER — POTASSIUM CHLORIDE 29.8 MG/ML
20 INJECTION INTRAVENOUS PRN
Status: DISCONTINUED | OUTPATIENT
Start: 2020-12-10 | End: 2020-12-11

## 2020-12-10 RX ORDER — GLYCOPYRROLATE 1 MG/5 ML
SYRINGE (ML) INTRAVENOUS PRN
Status: DISCONTINUED | OUTPATIENT
Start: 2020-12-10 | End: 2020-12-10 | Stop reason: SDUPTHER

## 2020-12-10 RX ORDER — OXYCODONE HYDROCHLORIDE 5 MG/1
5 TABLET ORAL EVERY 4 HOURS PRN
Status: DISCONTINUED | OUTPATIENT
Start: 2020-12-10 | End: 2020-12-11

## 2020-12-10 RX ORDER — SODIUM CHLORIDE 0.9 % (FLUSH) 0.9 %
10 SYRINGE (ML) INJECTION EVERY 12 HOURS SCHEDULED
Status: DISCONTINUED | OUTPATIENT
Start: 2020-12-10 | End: 2020-12-14 | Stop reason: HOSPADM

## 2020-12-10 RX ORDER — IPRATROPIUM BROMIDE AND ALBUTEROL SULFATE 2.5; .5 MG/3ML; MG/3ML
1 SOLUTION RESPIRATORY (INHALATION)
Status: DISCONTINUED | OUTPATIENT
Start: 2020-12-10 | End: 2020-12-14 | Stop reason: HOSPADM

## 2020-12-10 RX ORDER — ACETAMINOPHEN 325 MG/1
650 TABLET ORAL EVERY 4 HOURS PRN
Status: DISCONTINUED | OUTPATIENT
Start: 2020-12-10 | End: 2020-12-11

## 2020-12-10 RX ORDER — ATORVASTATIN CALCIUM 40 MG/1
40 TABLET, FILM COATED ORAL NIGHTLY
Status: DISCONTINUED | OUTPATIENT
Start: 2020-12-11 | End: 2020-12-14 | Stop reason: HOSPADM

## 2020-12-10 RX ORDER — OXYCODONE HYDROCHLORIDE 10 MG/1
10 TABLET ORAL EVERY 4 HOURS PRN
Status: DISCONTINUED | OUTPATIENT
Start: 2020-12-10 | End: 2020-12-11

## 2020-12-10 RX ORDER — HEPARIN SODIUM 10000 [USP'U]/ML
INJECTION, SOLUTION INTRAVENOUS; SUBCUTANEOUS PRN
Status: DISCONTINUED | OUTPATIENT
Start: 2020-12-10 | End: 2020-12-10 | Stop reason: SDUPTHER

## 2020-12-10 RX ORDER — PHENYLEPHRINE HYDROCHLORIDE 10 MG/ML
INJECTION INTRAVENOUS PRN
Status: DISCONTINUED | OUTPATIENT
Start: 2020-12-10 | End: 2020-12-10 | Stop reason: SDUPTHER

## 2020-12-10 RX ORDER — PANTOPRAZOLE SODIUM 40 MG/10ML
40 INJECTION, POWDER, LYOPHILIZED, FOR SOLUTION INTRAVENOUS DAILY
Status: COMPLETED | OUTPATIENT
Start: 2020-12-10 | End: 2020-12-10

## 2020-12-10 RX ORDER — 0.9 % SODIUM CHLORIDE 0.9 %
250 INTRAVENOUS SOLUTION INTRAVENOUS CONTINUOUS PRN
Status: DISCONTINUED | OUTPATIENT
Start: 2020-12-10 | End: 2020-12-11

## 2020-12-10 RX ORDER — PROPOFOL 10 MG/ML
INJECTION, EMULSION INTRAVENOUS PRN
Status: DISCONTINUED | OUTPATIENT
Start: 2020-12-10 | End: 2020-12-10 | Stop reason: SDUPTHER

## 2020-12-10 RX ORDER — MAGNESIUM SULFATE IN WATER 40 MG/ML
2 INJECTION, SOLUTION INTRAVENOUS PRN
Status: DISCONTINUED | OUTPATIENT
Start: 2020-12-10 | End: 2020-12-11

## 2020-12-10 RX ORDER — TAMSULOSIN HYDROCHLORIDE 0.4 MG/1
0.4 CAPSULE ORAL DAILY
Status: DISCONTINUED | OUTPATIENT
Start: 2020-12-11 | End: 2020-12-14 | Stop reason: HOSPADM

## 2020-12-10 RX ORDER — EPINEPHRINE 1 MG/ML
INJECTION INTRAMUSCULAR; INTRAVENOUS; SUBCUTANEOUS PRN
Status: DISCONTINUED | OUTPATIENT
Start: 2020-12-10 | End: 2020-12-10 | Stop reason: SDUPTHER

## 2020-12-10 RX ORDER — SODIUM CHLORIDE, SODIUM LACTATE, POTASSIUM CHLORIDE, CALCIUM CHLORIDE 600; 310; 30; 20 MG/100ML; MG/100ML; MG/100ML; MG/100ML
INJECTION, SOLUTION INTRAVENOUS CONTINUOUS PRN
Status: DISCONTINUED | OUTPATIENT
Start: 2020-12-10 | End: 2020-12-10 | Stop reason: SDUPTHER

## 2020-12-10 RX ORDER — DEXTROSE MONOHYDRATE 50 MG/ML
100 INJECTION, SOLUTION INTRAVENOUS PRN
Status: DISCONTINUED | OUTPATIENT
Start: 2020-12-10 | End: 2020-12-14 | Stop reason: HOSPADM

## 2020-12-10 RX ORDER — ASPIRIN 300 MG/1
300 SUPPOSITORY RECTAL ONCE
Status: COMPLETED | OUTPATIENT
Start: 2020-12-10 | End: 2020-12-10

## 2020-12-10 RX ORDER — ALBUMIN, HUMAN INJ 5% 5 %
25 SOLUTION INTRAVENOUS PRN
Status: DISCONTINUED | OUTPATIENT
Start: 2020-12-10 | End: 2020-12-11

## 2020-12-10 RX ORDER — PROPOFOL 10 MG/ML
INJECTION, EMULSION INTRAVENOUS
Status: DISCONTINUED
Start: 2020-12-10 | End: 2020-12-11

## 2020-12-10 RX ORDER — FENTANYL CITRATE 50 UG/ML
50 INJECTION, SOLUTION INTRAMUSCULAR; INTRAVENOUS
Status: DISCONTINUED | OUTPATIENT
Start: 2020-12-10 | End: 2020-12-11

## 2020-12-10 RX ORDER — FENTANYL CITRATE 0.05 MG/ML
INJECTION, SOLUTION INTRAMUSCULAR; INTRAVENOUS PRN
Status: DISCONTINUED | OUTPATIENT
Start: 2020-12-10 | End: 2020-12-10 | Stop reason: SDUPTHER

## 2020-12-10 RX ORDER — NICOTINE POLACRILEX 4 MG
15 LOZENGE BUCCAL PRN
Status: DISCONTINUED | OUTPATIENT
Start: 2020-12-10 | End: 2020-12-14 | Stop reason: HOSPADM

## 2020-12-10 RX ORDER — CLOPIDOGREL BISULFATE 75 MG/1
75 TABLET ORAL DAILY
Status: DISCONTINUED | OUTPATIENT
Start: 2020-12-11 | End: 2020-12-14 | Stop reason: HOSPADM

## 2020-12-10 RX ORDER — NEOSTIGMINE METHYLSULFATE 1 MG/ML
INJECTION, SOLUTION INTRAVENOUS PRN
Status: DISCONTINUED | OUTPATIENT
Start: 2020-12-10 | End: 2020-12-10 | Stop reason: SDUPTHER

## 2020-12-10 RX ORDER — PANTOPRAZOLE SODIUM 40 MG/1
40 TABLET, DELAYED RELEASE ORAL DAILY
Status: DISCONTINUED | OUTPATIENT
Start: 2020-12-11 | End: 2020-12-11

## 2020-12-10 RX ORDER — SODIUM CHLORIDE 9 MG/ML
10 INJECTION INTRAVENOUS DAILY
Status: COMPLETED | OUTPATIENT
Start: 2020-12-10 | End: 2020-12-10

## 2020-12-10 RX ORDER — FENTANYL CITRATE 50 UG/ML
25 INJECTION, SOLUTION INTRAMUSCULAR; INTRAVENOUS
Status: DISCONTINUED | OUTPATIENT
Start: 2020-12-10 | End: 2020-12-11

## 2020-12-10 RX ORDER — SODIUM CHLORIDE 0.9 % (FLUSH) 0.9 %
10 SYRINGE (ML) INJECTION PRN
Status: DISCONTINUED | OUTPATIENT
Start: 2020-12-10 | End: 2020-12-14 | Stop reason: HOSPADM

## 2020-12-10 RX ORDER — ONDANSETRON 2 MG/ML
4 INJECTION INTRAMUSCULAR; INTRAVENOUS EVERY 8 HOURS PRN
Status: DISCONTINUED | OUTPATIENT
Start: 2020-12-10 | End: 2020-12-11

## 2020-12-10 RX ORDER — ASPIRIN 81 MG/1
81 TABLET ORAL DAILY
Status: DISCONTINUED | OUTPATIENT
Start: 2020-12-11 | End: 2020-12-11

## 2020-12-10 RX ORDER — SODIUM CHLORIDE 9 MG/ML
INJECTION, SOLUTION INTRAVENOUS CONTINUOUS PRN
Status: DISCONTINUED | OUTPATIENT
Start: 2020-12-10 | End: 2020-12-10

## 2020-12-10 RX ORDER — CHLORHEXIDINE GLUCONATE 0.12 MG/ML
15 RINSE ORAL 2 TIMES DAILY
Status: DISCONTINUED | OUTPATIENT
Start: 2020-12-10 | End: 2020-12-11

## 2020-12-10 RX ORDER — DEXTROSE MONOHYDRATE 25 G/50ML
12.5 INJECTION, SOLUTION INTRAVENOUS PRN
Status: DISCONTINUED | OUTPATIENT
Start: 2020-12-10 | End: 2020-12-14 | Stop reason: HOSPADM

## 2020-12-10 RX ORDER — VECURONIUM BROMIDE 1 MG/ML
INJECTION, POWDER, LYOPHILIZED, FOR SOLUTION INTRAVENOUS PRN
Status: DISCONTINUED | OUTPATIENT
Start: 2020-12-10 | End: 2020-12-10 | Stop reason: SDUPTHER

## 2020-12-10 RX ADMIN — GENTAMICIN SULFATE 140 MG: 40 INJECTION, SOLUTION INTRAMUSCULAR; INTRAVENOUS at 11:22

## 2020-12-10 RX ADMIN — PHENYLEPHRINE HYDROCHLORIDE 100 MCG: 10 INJECTION INTRAVENOUS at 11:19

## 2020-12-10 RX ADMIN — IPRATROPIUM BROMIDE AND ALBUTEROL SULFATE 1 AMPULE: .5; 3 SOLUTION RESPIRATORY (INHALATION) at 15:46

## 2020-12-10 RX ADMIN — PROPOFOL 150 MG: 10 INJECTION, EMULSION INTRAVENOUS at 10:48

## 2020-12-10 RX ADMIN — Medication 3 MG: at 14:55

## 2020-12-10 RX ADMIN — MUPIROCIN: 20 OINTMENT TOPICAL at 21:10

## 2020-12-10 RX ADMIN — PHENYLEPHRINE HYDROCHLORIDE 100 MCG: 10 INJECTION INTRAVENOUS at 11:41

## 2020-12-10 RX ADMIN — MUPIROCIN: 20 OINTMENT TOPICAL at 08:41

## 2020-12-10 RX ADMIN — Medication 0.6 MG: at 14:55

## 2020-12-10 RX ADMIN — Medication 2 G: at 18:56

## 2020-12-10 RX ADMIN — METOPROLOL TARTRATE 12.5 MG: 25 TABLET, FILM COATED ORAL at 05:03

## 2020-12-10 RX ADMIN — MIDAZOLAM 2 MG: 1 INJECTION INTRAMUSCULAR; INTRAVENOUS at 10:45

## 2020-12-10 RX ADMIN — SODIUM CHLORIDE, POTASSIUM CHLORIDE, SODIUM LACTATE AND CALCIUM CHLORIDE: 600; 310; 30; 20 INJECTION, SOLUTION INTRAVENOUS at 10:37

## 2020-12-10 RX ADMIN — SODIUM CHLORIDE, PRESERVATIVE FREE 10 ML: 5 INJECTION INTRAVENOUS at 08:41

## 2020-12-10 RX ADMIN — PHENYLEPHRINE HYDROCHLORIDE 100 MCG: 10 INJECTION INTRAVENOUS at 11:55

## 2020-12-10 RX ADMIN — FENTANYL CITRATE 200 MCG: 50 INJECTION, SOLUTION INTRAMUSCULAR; INTRAVENOUS at 10:48

## 2020-12-10 RX ADMIN — ALBUMIN (HUMAN) 25 G: 12.5 INJECTION, SOLUTION INTRAVENOUS at 17:24

## 2020-12-10 RX ADMIN — PROPOFOL 10 MCG/KG/MIN: 10 INJECTION, EMULSION INTRAVENOUS at 14:55

## 2020-12-10 RX ADMIN — ALBUMIN (HUMAN) 12.5 G: 12.5 INJECTION, SOLUTION INTRAVENOUS at 14:55

## 2020-12-10 RX ADMIN — PROTAMINE SULFATE 350 MG: 10 INJECTION, SOLUTION INTRAVENOUS at 13:33

## 2020-12-10 RX ADMIN — ALBUMIN (HUMAN) 12.5 G: 12.5 INJECTION, SOLUTION INTRAVENOUS at 15:06

## 2020-12-10 RX ADMIN — INSULIN LISPRO 3 UNITS: 100 INJECTION, SOLUTION INTRAVENOUS; SUBCUTANEOUS at 15:10

## 2020-12-10 RX ADMIN — SODIUM CHLORIDE, PRESERVATIVE FREE 10 ML: 5 INJECTION INTRAVENOUS at 21:10

## 2020-12-10 RX ADMIN — EPINEPHRINE 5 MCG: 1 INJECTION INTRAMUSCULAR; INTRAVENOUS; SUBCUTANEOUS at 13:37

## 2020-12-10 RX ADMIN — FENTANYL CITRATE 200 MCG: 50 INJECTION, SOLUTION INTRAMUSCULAR; INTRAVENOUS at 11:10

## 2020-12-10 RX ADMIN — AMINOCAPROIC ACID 5000 MG: 250 INJECTION, SOLUTION INTRAVENOUS at 10:48

## 2020-12-10 RX ADMIN — FENTANYL CITRATE 200 MCG: 50 INJECTION, SOLUTION INTRAMUSCULAR; INTRAVENOUS at 11:15

## 2020-12-10 RX ADMIN — MIDAZOLAM 1 MG: 1 INJECTION INTRAMUSCULAR; INTRAVENOUS at 09:55

## 2020-12-10 RX ADMIN — PHENYLEPHRINE HYDROCHLORIDE 100 MCG: 10 INJECTION INTRAVENOUS at 11:29

## 2020-12-10 RX ADMIN — Medication 2 G: at 10:48

## 2020-12-10 RX ADMIN — PHENYLEPHRINE HYDROCHLORIDE 100 MCG: 10 INJECTION INTRAVENOUS at 11:00

## 2020-12-10 RX ADMIN — FENTANYL CITRATE 50 MCG: 50 INJECTION, SOLUTION INTRAMUSCULAR; INTRAVENOUS at 16:03

## 2020-12-10 RX ADMIN — SODIUM CHLORIDE 30 ML/HR: 9 INJECTION, SOLUTION INTRAVENOUS at 15:00

## 2020-12-10 RX ADMIN — MIDAZOLAM 1 MG: 1 INJECTION INTRAMUSCULAR; INTRAVENOUS at 10:00

## 2020-12-10 RX ADMIN — SODIUM CHLORIDE, PRESERVATIVE FREE 10 ML: 5 INJECTION INTRAVENOUS at 17:13

## 2020-12-10 RX ADMIN — FENTANYL CITRATE 25 MCG: 50 INJECTION, SOLUTION INTRAMUSCULAR; INTRAVENOUS at 20:12

## 2020-12-10 RX ADMIN — ASPIRIN 81 MG CHEWABLE TABLET 81 MG: 81 TABLET CHEWABLE at 08:45

## 2020-12-10 RX ADMIN — AMINOCAPROIC ACID 1 G/HR: 250 INJECTION, SOLUTION INTRAVENOUS at 10:56

## 2020-12-10 RX ADMIN — PHENYLEPHRINE HYDROCHLORIDE 100 MCG: 10 INJECTION INTRAVENOUS at 11:57

## 2020-12-10 RX ADMIN — FENTANYL CITRATE 200 MCG: 50 INJECTION, SOLUTION INTRAMUSCULAR; INTRAVENOUS at 10:56

## 2020-12-10 RX ADMIN — ALBUMIN (HUMAN) 500 ML: 12.5 INJECTION, SOLUTION INTRAVENOUS at 14:25

## 2020-12-10 RX ADMIN — Medication 50 MEQ: at 16:44

## 2020-12-10 RX ADMIN — HEPARIN SODIUM 40000 UNITS: 10000 INJECTION, SOLUTION INTRAVENOUS; SUBCUTANEOUS at 11:38

## 2020-12-10 RX ADMIN — VECURONIUM BROMIDE 10 MG: 10 INJECTION, POWDER, LYOPHILIZED, FOR SOLUTION INTRAVENOUS at 11:25

## 2020-12-10 RX ADMIN — VECURONIUM BROMIDE 5 MG: 10 INJECTION, POWDER, LYOPHILIZED, FOR SOLUTION INTRAVENOUS at 10:48

## 2020-12-10 RX ADMIN — PANTOPRAZOLE SODIUM 40 MG: 40 INJECTION, POWDER, FOR SOLUTION INTRAVENOUS at 17:12

## 2020-12-10 RX ADMIN — FENTANYL CITRATE 200 MCG: 50 INJECTION, SOLUTION INTRAMUSCULAR; INTRAVENOUS at 11:05

## 2020-12-10 RX ADMIN — PHENYLEPHRINE HYDROCHLORIDE 100 MCG: 10 INJECTION INTRAVENOUS at 10:50

## 2020-12-10 RX ADMIN — Medication 4 MCG/MIN: at 15:00

## 2020-12-10 RX ADMIN — CHLORHEXIDINE GLUCONATE 15 ML: 1.2 RINSE ORAL at 21:10

## 2020-12-10 RX ADMIN — IPRATROPIUM BROMIDE AND ALBUTEROL SULFATE 1 AMPULE: .5; 3 SOLUTION RESPIRATORY (INHALATION) at 19:55

## 2020-12-10 RX ADMIN — INSULIN LISPRO 3 UNITS: 100 INJECTION, SOLUTION INTRAVENOUS; SUBCUTANEOUS at 20:27

## 2020-12-10 RX ADMIN — PHENYLEPHRINE HYDROCHLORIDE 100 MCG: 10 INJECTION INTRAVENOUS at 11:11

## 2020-12-10 RX ADMIN — ASPIRIN 300 MG: 300 SUPPOSITORY RECTAL at 17:12

## 2020-12-10 ASSESSMENT — PULMONARY FUNCTION TESTS
PIF_VALUE: 16
PIF_VALUE: 1
PIF_VALUE: 17
PIF_VALUE: 17
PIF_VALUE: 16
PIF_VALUE: 18
PIF_VALUE: 18
PIF_VALUE: 14
PIF_VALUE: 1
PIF_VALUE: 16
PIF_VALUE: 16
PIF_VALUE: 17
PIF_VALUE: 16
PIF_VALUE: 17
PIF_VALUE: 16
PIF_VALUE: 16
PIF_VALUE: 18
PIF_VALUE: 15
PIF_VALUE: 1
PIF_VALUE: 1
PIF_VALUE: 16
PIF_VALUE: 18
PIF_VALUE: 1
PIF_VALUE: 15
PIF_VALUE: 13
PIF_VALUE: 2
PIF_VALUE: 17
PIF_VALUE: 15
PIF_VALUE: 1
PIF_VALUE: 18
PIF_VALUE: 0
PIF_VALUE: 17
PIF_VALUE: 15
PIF_VALUE: 15
PIF_VALUE: 17
PIF_VALUE: 1
PIF_VALUE: 19
PIF_VALUE: 17
PIF_VALUE: 19
PIF_VALUE: 16
PIF_VALUE: 1
PIF_VALUE: 0
PIF_VALUE: 15
PIF_VALUE: 19
PIF_VALUE: 1
PIF_VALUE: 16
PIF_VALUE: 1
PIF_VALUE: 14
PIF_VALUE: 15
PIF_VALUE: 19
PIF_VALUE: 1
PIF_VALUE: 18
PIF_VALUE: 16
PIF_VALUE: 17
PIF_VALUE: 16
PIF_VALUE: 15
PIF_VALUE: 1
PIF_VALUE: 18
PIF_VALUE: 1
PIF_VALUE: 15
PIF_VALUE: 1
PIF_VALUE: 15
PIF_VALUE: 14
PIF_VALUE: 1
PIF_VALUE: 1
PIF_VALUE: 16
PIF_VALUE: 19
PIF_VALUE: 17
PIF_VALUE: 1
PIF_VALUE: 18
PIF_VALUE: 14
PIF_VALUE: 1
PIF_VALUE: 18
PIF_VALUE: 16
PIF_VALUE: 19
PIF_VALUE: 18
PIF_VALUE: 16
PIF_VALUE: 22
PIF_VALUE: 1
PIF_VALUE: 15
PIF_VALUE: 18
PIF_VALUE: 1
PIF_VALUE: 18
PIF_VALUE: 18
PIF_VALUE: 1
PIF_VALUE: 18
PIF_VALUE: 15
PIF_VALUE: 1
PIF_VALUE: 18
PIF_VALUE: 1
PIF_VALUE: 17
PIF_VALUE: 1
PIF_VALUE: 18
PIF_VALUE: 14
PIF_VALUE: 16
PIF_VALUE: 1
PIF_VALUE: 1
PIF_VALUE: 18
PIF_VALUE: 1
PIF_VALUE: 16
PIF_VALUE: 16
PIF_VALUE: 17
PIF_VALUE: 18
PIF_VALUE: 19
PIF_VALUE: 16
PIF_VALUE: 15
PIF_VALUE: 16
PIF_VALUE: 1
PIF_VALUE: 21
PIF_VALUE: 18
PIF_VALUE: 17
PIF_VALUE: 19
PIF_VALUE: 1
PIF_VALUE: 17
PIF_VALUE: 17
PIF_VALUE: 1
PIF_VALUE: 1
PIF_VALUE: 17
PIF_VALUE: 14
PIF_VALUE: 18
PIF_VALUE: 17
PIF_VALUE: 23
PIF_VALUE: 1
PIF_VALUE: 0
PIF_VALUE: 16
PIF_VALUE: 22
PIF_VALUE: 18
PIF_VALUE: 1
PIF_VALUE: 17
PIF_VALUE: 4
PIF_VALUE: 1
PIF_VALUE: 18
PIF_VALUE: 16
PIF_VALUE: 18
PIF_VALUE: 17
PIF_VALUE: 14
PIF_VALUE: 0
PIF_VALUE: 17
PIF_VALUE: 14
PIF_VALUE: 16
PIF_VALUE: 1
PIF_VALUE: 15
PIF_VALUE: 17
PIF_VALUE: 1
PIF_VALUE: 1
PIF_VALUE: 19
PIF_VALUE: 1
PIF_VALUE: 1
PIF_VALUE: 17
PIF_VALUE: 1
PIF_VALUE: 17
PIF_VALUE: 1
PIF_VALUE: 18
PIF_VALUE: 15
PIF_VALUE: 1
PIF_VALUE: 18
PIF_VALUE: 18
PIF_VALUE: 1
PIF_VALUE: 17
PIF_VALUE: 18
PIF_VALUE: 17
PIF_VALUE: 18
PIF_VALUE: 1
PIF_VALUE: 17
PIF_VALUE: 1
PIF_VALUE: 17
PIF_VALUE: 2
PIF_VALUE: 19
PIF_VALUE: 25
PIF_VALUE: 1
PIF_VALUE: 14
PIF_VALUE: 1
PIF_VALUE: 19
PIF_VALUE: 15
PIF_VALUE: 18
PIF_VALUE: 17
PIF_VALUE: 1
PIF_VALUE: 16
PIF_VALUE: 18
PIF_VALUE: 15
PIF_VALUE: 21
PIF_VALUE: 2
PIF_VALUE: 15
PIF_VALUE: 17
PIF_VALUE: 1
PIF_VALUE: 1
PIF_VALUE: 14
PIF_VALUE: 1
PIF_VALUE: 16
PIF_VALUE: 18
PIF_VALUE: 16
PIF_VALUE: 1
PIF_VALUE: 1
PIF_VALUE: 18
PIF_VALUE: 1
PIF_VALUE: 17
PIF_VALUE: 15
PIF_VALUE: 17
PIF_VALUE: 1
PIF_VALUE: 18
PIF_VALUE: 15
PIF_VALUE: 18
PIF_VALUE: 1
PIF_VALUE: 1
PIF_VALUE: 15
PIF_VALUE: 18
PIF_VALUE: 21
PIF_VALUE: 16
PIF_VALUE: 17
PIF_VALUE: 16
PIF_VALUE: 16
PIF_VALUE: 1
PIF_VALUE: 2
PIF_VALUE: 0
PIF_VALUE: 16
PIF_VALUE: 16
PIF_VALUE: 18
PIF_VALUE: 17
PIF_VALUE: 15
PIF_VALUE: 19
PIF_VALUE: 18
PIF_VALUE: 15
PIF_VALUE: 1
PIF_VALUE: 18
PIF_VALUE: 15
PIF_VALUE: 16
PIF_VALUE: 17
PIF_VALUE: 0
PIF_VALUE: 15
PIF_VALUE: 16
PIF_VALUE: 17
PIF_VALUE: 19
PIF_VALUE: 15
PIF_VALUE: 17
PIF_VALUE: 18
PIF_VALUE: 1
PIF_VALUE: 15
PIF_VALUE: 17
PIF_VALUE: 18
PIF_VALUE: 17
PIF_VALUE: 21
PIF_VALUE: 16
PIF_VALUE: 15
PIF_VALUE: 1
PIF_VALUE: 16
PIF_VALUE: 1
PIF_VALUE: 18
PIF_VALUE: 22

## 2020-12-10 ASSESSMENT — PAIN - FUNCTIONAL ASSESSMENT: PAIN_FUNCTIONAL_ASSESSMENT: ACTIVITIES ARE NOT PREVENTED

## 2020-12-10 ASSESSMENT — PAIN SCALES - GENERAL
PAINLEVEL_OUTOF10: 6
PAINLEVEL_OUTOF10: 0
PAINLEVEL_OUTOF10: 4

## 2020-12-10 ASSESSMENT — PAIN DESCRIPTION - ONSET: ONSET: ON-GOING

## 2020-12-10 ASSESSMENT — PAIN DESCRIPTION - LOCATION: LOCATION: CHEST;GENERALIZED;INCISION

## 2020-12-10 ASSESSMENT — PAIN DESCRIPTION - FREQUENCY: FREQUENCY: CONTINUOUS

## 2020-12-10 ASSESSMENT — PAIN DESCRIPTION - PROGRESSION: CLINICAL_PROGRESSION: NOT CHANGED

## 2020-12-10 ASSESSMENT — PAIN DESCRIPTION - DESCRIPTORS: DESCRIPTORS: SHARP;SORE;TENDER

## 2020-12-10 ASSESSMENT — ENCOUNTER SYMPTOMS: SHORTNESS OF BREATH: 0

## 2020-12-10 ASSESSMENT — LIFESTYLE VARIABLES: SMOKING_STATUS: 0

## 2020-12-10 ASSESSMENT — PAIN DESCRIPTION - PAIN TYPE: TYPE: SURGICAL PAIN;ACUTE PAIN

## 2020-12-10 NOTE — FLOWSHEET NOTE
Addmitted to cvic post cabg. Intubated and sedated. Vss. Chest tubes to neg. 20 suction,draining serosang. drainage. Estrada with bloody urine,no clots.  and THOM Poe NP at bedside.

## 2020-12-10 NOTE — PROGRESS NOTES
INPATIENT CARDIOLOGY FOLLOW-UP    Name: Mackenzie Mahmood    Age: 70 y.o. Date of Admission: 12/6/2020 10:08 PM    Date of Service: 12/10/2020    Chief Complaint: Follow-up for coronary atherosclerosis, non-ST elevation myocardial infarction, hypertension    Interim History: The patient underwent surgical revascularization providing complete revascularization with no initial perioperative complications and present needs of mechanical ventilation.       Review of Systems:   Based on the patient's medical condition, a review of systems cannot be obtained    Problem List:  Patient Active Problem List   Diagnosis    Chest pain    Anemia    Leukocytosis    HTN (hypertension)    History of rectal cancer    NSTEMI (non-ST elevated myocardial infarction) ([facility] [address]       Allergies:  No Known Allergies    Current Medications:  Current Facility-Administered Medications   Medication Dose Route Frequency Provider Last Rate Last Dose    [START ON 12/11/2020] atorvastatin (LIPITOR) tablet 40 mg  40 mg Oral Nightly Charlotte Juliocesar, APRN - CNP        0.9 % sodium chloride infusion  30 mL/hr Intravenous Continuous Charlotte Juliocesar, APRN - CNP        sodium chloride flush 0.9 % injection 10 mL  10 mL Intravenous 2 times per day Charlotte Juliocesar, APRN - CNP        sodium chloride flush 0.9 % injection 10 mL  10 mL Intravenous PRN Charlotte Juliocesar, APRN - CNP        potassium chloride 20 mEq/50 mL IVPB (Central Line)  20 mEq Intravenous PRN Charlotte Juliocesar, APRN - CNP        magnesium sulfate 2 g in 50 mL IVPB premix  2 g Intravenous PRN Charlotte Juliocesar, APRN - CNP        ceFAZolin (ANCEF) 2 g in sterile water 20 mL IV syringe  2 g Intravenous Q8H Charlotte Juliocesar, APRN - CNP        acetaminophen (TYLENOL) tablet 650 mg  650 mg Oral Q4H PRN Charlotte Juliocesar, APRN - CNP        acetaminophen (TYLENOL) suppository 650 mg  650 mg Rectal Q4H PRN Charlotte Juliocesar, APRN - CNP        oxyCODONE (ROXICODONE) immediate release tablet 5 mg  5 mg Oral Q4H PRN [facility] S[facility], APRN - ROLANDO        Or    oxyCODONE HCl (OXY-IR) immediate release tablet 10 mg  10 mg Oral Q4H PRN Karole Sicks, AR[place] - CNP        fentaNYL (SUBLIMAZE) injection 25 mcg  25 mcg Intravenous Q1H PRN [facility], APRN - CNP        Or    fentaNYL (SUBLIMAZE) injection 50 mcg  50 mcg Intravenous Q1H PRN [facility], [place] - CNP        sennosides-docusate sodium (SENOKOT-S) 8.6-50 MG tablet 1 tablet  1 tablet Oral BID Karole Sicks, APRN - CNP        magnesium hydroxide (MILK OF MAGNESIA) 400 MG/5ML suspension 30 mL  30 mL Oral Daily PRN [facility] S[facility], [place] - ROLANDO        ondansetron [facility] injection 4 mg  4 mg Intravenous Q8H PRN [facility], [place] - CNP        [START ON 12/11/2020] pantoprazole (PROTONIX) tablet 40 mg  40 mg Oral Daily Karole Sicks, APRN - ROLANDO        pantoprazole (PROTONIX) injection 40 mg  40 mg Intravenous Daily [facility], [place] - CNP        And    sodium chloride (PF) 0.9 % injection 10 mL  10 mL Intravenous Daily [facility], [place] - CNP        chlorhexidine (PERIDEX) 0.12 % solution 15 mL  15 mL Mouth/Throat BID [facility], [place] - CNP        [START ON 12/11/2020] magnesium oxide (MAG-OX) tablet 400 mg  400 mg Oral Daily Karole Sicks, [place] - CNP        mupirocin (BACTROBAN) 2 % ointment   Nasal BID [facility], [place] - CNP        propofol injection  10 mcg/kg/min Intravenous Continuous PRN [facility], [place] - CNP        [START ON 12/11/2020] aspirin EC tablet 81 mg  81 mg Oral Daily Karole Sicks, APRN - CNP        aspirin suppository 300 mg  300 mg Rectal Once Karole Sicks, [place] - ROLANDO        meperidine (DEMEROL) injection 25 mg  25 mg Intravenous Once PRN [facility], APRN - CNP        ipratropium-albuterol (DUONEB) nebulizer solution 1 ampule  1 ampule Inhalation Q4H [facility] Sicks, ARG[place] - CNP   1 ampule at 12/10/20 1546    albumin human 5 % IV solution 25 g  25 g Intravenous PRN Yareli Singh, APRN - CNP        0.9 % sodium chloride bolus  250 mL Intravenous Continuous PRN Te Foley, APRN - ROLANDO        norepinephrine (LEVOPHED) 16 mg in dextrose 5% 250 mL infusion  2 mcg/min Intravenous Continuous PRN Te Foley, ARG[place] - CNP        clevidipine (CLEVIPREX) infusion  2 mg/hr Intravenous Continuous PRN Te Foley, ARG[place] - CNP        insulin regular (HUMULIN R;NOVOLIN R) 100 Units in sodium chloride 0.9 % 100 mL infusion  1 Units/hr Intravenous Continuous PRN Te Foley, [place] - CNP        [START ON 12/11/2020] insulin glargine (LANTUS) injection vial 13 Units  0.15 Units/kg Subcutaneous Nightly Te Foley, APRN - CNP        glucose (GLUTOSE) 40 % oral gel 15 g  15 g Oral PRN Te Foley, ARGENT[place] - CNP        dextrose 50 % IV solution  12.5 g Intravenous PRN Te Foley, APRN - CNP        glucagon (rDNA) injection 1 mg  1 mg Intramuscular PRN Te Foley, AR[place] - CNP        dextrose 5 % solution  100 mL/hr Intravenous PRN Te Foley, APRN - CNP        aluminum & magnesium hydroxide-simethicone (MAALOX) 800-477-44 MG/5ML suspension 30 mL  30 mL Per NG tube Q4H PRN Te Foley, ARG[place] - CNP        calcium gluconate 1 g in dextrose 5 % 100 mL IVPB  1 g Intravenous PRN Te Foley, APRN - CNP        [START ON 12/11/2020] clopidogrel (PLAVIX) tablet 75 mg  75 mg Oral Daily Te Foley APRN - CNP        insulin lispro (HUMALOG) injection vial 0-3 Units  0-3 Units Subcutaneous Q4H Te Foley APRN - CNP        [START ON 12/11/2020] insulin lispro (HUMALOG) injection vial 0-18 Units  0-18 Units Subcutaneous Q4H Te Foley, APRN - CNP        [START ON 12/11/2020] tamsulosin (FLOMAX) capsule 0.4 mg  0.4 mg Oral Daily Te Foley, [place] - CNP        propofol 1000 MG/100ML injection             albumin human 5 % IV solution             albumin human 5 % IV solution 25 g  25 g Intravenous Once German Joseph APRN - CNP          sodium chloride      propofol      sodium chloride      norepinephrine      clevidipine      insulin      dextrose         Physical Exam:  /64   Pulse 83   Temp 97.6 °F (36.4 °C) (Temporal)   Resp 18   Ht 5' 10\" (1.778 m)   Wt 196 lb 9.6 oz (89.2 kg)   SpO2 100%   BMI 28.21 kg/m²   Weight change: Wt Readings from Last 3 Encounters:   12/08/20 196 lb 9.6 oz (89.2 kg)     The patient is intubated and mechanically ventilated and in no discomfort or distress. No gross musculoskeletal deformity is present. No significant skin or nail changes are present. Gross examination of head, eyes, nose and throat are negative. Jugular venous pressure is normal and no carotid bruits are present. Normal respiratory effort is noted with no accessory muscle usage present. Lung fields are clear to ascultation. Cardiac examination is notable for a regular rate and rhythm with no palpable thrill. No gallop rhythm or cardiac murmur are identified. A benign abdominal examination is present with no masses or organomegaly. Intact pulses are present throughout all extremities and no peripheral edema is present. No focal neurologic deficits are present. Intake/Output:    Intake/Output Summary (Last 24 hours) at 12/10/2020 1547  Last data filed at 12/10/2020 1500  Gross per 24 hour   Intake 1050 ml   Output 2120 ml   Net -1070 ml     No intake/output data recorded. Laboratory Tests:  Lab Results   Component Value Date    CREATININE 0.9 12/10/2020    BUN 23 12/10/2020     12/10/2020    K 4.4 12/10/2020     12/10/2020    CO2 21 (L) 12/10/2020     No results for input(s): CKTOTAL, CKMB in the last 72 hours.     Invalid input(s): TROPONONI  No results found for: BNP  Lab Results   Component Value Date    WBC 16.5 12/10/2020    RBC 3.05 12/10/2020    HGB 9.3 12/10/2020    HCT 29.0 12/10/2020    MCV 95.1 12/10/2020    MCH 30.5 12/10/2020    MCHC 32.1 12/10/2020    RDW 12.8 12/10/2020     12/10/2020    MPV 10.4 12/10/2020     Recent Labs 12/08/20  1013   ALKPHOS 158*   ALT 27   AST 20   PROT 7.4   BILITOT 0.7   LABALBU 3.7     No results found for: MG  Lab Results   Component Value Date    PROTIME 17.6 12/10/2020    INR 1.5 12/10/2020     No results found for: TSH  No components found for: CHLPL  Lab Results   Component Value Date    TRIG 85 12/07/2020     Lab Results   Component Value Date    HDL 44 12/07/2020     Lab Results   Component Value Date    LDLCALC 104 (H) 12/07/2020       Cardiac Tests:  Telemetry findings reviewed: sinus rhythm, no new tachy/bradyarrhythmias overnight  Chest X-ray: A chest x-ray reviewed at the time of evaluation demonstrates no evidence of cardiomegaly and present evidence of atelectasis      ASSESSMENT / PLAN: On a clinical basis, the patient appears stable from a cardiovascular standpoint following surgical revascularization with assessment immediately postoperatively with persistent sedation and mechanical ventilation with plans of weaning as tolerated. Additional management will be deferred to the cardiothoracic surgical service with plans to resume beta-blocker therapy in the face of his non-ST elevation myocardial infarction appropriate lipid management within the initial postoperative period. Note: This report was completed utilizing computer voice recognition software. Every effort has been made to ensure accuracy, however; inadvertent computerized transcription errors may be present. Saroj Sharma.  Alexis Overton, [id number] [facility]

## 2020-12-10 NOTE — PROGRESS NOTES
Spontaneous Parameters performed    VT = 467ml  f = 23  B/M  Ve = 8.75 L/M  NIF = -29  cmH2O  VC = .857 m L  RSBI = 55      Performed by [facility]

## 2020-12-10 NOTE — ANESTHESIA PRE PROCEDURE
Department of Anesthesiology  Preprocedure Note       Name:  Luis Mason   Age:  70 y.o.  :  1945                                          MRN:  [id number]         Date:  12/10/2020      Surgeon: Irasema Sims):  Johanne Montes DO    Procedure: Procedure(s):  CORONARY ARTERY BYPASS, BELINDA    Medications prior to admission:   Prior to Admission medications    Medication Sig Start Date End Date Taking?  Authorizing Provider   amLODIPine (NORVASC) 5 MG tablet Take 5 mg by mouth daily   Yes Historical Provider, MD       Current medications:    Current Facility-Administered Medications   Medication Dose Route Frequency Provider Last Rate Last Dose    mupirocin (BACTROBAN) 2 % ointment   Nasal BID EDUARDO Rod        ceFAZolin (ANCEF) 2 g in sterile water 20 mL IV syringe  2 g Intravenous See Admin Instructions EDUARDO Rod        chlorhexidine (HIBICLENS) 4 % liquid   Topical See Admin Instructions EDUARDO Rod        cefTRIAXone (ROCEPHIN) 1 g in sterile water 10 mL IV syringe  1 g Intravenous Q24H Madhavi Hamilton DO   1 g at 20 1737    atorvastatin (LIPITOR) tablet 40 mg  40 mg Oral Nightly Marisela Rivers MD   40 mg at 20 2109    acetaminophen (TYLENOL) suppository 650 mg  650 mg Rectal Q6H PRN Reubin Hamper, DO        Or    acetaminophen (TYLENOL) tablet 650 mg  650 mg Oral Q6H PRN Reubin Hamper, DO        aspirin chewable tablet 324 mg  324 mg Oral Once Kriss Colbert MD        sodium chloride flush 0.9 % injection 10 mL  10 mL Intravenous 2 times per day Mateo Nobles MD   10 mL at 200    sodium chloride flush 0.9 % injection 10 mL  10 mL Intravenous PRN Mateo Nobles MD        polyethylene glycol (GLYCOLAX) packet 17 g  17 g Oral Daily PRN Mateo Nobles MD        promethazine (PHENERGAN) tablet 12.5 mg  12.5 mg Oral Q6H PRN Mateo Nobles MD        Or    ondansetron (ZOFRAN) injection 4 mg  4 mg Intravenous Q6H PRN Willian Wheatley MD        nitroGLYCERIN (NITROSTAT) SL tablet 0.4 mg  0.4 mg Sublingual Q5 Min PRN Cole Weir MD        aspirin chewable tablet 81 mg  81 mg Oral Daily Cole Weir MD   81 mg at 12/09/20 1015       Allergies:  No Known Allergies    Problem List:    Patient Active Problem List   Diagnosis Code    Chest pain R07.9    Anemia D64.9    Leukocytosis D72.829    HTN (hypertension) I10    History of rectal cancer Z85.048       Past Medical History:  History reviewed. No pertinent past medical history. Past Surgical History:  History reviewed. No pertinent surgical history. Social History:    Social History     Tobacco Use    Smoking status: Former Smoker    Smokeless tobacco: Never Used   Substance Use Topics    Alcohol use: Not Currently                                Counseling given: Not Answered      Vital Signs (Current):   Vitals:    12/09/20 1012 12/09/20 1730 12/09/20 2203 12/10/20 0521   BP: 109/61 (!) 118/57 120/60 110/60   Pulse:  70 72 77   Resp:   18 16   Temp:       TempSrc:       SpO2:   96%    Weight:       Height:                                                  BP Readings from Last 3 Encounters:   12/10/20 110/60       NPO Status: Time of last liquid consumption: 2355                        Time of last solid consumption: 2355                        Date of last liquid consumption: 12/09/20                        Date of last solid food consumption: 12/09/20    BMI:   Wt Readings from Last 3 Encounters:   12/08/20 196 lb 9.6 oz (89.2 kg)     Body mass index is 28.21 kg/m².     CBC:   Lab Results   Component Value Date    WBC 10.3 12/10/2020    RBC 3.82 12/10/2020    HGB 11.7 12/10/2020    HCT 36.1 12/10/2020    MCV 94.5 12/10/2020    RDW 12.8 12/10/2020     12/10/2020       CMP:   Lab Results   Component Value Date     12/10/2020    K 3.9 12/10/2020    K 3.9 12/07/2020     12/10/2020    CO2 21 12/10/2020    BUN 23 12/10/2020    CREATININE 0.9 12/10/2020 GFRAA >60 12/10/2020    LABGLOM >60 12/10/2020    GLUCOSE 91 12/10/2020    PROT 7.4 12/08/2020    CALCIUM 9.4 12/10/2020    BILITOT 0.7 12/08/2020    ALKPHOS 158 12/08/2020    AST 20 12/08/2020    ALT 27 12/08/2020       POC Tests: No results for input(s): POCGLU, POCNA, POCK, POCCL, POCBUN, POCHEMO, POCHCT in the last 72 hours. Coags:   Lab Results   Component Value Date    PROTIME 16.1 12/09/2020    INR 1.4 12/09/2020    APTT 27.3 12/09/2020       HCG (If Applicable): No results found for: PREGTESTUR, PREGSERUM, HCG, HCGQUANT     ABGs: No results found for: PHART, PO2ART, WMD5XUI, WVD3XUX, BEART, S3QUHGOP     Type & Screen (If Applicable):  No results found for: LABABO, LABRH    Drug/Infectious Status (If Applicable):  No results found for: HIV, HEPCAB    COVID-19 Screening (If Applicable):   Lab Results   Component Value Date    COVID19 Not Detected 12/07/2020               CXR 12/6/20  FINDINGS:    The lungs are without acute focal process.  There is no effusion or    pneumothorax. The cardiomediastinal silhouette is without acute process. The    osseous structures are without acute process.         Impression    No acute process.                     CT CHEST 12/8/20  FINDINGS:    The heart and the great vessels are normal.  There is coronary artery    calcification and tiny pericardial effusion.  The ascending thoracic aorta is    mildly ectatic measuring 3.5 x 3.3 cm without significant calcification. There is some punctate calcifications in the aortic arch and descending    thoracic aorta which measures 2.6 cm.  The trachea and major bronchi are    patent.  There is mild COPD with bronchitis.  Lungs are free of acute    infiltrate or pleural effusion.  The liver is of normal architecture.     Degenerative changes are identified in the thoracic spine with mild    compression deformity of T8         Impression    Coronary artery calcification without significant calcification or aneurysm    of the ascending thoracic aorta. Vika Scrivener is no focal consolidation.                     CAROTID US 12/8/20  Impression    Atherosclerotic disease. No hemodynamically significant stenosis is    identified    Estimated stenosis by NASCET criteria in the proximal right carotid    artery is between 0% and 49%. Estimated stenosis by NASCET criteria in the proximal left carotid    artery is between 0% and 49%. CARDIAC CATH 12/7/20  Hemodynamics:   Opening Aortic pressure: 669/46   LV systolic pressure: 710   LVEDP: 19   No significant gradient across the aortic valve   FFR of the left main: 0.44     Angiographic Results/findings:   Left Main: Tortuous.  Distal 50%.  FFR 0.44   LAD: Minimal diffuse luminal irregularities. D1: Ostial 80%   D2: No angiographically significant stenosis. Cx: Mid diffuse 70%   OM1: Ostial chronic total occlusion with right to left   collaterals. RCA: Dominant.  Proximal eccentric 75%.  Proximal to mid diffuse   70%.  Mid eccentric 50%.  Proximal diffuse 30%. PDA: Proximal diffuse 30%.     PLB: Proximal to mid 80%. LV: Normal LV size and systolic function.  No wall motion   abnormalities.  Ejection fraction 60%             ECHO 12/7/20  EF 65%   Findings      Left Ventricle   Left ventricular internal dimensions were normal in diastole and systole. Borderline concentric left ventricular hypertrophy. No regional wall motion abnormalities seen. Normal left ventricular ejection fraction. Ejection fraction is visually estimated at 65%. Indeterminate diastolic function. Right Ventricle   Normal right ventricular size and function. Left Atrium   Normal sized left atrium. Interatrial septum appears intact. Right Atrium   Normal right atrium size. Mitral Valve   Structurally normal mitral valve. Physiologic and/or trace mitral regurgitation is present.       Tricuspid Valve   The tricuspid valve appears structurally normal.      Aortic Valve   The aortic valve appears mildly sclerotic. Pulmonic Valve   The pulmonic valve was not well visualized. Pericardial Effusion   No evidence of pericardial effusion. Aorta   Aortic root dimension within normal limits. Conclusions      Summary   Left ventricular internal dimensions were normal in diastole and systole. Borderline concentric left ventricular hypertrophy. No regional wall motion abnormalities seen. Normal left ventricular ejection fraction. Physiologic and/or trace mitral regurgitation is present. The aortic valve appears mildly sclerotic. EKG 12/8/20  Narrative & Impression     Normal sinus rhythm  Nonspecific T wave abnormality  Abnormal ECG  When compared with ECG of 07-DEC-2020 06:27,  Significant changes have occurred             Anesthesia Evaluation  Patient summary reviewed and Nursing notes reviewed no history of anesthetic complications:   Airway: Mallampati: III  TM distance: <3 FB   Neck ROM: full  Mouth opening: > = 3 FB Dental:    (+) edentulous      Pulmonary: breath sounds clear to auscultation      (-) shortness of breath and not a current smoker (former smoker. quit in 2004)          Patient did not smoke on day of surgery. Cardiovascular:  Exercise tolerance: good (>4 METS),   (+) hypertension:, valvular problems/murmurs: AS, angina (last episode on 12/6/20):, past MI: < 1 month,     (-)  ZAMORA    ECG reviewed  Rhythm: regular  Rate: normal  Echocardiogram reviewed  Stress test reviewed  Cleared by cardiology     Beta Blocker:  Dose within 24 Hrs         Neuro/Psych:   Negative Neuro/Psych ROS              GI/Hepatic/Renal:        (-) GERD       Endo/Other:    (+) blood dyscrasia (leukocytosis): anemia:., malignancy/cancer (rectal CA 2008). Abdominal:           Vascular:   + PVD, aortic or cerebral (atherosclerosis), .                                Anesthesia Plan      general     ASA 4       Induction: intravenous. BELINDA, CVP, central line, arterial line and BIS  MIPS: Postoperative opioids intended, Prophylactic antiemetics administered, Postoperative trial extubation and Postoperative ventilation. Anesthetic plan and risks discussed with patient and spouse. Use of blood products discussed with patient whom consented to blood products. Plan discussed with CRNA and attending.                   Kyle Nicholson RN   12/10/2020

## 2020-12-10 NOTE — BRIEF OP NOTE
Brief Postoperative Note      Patient: Alo Emerson  YOB: 1945  MRN: [id number]    Date of Procedure: 12/10/2020    Pre-Op Diagnosis: CAD    Post-Op Diagnosis: Same       Procedure(s):  CABG x3 (LIMA-LAD, SVG-OM1, SVG-PDA)  EV    Surgeon(s):  Lincoln Fleming DO    Assistant:  First Assistant: Jeffery Ramos  Physician Assistant: EDUARDO Galdamez; ARGENTINA Ponce - CNP    Anesthesia: General    Estimated Blood Loss (mL): less than 50     Complications: None    Specimens:   * No specimens in log *    Implants:  * No implants in log *      Drains:   Chest Tube 1 Anterior Mediastinal 19 Welsh (Active)       Chest Tube 2 Anterior Mediastinal 19 Welsh (Active)       Chest Tube 3 Anterior Pleural 19 Welsh (Active)       Urethral Catheter Temperature probe 16 fr (Active)         Electronically signed by Lincoln Fleming DO on 12/10/2020 at 2:33 PM

## 2020-12-10 NOTE — ANESTHESIA PROCEDURE NOTES
Arterial Line:    An arterial line was placed using surface landmarks, in the holding area for the following indication(s): continuous blood pressure monitoring and blood sampling needed. A 20 gauge (size), 10 cm (length), Arrow (type) catheter was placed, Seldinger technique used, into the right brachial artery, secured by tape and Tegaderm. Anesthesia type: Local  Local infiltration: Injection    Events:  patient tolerated procedure well with no complications. 12/10/2020 9:55 AM12/10/2020 10:00 AM  Anesthesiologist: Ines Little MD  Resident/CRNA: ARGENTINA Barksdale CRNA  Other anesthesia staff: Lili Benítez RN  Performed:  Other anesthesia staff   Preanesthetic Checklist  Completed: patient identified, site marked, surgical consent, pre-op evaluation, timeout performed, IV checked, risks and benefits discussed, monitors and equipment checked, anesthesia consent given, oxygen available and patient being monitored

## 2020-12-10 NOTE — CONSULTS
[address] [place] [address] [facility]    Consultation Note     Admit Date: 12/6/2020 10:08 PM    Reason for Consult:   Leukocytosis and UTI with group B strep    Attending Physician:  Bhavin Hathaway DO     Chief Complaint: Chest pain    HISTORY OF PRESENT ILLNESS:   The patient is a 70 y.o.  man that was admitted on 12/6/2020 with chest pain. He was pressure in nature and typical cardiac pain. He has some diaphoresis and was so dizzy he was worked up by cardiology and diagnosed with severe coronary disease and taken to surgery not known to the Infectious Diseases service. The patient is now in CVICU postop with a leukocytosis and a urine culture with group B strep.   In addition to the routine cefazolin perioperative prophylaxis he was given a dose of gentamicin in the OR    Past Medical History:    Colorectal cancer  Chemo and radiation  Hypertension  COPD  Coronary artery disease    Past Surgical History:    Colectomy  Current Medications:   Scheduled Meds:   [START ON 12/11/2020] atorvastatin  40 mg Oral Nightly    sodium chloride flush  10 mL Intravenous 2 times per day    sennosides-docusate sodium  1 tablet Oral BID    [START ON 12/11/2020] pantoprazole  40 mg Oral Daily    pantoprazole  40 mg Intravenous Daily    And    sodium chloride (PF)  10 mL Intravenous Daily    chlorhexidine  15 mL Mouth/Throat BID    [START ON 12/11/2020] magnesium oxide  400 mg Oral Daily    mupirocin   Nasal BID    [START ON 12/11/2020] aspirin  81 mg Oral Daily    aspirin  300 mg Rectal Once    ipratropium-albuterol  1 ampule Inhalation Q4H WA    [START ON 12/11/2020] insulin glargine  0.15 Units/kg Subcutaneous Nightly    [START ON 12/11/2020] clopidogrel  75 mg Oral Daily    insulin lispro  0-3 Units Subcutaneous Q4H    [START ON 12/11/2020] insulin lispro  0-18 Units Subcutaneous Q4H    [START ON 12/11/2020] tamsulosin  0.4 mg Oral Daily    propofol        albumin human        ceFAZolin  2 g Intravenous Q8H     Continuous Infusions:   sodium chloride 30 mL/hr (12/10/20 1500)    propofol 10 mcg/kg/min (12/10/20 1455)    sodium chloride      norepinephrine 4 mcg/min (12/10/20 1500)    clevidipine      insulin      dextrose       PRN Meds:sodium chloride flush, potassium chloride, magnesium sulfate, acetaminophen, acetaminophen, oxyCODONE **OR** oxyCODONE, fentanNYL **OR** fentanNYL, magnesium hydroxide, ondansetron, propofol, meperidine, albumin human, sodium chloride, norepinephrine, clevidipine, insulin, glucose, dextrose, glucagon (rDNA), dextrose, aluminum & magnesium hydroxide-simethicone, calcium gluconate IVPB    Allergies:  Patient has no known allergies.     Social History:   Social History     Socioeconomic History    Marital status:      Spouse name: None    Number of children: None    Years of education: None    Highest education level: None   Occupational History    None   Social Needs    Financial resource strain: None    Food insecurity     Worry: None     Inability: None    Transportation needs     Medical: None     Non-medical: None   Tobacco Use    Smoking status: Former Smoker    Smokeless tobacco: Never Used   Substance and Sexual Activity    Alcohol use: Not Currently    Drug use: Never    Sexual activity: Not Currently   Lifestyle    Physical activity     Days per week: None     Minutes per session: None    Stress: None   Relationships    Social connections     Talks on phone: None     Gets together: None     Attends [employer] service: None     Active member of club or organization: None     Attends meetings of clubs or organizations: None     Relationship status: None    Intimate partner violence     Fear of current or ex partner: None     Emotionally abused: None     Physically abused: None     Forced sexual activity: None   Other Topics Concern    None   Social History Narrative    None     Tobacco: No-reformed  Alcohol: No  Pets: hepatosplenomegaly. Genitourinary: Estrada  Extremities: No clubbing, no cyanosis, no edema. Musculoskeletal: Equal and symmetrical  Neurological: Unable to assess at this time  Lines: peripheral  Art line 12/10/2020  Introducer 12/10/2020  Double-lumen right jugular 12/10/2020  CBC+dif:  Recent Labs     12/09/20  0557 12/10/20  0418 12/10/20  1458   WBC 13.0* 10.3 16.5*   HGB 11.5* 11.7* 9.3*   HCT 35.5* 36.1* 29.0*   MCV 94.4 94.5 95.1    354 236     No results found for: CRP  No results found for: CRPHS  No results found for: SEDRATE  Lab Results   Component Value Date    ALT 27 12/08/2020    AST 20 12/08/2020    ALKPHOS 158 (H) 12/08/2020    BILITOT 0.7 12/08/2020     Lab Results   Component Value Date     12/10/2020    K 4.2 12/10/2020    K 3.9 12/07/2020     12/10/2020    CO2 23 12/10/2020    BUN 23 12/10/2020    CREATININE 0.9 12/10/2020    GFRAA >60 12/10/2020    LABGLOM >60 12/10/2020    GLUCOSE 165 12/10/2020    PROT 7.4 12/08/2020    LABALBU 3.7 12/08/2020    CALCIUM 7.9 12/10/2020    BILITOT 0.7 12/08/2020    ALKPHOS 158 12/08/2020    AST 20 12/08/2020    ALT 27 12/08/2020       Lab Results   Component Value Date    PROTIME 17.6 12/10/2020    INR 1.5 12/10/2020       No results found for: TSH    Lab Results   Component Value Date    COLORU Yellow 12/08/2020    PHUR 6.0 12/08/2020    WBCUA PACKED 12/08/2020    RBCUA 1-3 12/08/2020    BACTERIA MANY 12/08/2020    CLARITYU CLOUDY 12/08/2020    SPECGRAV 1.025 12/08/2020    LEUKOCYTESUR MODERATE 12/08/2020    UROBILINOGEN 0.2 12/08/2020    BILIRUBINUR Negative 12/08/2020    BLOODU MODERATE 12/08/2020    GLUCOSEU Negative 12/08/2020    AMORPHOUS MANY 12/08/2020       Lab Results   Component Value Date    XKQ3AOT 23.2 12/10/2020    WUP6FIL 38.5 12/10/2020    PO2ART 228.8 12/10/2020     Radiology:  XR CHEST PORTABLE   Final Result   Stable postoperative chest with minimal atelectasis in the left upper lobe   and left base.       CT CHEST WO CONTRAST   Final Result   Coronary artery calcification without significant calcification or aneurysm   of the ascending thoracic aorta. There is no focal consolidation. VL CARON BILATERAL LIMITED 1-2 LEVELS   Final Result      US DUP LOWER EXTREMITY MAPPING BILAT VENOUS   Final Result   Bilateral venous mapping as described. No greater   saphenous vein venous thrombosis visualized. US CAROTID ARTERY BILATERAL   Final Result   Atherosclerotic disease. No hemodynamically significant stenosis is   identified   Estimated stenosis by NASCET criteria in the proximal right carotid   artery is between 0% and 49%. Estimated stenosis by NASCET criteria in the proximal left carotid   artery is between 0% and 49%. XR CHEST PORTABLE   Final Result   No acute process. XR CHEST PORTABLE    (Results Pending)       Microbiology:  Pending  No results for input(s): BC in the last 72 hours. Recent Labs     12/08/20  1340   ORG Strep agalactiae (Beta Strep Group B)*     No results for input(s): BLOODCULT2 in the last 72 hours. No results for input(s): STREPNEUMAGU in the last 72 hours. No results for input(s): LP1UAG in the last 72 hours. No results for input(s): ASO in the last 72 hours. No results for input(s): CULTRESP in the last 72 hours. Assessment:  · Leukocytosis postop- most likely reactive  · Group B strep UTI possibly contributing to leukocytosis    Plan:    · Cont cefazolin  · Check cultures  · Baseline ESR, CRP  · Monitor labs  · Will follow with you    Thank you for having us see this patient in consultation. I will be discussing this case with the treating physicians.       Electronically signed by Rg Shelton MD on 12/10/2020 at 4:17 PM

## 2020-12-10 NOTE — FLOWSHEET NOTE
Awake,alert and following commands. Vss on levo.gtt. Extubated,on nc 4liters. Chest tube drainage minimal.Released from anesthesia.

## 2020-12-10 NOTE — ANESTHESIA PROCEDURE NOTES
Central Venous Line:    A central venous line was placed using ultrasound guidance, in the holding area for the following indication(s): central venous access and CVP monitoring. Sterility preparation included the following: hand hygiene performed prior to procedure, maximum sterile barriers used and sterile technique used to drape from head to toe. The patient was placed in supine position. The right internal jugular vein was prepped. The site was prepped with Chloraprep. A 8.5 Fr (size), introducer slick was placed. During the procedure, the following specific steps were taken: target vein identified, needle advanced into vein and blood aspirated and guidewire advanced into vein. Intravenous verification was obtained by ultrasound and venous blood return. Post insertion care included: all ports aspirated, all ports flushed easily, guidewire removed intact, Biopatch applied, line sutured in place and dressing applied. During the procedure the patient experienced: patient tolerated procedure well with no complications.       Insertion site scrubbed per usage guidelines?: Yes  Skin prep agent dried for 3 minutes prior to procedure?:yes  Anesthesia type: local..No  Staffing  Anesthesiologist: Camille Sanz MD  Performed: Anesthesiologist   Preanesthetic Checklist  Completed: patient identified, site marked, surgical consent, pre-op evaluation, timeout performed, IV checked, risks and benefits discussed, monitors and equipment checked, anesthesia consent given, oxygen available and patient being monitored

## 2020-12-10 NOTE — PROGRESS NOTES
hematochezia, melena, acholic stools  Genito-Urinary:  Dysuria, urgency, frequency, hematuria  Musculoskeletal:  Joint pain, joint stiffness, joint swelling, muscle painback pain  Neurology:  Headache, focal neurological deficits, weakness, numbness, paresthesia   Derm:  Rashes, ulcers, excoriations, bruising  Extremities:  Decreased ROM, peripheral edema, mottling      OBJECTIVE:    /64   Pulse 70   Temp 98 °F (36.7 °C) (Oral)   Resp 21   Ht 5' 10\" (1.778 m)   Wt 196 lb 9.6 oz (89.2 kg)   SpO2 100%   BMI 28.21 kg/m²     General appearance:  awake, alert, and oriented to person, place, time, and purpose; appears stated age and cooperative; no apparent distress no labored breathing  HEENT:  Conjunctivae/corneas clear. Neck: Supple. No jugular venous distention. Respiratory: symmetrical; clear to auscultation bilaterally; no wheezes; no rhonchi; no rales  Cardiovascular: rhythm regular; rate controlled; no murmurs  Abdomen: Soft, nontender, nondistended  Extremities:  peripheral pulses present; no peripheral edema; no ulcers  Musculoskeletal: No clubbing, cyanosis, no bilateral lower extremity edema. Brisk capillary refill. Skin:  No rashes  on visible skin  Neurologic: awake, alert and following commands     ASSESSMENT and PLAN:  · NSTEMI- Aspirin, beta-blocker, statin. Echocardiogram 12/6 reviewed. Underwent cardiac catheterization 12/7 showing multivessel disease. CTS consulted, underwent CABG workup. Plan is for CABG tomorrow. · Leukocytosis, follow CBC. · UTI with group B strep- Follow culture, had been on rocephin while awaiting cultures. Then transitioned to ancef by ID. · Hypertension, holding meds for now due to hypotension post surgery  · Anemia, monitor hemoglobin  · History of colorectal cancer status post resection, chemotherapy and radiation.   · COPD          DISPOSITION: Continue current plan of care    Medications:  REVIEWED DAILY    Infusion Medications    sodium chloride 30 mL/hr (12/10/20 1500)    propofol Stopped (12/10/20 1550)    sodium chloride      norepinephrine 2 mcg/min (12/10/20 1630)    clevidipine      insulin      dextrose       Scheduled Medications    [START ON 12/11/2020] atorvastatin  40 mg Oral Nightly    sodium chloride flush  10 mL Intravenous 2 times per day    sennosides-docusate sodium  1 tablet Oral BID    [START ON 12/11/2020] pantoprazole  40 mg Oral Daily    chlorhexidine  15 mL Mouth/Throat BID    [START ON 12/11/2020] magnesium oxide  400 mg Oral Daily    mupirocin   Nasal BID    [START ON 12/11/2020] aspirin  81 mg Oral Daily    ipratropium-albuterol  1 ampule Inhalation Q4H WA    [START ON 12/11/2020] insulin glargine  0.15 Units/kg Subcutaneous Nightly    [START ON 12/11/2020] clopidogrel  75 mg Oral Daily    insulin lispro  0-3 Units Subcutaneous Q4H    [START ON 12/11/2020] insulin lispro  0-18 Units Subcutaneous Q4H    [START ON 12/11/2020] tamsulosin  0.4 mg Oral Daily    propofol        albumin human        ceFAZolin  2 g Intravenous Q8H     PRN Meds: sodium chloride flush, potassium chloride, magnesium sulfate, acetaminophen, acetaminophen, oxyCODONE **OR** oxyCODONE, fentanNYL **OR** fentanNYL, magnesium hydroxide, ondansetron, propofol, meperidine, albumin human, sodium chloride, norepinephrine, clevidipine, insulin, glucose, dextrose, glucagon (rDNA), dextrose, aluminum & magnesium hydroxide-simethicone, calcium gluconate IVPB    Labs:     Recent Labs     12/09/20  0557 12/10/20  0418 12/10/20  1458   WBC 13.0* 10.3 16.5*   HGB 11.5* 11.7* 9.3*   HCT 35.5* 36.1* 29.0*    354 236       Recent Labs     12/09/20  0557 12/10/20  0418  12/10/20  1307 12/10/20  1413 12/10/20  1458    141  --   --   --  136   K 4.0 3.9   < > 5.0 4.4 4.2    107  --   --   --  103   CO2 24 21*  --   --   --  23   BUN 20 23  --   --   --  23   CREATININE 1.0 0.9  --   --   --  0.9   CALCIUM 9.2 9.4  --   --   --  7.9*    < > =

## 2020-12-10 NOTE — PROGRESS NOTES
CVICU Admission Note    Name: Kath Garcia  MRN: [id number]    CC: Postoperative Critical Care Management     Indication for Surgery/Procedure: CAD  LVEF:  Normal    RVF:  Normal    Important/Relevant PMH/PSH: HTN, rectal cancer 2008, former smoker     Procedure/Surgeries: 12/10/2020 CABG x3 (LIMA-LAD, SVG-OM1, SVG-PDA), EVH    Pacing wires: Ventricular      Physical Exam:    /64   Pulse 78   Temp 98.2 °F (36.8 °C) (Oral)   Resp 16   Ht 5' 10\" (1.778 m)   Wt 196 lb 9.6 oz (89.2 kg)   SpO2 97%   BMI 28.21 kg/m²     Recent Labs     12/10/20  0418   WBC 10.3   RBC 3.82   HGB 11.7*   HCT 36.1*   MCV 94.5   MCH 30.6   MCHC 32.4   RDW 12.8      MPV 10.5     Recent Labs     12/10/20  0418  12/10/20  1413     --   --    K 3.9   < > 4.4     --   --    CO2 21*  --   --    BUN 23  --   --    CREATININE 0.9  --   --    GLUCOSE 91  --   --    CALCIUM 9.4  --   --     < > = values in this interval not displayed. Post operative CXR:  Atelectasis, No pneumothorax noted, Mildly increased vascular markings bilateral, No significant pleural effusion. ETT/lines appear to be in proper position. RN to advance OG. Final Radiology report pending. General Appearance: Arrived to ICU intubated, sedated. MAP ~55-60, albumin ordered and starting levophed. Hematuria noted. Eyes: PERRL  Pulmonary: Diminished bibasilar. No wheezes, no accessory muscle use noted   Ventilator: Mode: AC/VC, 60% FiO2, 5 PEEP, 550 Vt   Cardiovascular: RRR, no heaves or thrills palpated   Telemetry: SB, Sinus arrhythmia   Abdomen: Soft, OG to LIWS   Extremities: BLE +DP/PT signals, no edema   Neurologic/Psych: Sedation  Skin: Warm and dry    Incision: MSI with GIULIANA intact, RLE SVG sites with ace wrap        Assessment/Plan: Day of Surgery     1. CAD S/p CABG x3 (LIMA-LAD, SVG-OM1, SVG-PDA)  - DAPT, Lipitor  - Ancef   - Monitor chest tube output and hemodynamics closely    2.  Acute Postoperative Respiratory Insufficiency  - 2/2 surgery  - Intubated on ventilator  - Wean vent settings and extubate patient once awake, following commands, ARMAS, and no signs of bleeding  - ABGs per protocol and PRN     3. Acute Post Operative Pain   - PRN fentanyl for pain management until able to take PO     4. Postoperative hypotension  - Target MAP >65  - IVF resuscitation PRN, start levophed gtt to maintain MAP at goal     5. Hematuria  - Likely 2/2 traumatic insertion   - Arrived to ICU with cherry red hematuria, no clots noted  - Monitor, flush catheter if UOP decreases to assess for clotting    6.  UTI   - Urine culture with Strep agalactiae (Beta Strep Group B), sensitivity to follow   - Preop on Rocephin   - Given Genatmicin intraop, on perioperative ancef  - ID consulted       Electronically signed by ARGENTINA Angeles - CNP on 12/10/2020 at 2:38 PM

## 2020-12-10 NOTE — ANESTHESIA PROCEDURE NOTES
Procedure Performed: BELINDA     Start Time:        End Time:        General Procedure Information  Diagnostic Indications for Echo:  assessment of ascending aorta, hemodynamic monitoring and assessment of valve function  Physician Requesting Echo: Kailash Crooks DO  Location performed:  OR  Intubated  Heart visualized  Probe Type:  3D  Modalities:  Color flow mapping, 2D only, continuous wave Doppler and pulse wave Doppler    Echocardiographic and Doppler Measurements    Ventricles    Right Ventricle:  Cavity size normal.  Hypertrophy not present. Thrombus not present. Left Ventricle:  Cavity size normal.  Hypertrophy not present. Thrombus not present. Ejection Fraction 64%. Ventricular Regional Function:  1- Basal Anteroseptal:  normal  2- Basal Anterior:  normal  3- Basal Anterolateral:  normal  4- Basal Inferolateral:  normal  5- Basal Inferior:  normal  6- Basal Inferoseptal:  normal  7- Mid Anteroseptal:  normal  8- Mid Anterior:  normal  9- Mid Anterolateral:  normal  10- Mid Inferolateral:  normal  11- Mid Inferior:  normal  12- Mid Inferoseptal:  normal  13- Apical Anterior:  normal  15- Apical Inferior:  normal  16- Apical Septal:  normal  17- [place]:  normal      Valves    Aortic Valve: Annulus normal.  Stenosis not present. Regurgitation none. Leaflets normal.  Leaflet motions normal.      Mitral Valve: Annulus normal.  Stenosis not present. Regurgitation none. Leaflets normal.  Leaflet motions normal.      Tricuspid Valve: Annulus normal.  Stenosis not present. Regurgitation mild. Leaflets normal.  Leaflet motions normal.    Pulmonic Valve: Annulus normal.  Stenosis not present. Regurgitation none.           Aorta    Ascending Aorta:  Size normal.    Aortic Arch:  Size normal.    Descending Aorta:  Size normal.          Atria    Right Atrium:  Size normal.    Left Atrium:  Size normal.  Left atrial appendage normal.      Septa    Atrial Septum:  Intra-atrial septal morphology normal. Ventricular Septum:  Intra-ventricular septum morphology normal.      Diastolic Function Measurements:  Diastolic Dysfunction Grade= indeterminate  E= ms  A= ms  E/A Ratio=   DT= ms  S/D=  IVRT=    Other Findings  Pericardium:  normal      Anesthesia Information  Performed Personally  Anesthesiologist:  Angeline Pisano MD      Echocardiogram Comments:       S/P CABG 3 vessels, preserved LV and RV systolic function, report given to Dr. Kelley Burt intraoperatively.

## 2020-12-10 NOTE — PROGRESS NOTES
Order to extubate patient. Patient suctioned, cuff deflated, and ETT removed. Patient placed on a NC 4 LPM, SPO2 98%. No stridor.

## 2020-12-11 ENCOUNTER — APPOINTMENT (OUTPATIENT)
Dept: GENERAL RADIOLOGY | Age: 75
DRG: 234 | End: 2020-12-11
Payer: MEDICARE

## 2020-12-11 LAB
ANION GAP SERPL CALCULATED.3IONS-SCNC: 9 MMOL/L (ref 7–16)
BUN BLDV-MCNC: 19 MG/DL (ref 8–23)
CALCIUM SERPL-MCNC: 8.5 MG/DL (ref 8.6–10.2)
CHLORIDE BLD-SCNC: 109 MMOL/L (ref 98–107)
CO2: 24 MMOL/L (ref 22–29)
CREAT SERPL-MCNC: 0.8 MG/DL (ref 0.7–1.2)
GFR AFRICAN AMERICAN: >60
GFR NON-AFRICAN AMERICAN: >60 ML/MIN/1.73
GLUCOSE BLD-MCNC: 133 MG/DL (ref 74–99)
HCT VFR BLD CALC: 27.1 % (ref 37–54)
HEMOGLOBIN: 8.8 G/DL (ref 12.5–16.5)
MCH RBC QN AUTO: 31.2 PG (ref 26–35)
MCHC RBC AUTO-ENTMCNC: 32.5 % (ref 32–34.5)
MCV RBC AUTO: 96.1 FL (ref 80–99.9)
METER GLUCOSE: 122 MG/DL (ref 74–99)
METER GLUCOSE: 135 MG/DL (ref 74–99)
METER GLUCOSE: 137 MG/DL (ref 74–99)
METER GLUCOSE: 159 MG/DL (ref 74–99)
METER GLUCOSE: 164 MG/DL (ref 74–99)
METER GLUCOSE: 168 MG/DL (ref 74–99)
ORGANISM: ABNORMAL
PDW BLD-RTO: 12.8 FL (ref 11.5–15)
PLATELET # BLD: 267 E9/L (ref 130–450)
PMV BLD AUTO: 10.8 FL (ref 7–12)
POTASSIUM SERPL-SCNC: 4.1 MMOL/L (ref 3.5–5)
POTASSIUM SERPL-SCNC: 4.4 MMOL/L (ref 3.5–5)
RBC # BLD: 2.82 E12/L (ref 3.8–5.8)
SODIUM BLD-SCNC: 142 MMOL/L (ref 132–146)
URINE CULTURE, ROUTINE: ABNORMAL
URINE CULTURE, ROUTINE: ABNORMAL
WBC # BLD: 10.1 E9/L (ref 4.5–11.5)

## 2020-12-11 PROCEDURE — 2700000000 HC OXYGEN THERAPY PER DAY

## 2020-12-11 PROCEDURE — 97166 OT EVAL MOD COMPLEX 45 MIN: CPT

## 2020-12-11 PROCEDURE — 6360000002 HC RX W HCPCS: Performed by: SPECIALIST

## 2020-12-11 PROCEDURE — 36415 COLL VENOUS BLD VENIPUNCTURE: CPT

## 2020-12-11 PROCEDURE — 6370000000 HC RX 637 (ALT 250 FOR IP): Performed by: NURSE PRACTITIONER

## 2020-12-11 PROCEDURE — 99232 SBSQ HOSP IP/OBS MODERATE 35: CPT | Performed by: INTERNAL MEDICINE

## 2020-12-11 PROCEDURE — 97530 THERAPEUTIC ACTIVITIES: CPT

## 2020-12-11 PROCEDURE — 2580000003 HC RX 258: Performed by: NURSE PRACTITIONER

## 2020-12-11 PROCEDURE — 51701 INSERT BLADDER CATHETER: CPT

## 2020-12-11 PROCEDURE — 97163 PT EVAL HIGH COMPLEX 45 MIN: CPT

## 2020-12-11 PROCEDURE — 80048 BASIC METABOLIC PNL TOTAL CA: CPT

## 2020-12-11 PROCEDURE — 84132 ASSAY OF SERUM POTASSIUM: CPT

## 2020-12-11 PROCEDURE — 85027 COMPLETE CBC AUTOMATED: CPT

## 2020-12-11 PROCEDURE — 71045 X-RAY EXAM CHEST 1 VIEW: CPT

## 2020-12-11 PROCEDURE — 94640 AIRWAY INHALATION TREATMENT: CPT

## 2020-12-11 PROCEDURE — 97535 SELF CARE MNGMENT TRAINING: CPT

## 2020-12-11 PROCEDURE — 51798 US URINE CAPACITY MEASURE: CPT

## 2020-12-11 PROCEDURE — 37799 UNLISTED PX VASCULAR SURGERY: CPT

## 2020-12-11 PROCEDURE — 82962 GLUCOSE BLOOD TEST: CPT

## 2020-12-11 PROCEDURE — 2140000000 HC CCU INTERMEDIATE R&B

## 2020-12-11 PROCEDURE — 6360000002 HC RX W HCPCS: Performed by: NURSE PRACTITIONER

## 2020-12-11 RX ORDER — FOLIC ACID 1 MG/1
1 TABLET ORAL DAILY
Status: DISCONTINUED | OUTPATIENT
Start: 2020-12-12 | End: 2020-12-14 | Stop reason: HOSPADM

## 2020-12-11 RX ORDER — PANTOPRAZOLE SODIUM 40 MG/1
40 TABLET, DELAYED RELEASE ORAL DAILY
Status: DISCONTINUED | OUTPATIENT
Start: 2020-12-12 | End: 2020-12-14 | Stop reason: HOSPADM

## 2020-12-11 RX ORDER — FERROUS SULFATE 325(65) MG
325 TABLET ORAL 2 TIMES DAILY WITH MEALS
Status: DISCONTINUED | OUTPATIENT
Start: 2020-12-12 | End: 2020-12-14 | Stop reason: HOSPADM

## 2020-12-11 RX ORDER — ASPIRIN 81 MG/1
81 TABLET ORAL DAILY
Status: DISCONTINUED | OUTPATIENT
Start: 2020-12-12 | End: 2020-12-14 | Stop reason: HOSPADM

## 2020-12-11 RX ORDER — ACETAMINOPHEN 325 MG/1
650 TABLET ORAL EVERY 4 HOURS PRN
Status: DISCONTINUED | OUTPATIENT
Start: 2020-12-11 | End: 2020-12-14 | Stop reason: HOSPADM

## 2020-12-11 RX ORDER — ONDANSETRON 2 MG/ML
4 INJECTION INTRAMUSCULAR; INTRAVENOUS EVERY 8 HOURS PRN
Status: DISCONTINUED | OUTPATIENT
Start: 2020-12-11 | End: 2020-12-14 | Stop reason: HOSPADM

## 2020-12-11 RX ORDER — POTASSIUM CHLORIDE 20 MEQ/1
20 TABLET, EXTENDED RELEASE ORAL PRN
Status: DISCONTINUED | OUTPATIENT
Start: 2020-12-11 | End: 2020-12-14 | Stop reason: HOSPADM

## 2020-12-11 RX ORDER — ASCORBIC ACID 500 MG
500 TABLET ORAL 2 TIMES DAILY
Status: DISCONTINUED | OUTPATIENT
Start: 2020-12-11 | End: 2020-12-14 | Stop reason: HOSPADM

## 2020-12-11 RX ORDER — HYDROCODONE BITARTRATE AND ACETAMINOPHEN 5; 325 MG/1; MG/1
1 TABLET ORAL EVERY 4 HOURS PRN
Status: DISCONTINUED | OUTPATIENT
Start: 2020-12-11 | End: 2020-12-14 | Stop reason: HOSPADM

## 2020-12-11 RX ORDER — HYDROCODONE BITARTRATE AND ACETAMINOPHEN 5; 325 MG/1; MG/1
2 TABLET ORAL EVERY 4 HOURS PRN
Status: DISCONTINUED | OUTPATIENT
Start: 2020-12-11 | End: 2020-12-14 | Stop reason: HOSPADM

## 2020-12-11 RX ORDER — SENNA AND DOCUSATE SODIUM 50; 8.6 MG/1; MG/1
1 TABLET, FILM COATED ORAL 2 TIMES DAILY
Status: DISCONTINUED | OUTPATIENT
Start: 2020-12-11 | End: 2020-12-14 | Stop reason: HOSPADM

## 2020-12-11 RX ORDER — BISACODYL 10 MG
10 SUPPOSITORY, RECTAL RECTAL DAILY PRN
Status: DISCONTINUED | OUTPATIENT
Start: 2020-12-11 | End: 2020-12-14 | Stop reason: HOSPADM

## 2020-12-11 RX ADMIN — CLOPIDOGREL 75 MG: 75 TABLET, FILM COATED ORAL at 09:27

## 2020-12-11 RX ADMIN — TAMSULOSIN HYDROCHLORIDE 0.4 MG: 0.4 CAPSULE ORAL at 09:27

## 2020-12-11 RX ADMIN — MAGNESIUM GLUCONATE 500 MG ORAL TABLET 400 MG: 500 TABLET ORAL at 09:28

## 2020-12-11 RX ADMIN — MUPIROCIN: 20 OINTMENT TOPICAL at 09:28

## 2020-12-11 RX ADMIN — IPRATROPIUM BROMIDE AND ALBUTEROL SULFATE 1 AMPULE: .5; 3 SOLUTION RESPIRATORY (INHALATION) at 16:07

## 2020-12-11 RX ADMIN — INSULIN LISPRO 3 UNITS: 100 INJECTION, SOLUTION INTRAVENOUS; SUBCUTANEOUS at 13:42

## 2020-12-11 RX ADMIN — DOCUSATE SODIUM 50 MG AND SENNOSIDES 8.6 MG 1 TABLET: 8.6; 5 TABLET, FILM COATED ORAL at 09:27

## 2020-12-11 RX ADMIN — METOPROLOL TARTRATE 12.5 MG: 25 TABLET, FILM COATED ORAL at 21:19

## 2020-12-11 RX ADMIN — OXYCODONE HYDROCHLORIDE 10 MG: 10 TABLET ORAL at 16:34

## 2020-12-11 RX ADMIN — METOPROLOL TARTRATE 12.5 MG: 25 TABLET, FILM COATED ORAL at 09:27

## 2020-12-11 RX ADMIN — SODIUM CHLORIDE, PRESERVATIVE FREE 10 ML: 5 INJECTION INTRAVENOUS at 09:28

## 2020-12-11 RX ADMIN — FENTANYL CITRATE 25 MCG: 50 INJECTION, SOLUTION INTRAMUSCULAR; INTRAVENOUS at 05:03

## 2020-12-11 RX ADMIN — ATORVASTATIN CALCIUM 40 MG: 40 TABLET, FILM COATED ORAL at 21:19

## 2020-12-11 RX ADMIN — FENTANYL CITRATE 50 MCG: 50 INJECTION, SOLUTION INTRAMUSCULAR; INTRAVENOUS at 00:11

## 2020-12-11 RX ADMIN — OXYCODONE HYDROCHLORIDE 10 MG: 10 TABLET ORAL at 11:28

## 2020-12-11 RX ADMIN — FENTANYL CITRATE 50 MCG: 50 INJECTION, SOLUTION INTRAMUSCULAR; INTRAVENOUS at 09:36

## 2020-12-11 RX ADMIN — SODIUM CHLORIDE, PRESERVATIVE FREE 10 ML: 5 INJECTION INTRAVENOUS at 21:20

## 2020-12-11 RX ADMIN — INSULIN LISPRO 1 UNITS: 100 INJECTION, SOLUTION INTRAVENOUS; SUBCUTANEOUS at 21:25

## 2020-12-11 RX ADMIN — OXYCODONE HYDROCHLORIDE 5 MG: 5 TABLET ORAL at 07:29

## 2020-12-11 RX ADMIN — DOCUSATE SODIUM 50 MG AND SENNOSIDES 8.6 MG 1 TABLET: 8.6; 5 TABLET, FILM COATED ORAL at 21:19

## 2020-12-11 RX ADMIN — Medication 2 G: at 18:30

## 2020-12-11 RX ADMIN — Medication 2 G: at 02:11

## 2020-12-11 RX ADMIN — INSULIN LISPRO 3 UNITS: 100 INJECTION, SOLUTION INTRAVENOUS; SUBCUTANEOUS at 17:14

## 2020-12-11 RX ADMIN — IPRATROPIUM BROMIDE AND ALBUTEROL SULFATE 1 AMPULE: .5; 3 SOLUTION RESPIRATORY (INHALATION) at 19:51

## 2020-12-11 RX ADMIN — ASPIRIN 81 MG: 81 TABLET, COATED ORAL at 09:28

## 2020-12-11 RX ADMIN — IPRATROPIUM BROMIDE AND ALBUTEROL SULFATE 1 AMPULE: .5; 3 SOLUTION RESPIRATORY (INHALATION) at 08:06

## 2020-12-11 RX ADMIN — Medication 2 G: at 11:15

## 2020-12-11 RX ADMIN — PANTOPRAZOLE SODIUM 40 MG: 40 TABLET, DELAYED RELEASE ORAL at 09:27

## 2020-12-11 RX ADMIN — MUPIROCIN: 20 OINTMENT TOPICAL at 21:20

## 2020-12-11 ASSESSMENT — PAIN DESCRIPTION - FREQUENCY
FREQUENCY: CONTINUOUS
FREQUENCY: CONTINUOUS

## 2020-12-11 ASSESSMENT — PAIN DESCRIPTION - PROGRESSION
CLINICAL_PROGRESSION: GRADUALLY IMPROVING
CLINICAL_PROGRESSION: NOT CHANGED

## 2020-12-11 ASSESSMENT — PAIN SCALES - GENERAL
PAINLEVEL_OUTOF10: 7
PAINLEVEL_OUTOF10: 8
PAINLEVEL_OUTOF10: 4
PAINLEVEL_OUTOF10: 8
PAINLEVEL_OUTOF10: 5
PAINLEVEL_OUTOF10: 8
PAINLEVEL_OUTOF10: 5

## 2020-12-11 ASSESSMENT — PAIN DESCRIPTION - ONSET
ONSET: ON-GOING
ONSET: ON-GOING

## 2020-12-11 ASSESSMENT — PAIN DESCRIPTION - DESCRIPTORS
DESCRIPTORS: CONSTANT;ACHING;SORE
DESCRIPTORS: CONSTANT;DISCOMFORT

## 2020-12-11 ASSESSMENT — PAIN DESCRIPTION - PAIN TYPE
TYPE: SURGICAL PAIN
TYPE: SURGICAL PAIN;ACUTE PAIN

## 2020-12-11 ASSESSMENT — PAIN DESCRIPTION - LOCATION
LOCATION: CHEST
LOCATION: CHEST;INCISION;GENERALIZED

## 2020-12-11 ASSESSMENT — PAIN - FUNCTIONAL ASSESSMENT: PAIN_FUNCTIONAL_ASSESSMENT: ACTIVITIES ARE NOT PREVENTED

## 2020-12-11 NOTE — PLAN OF CARE
Problem: Pain:  Goal: Pain level will decrease  Description: Pain level will decrease  12/11/2020 0950 by Magda Martinez RN  Outcome: Met This Shift  12/11/2020 0041 by Mohan Baltazar RN  Outcome: Met This Shift     Problem:  Activity Intolerance:  Goal: Able to perform prescribed physical activity  Description: Able to perform prescribed physical activity  12/11/2020 0950 by Magda Martinez RN  Outcome: Met This Shift  12/11/2020 0041 by Mohan Baltazar RN  Outcome: Met This Shift  Goal: Ability to tolerate increased activity will improve  Description: Ability to tolerate increased activity will improve  12/11/2020 0950 by Magda Martinez RN  Outcome: Met This Shift  12/11/2020 0041 by Mohan Baltazar RN  Outcome: Met This Shift     Problem: Cardiac Output - Decreased:  Goal: Cardiac output within specified parameters  Description: Cardiac output within specified parameters  12/11/2020 0950 by Magda Martinez RN  Outcome: Met This Shift  12/11/2020 0041 by Mohan Baltazar RN  Outcome: Met This Shift  Goal: Hemodynamic stability will improve  Description: Hemodynamic stability will improve  12/11/2020 0950 by Magda Martinez RN  Outcome: Met This Shift  12/11/2020 0041 by Mohan Baltazar RN  Outcome: Met This Shift     Problem: Fluid Volume - Imbalance:  Goal: Ability to achieve a balanced intake and output will improve  Description: Ability to achieve a balanced intake and output will improve  12/11/2020 0950 by Magda Martinez RN  Outcome: Met This Shift  12/11/2020 0041 by Mohan Baltazar RN  Outcome: Met This Shift  Goal: Chest tube drainage is within specified parameters  Description: Chest tube drainage is within specified parameters  12/11/2020 0950 by Magda Martinez RN  Outcome: Met This Shift  12/11/2020 0041 by Mohan Baltazar RN  Outcome: Met This Shift     Problem: Gas Exchange - Impaired:  Goal: Levels of oxygenation will improve  Description: Levels of oxygenation will improve  12/11/2020 0950 by Kaushal Davis Roz Louis RN  Outcome: Met This Shift  12/11/2020 0041 by Zander William RN  Outcome: Met This Shift  Goal: Ability to maintain adequate ventilation will improve  Description: Ability to maintain adequate ventilation will improve  12/11/2020 0950 by Sharron Little RN  Outcome: Met This Shift  12/11/2020 0041 by Zander William RN  Outcome: Met This Shift     Problem: Pain:  Goal: Pain level will decrease  Description: Pain level will decrease  12/11/2020 0950 by Sharron Little RN  Outcome: Met This Shift  12/11/2020 0041 by Zander William RN  Outcome: Met This Shift     Problem: Tissue Perfusion - Cardiopulmonary, Altered:  Goal: Absence of angina  Description: Absence of angina  12/11/2020 0950 by Sharron Little RN  Outcome: Met This Shift  12/11/2020 0041 by Zander William RN  Outcome: Met This Shift  Goal: Hemodynamic stability will improve  Description: Hemodynamic stability will improve  12/11/2020 0950 by Sharron Little RN  Outcome: Met This Shift  12/11/2020 0041 by Zander William RN  Outcome: Met This Shift  Goal: Will show no evidence of cardiac arrhythmias  Description: Will show no evidence of cardiac arrhythmias  12/11/2020 0950 by Sharron Little RN  Outcome: Met This Shift  12/11/2020 0041 by Zander William RN  Outcome: Met This Shift     Problem: Pain:  Goal: Control of chronic pain  Description: Control of chronic pain  12/11/2020 0041 by Zander William RN  Outcome: Completed     Problem: Anxiety:  Goal: Level of anxiety will decrease  Description: Level of anxiety will decrease  12/11/2020 0041 by Zander William RN  Outcome: Completed     Problem: Pain:  Goal: Control of chronic pain  Description: Control of chronic pain  12/11/2020 0041 by Zander William RN  Outcome: Completed     Problem: Tobacco Use:  Goal: Will participate in inpatient tobacco-use cessation counseling  Description: Will participate in inpatient tobacco-use cessation counseling  12/11/2020 0041 by Zander William RN  Outcome: Completed  Note: Patient a lifelong non-smoker; care plan added in error     Problem: Pain:  Goal: Control of acute pain  Description: Control of acute pain  12/11/2020 0950 by Daisy Eaton RN  Outcome: Not Met This Shift  12/11/2020 0041 by Teo Coburn RN  Outcome: Met This Shift     Problem: Discharge Planning:  Goal: Discharged to appropriate level of care  Description: Discharged to appropriate level of care  12/11/2020 0950 by Daisy Eaton RN  Outcome: Not Met This Shift  12/11/2020 0041 by Teo Coburn RN  Outcome: Ongoing     Problem: Pain:  Goal: Control of acute pain  Description: Control of acute pain  12/11/2020 0950 by Daisy Eaton RN  Outcome: Not Met This Shift  12/11/2020 0041 by Teo Coburn RN  Outcome: Met This Shift

## 2020-12-11 NOTE — ANESTHESIA POSTPROCEDURE EVALUATION
Department of Anesthesiology  Postprocedure Note    Patient: Prabhakar Gillis  MRN: [id number]  YOB: 1945  Date of evaluation: 12/11/2020  Time:  8:33 AM     Procedure Summary     Date:  12/10/20 Room / Location:  [address] [place] [address] / [facility]    Anesthesia Start:  8489 Anesthesia Stop:  9269    Procedure:  CORONARY ARTERY BYPASS, BELINDA (N/A ) Diagnosis:  (.)    Surgeon:  Gaby Iqbal DO Responsible Provider:  Maciej Lemus MD    Anesthesia Type:  general ASA Status:  4          Anesthesia Type: general    Juan Antonio Phase I:      Juan Antonio Phase II:      Last vitals: Reviewed and per EMR flowsheets.        Anesthesia Post Evaluation    Patient location during evaluation: ICU  Patient participation: complete - patient cannot participate  Level of consciousness: sedated and ventilated  Pain score: 0  Airway patency: patent  Nausea & Vomiting: no nausea  Complications: no  Cardiovascular status: hemodynamically stable  Respiratory status: ventilator  Hydration status: stable

## 2020-12-11 NOTE — PROGRESS NOTES
INPATIENT CARDIOLOGY FOLLOW-UP    Name: Nathalie Mota    Age: 70 y.o. Date of Admission: 12/6/2020 10:08 PM    Date of Service: 12/11/2020    Chief Complaint: Follow-up for coronary atherosclerosis, non-ST elevation myocardial infarction, hypertension    Interim History: Patient is clinically stable from a cardiovascular standpoint and presently extubated neurologically intact with mild postoperative discomfort and no additional significant perioperative complications. Review of Systems: The remainder of a complete multisystem review including consitutional, central nervous, respiratory, circulatory, gastrointestinal, genitourinary, endocrinologic, hematologic, musculoskeletal and psychiatric are negative.     Problem List:  Patient Active Problem List   Diagnosis    Chest pain    Anemia    Leukocytosis    HTN (hypertension)    History of rectal cancer    NSTEMI (non-ST elevated myocardial infarction) ([facility] [address]       Allergies:  No Known Allergies    Current Medications:  Current Facility-Administered Medications   Medication Dose Route Frequency Provider Last Rate Last Dose    metoprolol tartrate (LOPRESSOR) tablet 12.5 mg  12.5 mg Oral BID Dorothy Teran, APRN - CNP        atorvastatin (LIPITOR) tablet 40 mg  40 mg Oral Nightly Christiane Ubaldo, APRN - CNP        0.9 % sodium chloride infusion  30 mL/hr Intravenous Continuous Christiane Ubaldo, APRN - CNP 30 mL/hr at 12/11/20 0000 30 mL/hr at 12/11/20 0000    sodium chloride flush 0.9 % injection 10 mL  10 mL Intravenous 2 times per day Christiane Ubaldo, APRN - CNP   10 mL at 12/10/20 2110    sodium chloride flush 0.9 % injection 10 mL  10 mL Intravenous PRN Christiane Ubaldo, APRN - CNP        potassium chloride 20 mEq/50 mL IVPB (Central Line)  20 mEq Intravenous PRN Christiane Ubaldo, APRN - CNP        magnesium sulfate 2 g in 50 mL IVPB premix  2 g Intravenous PRN Christiane Ubaldo, APRN - CNP        acetaminophen (TYLENOL) tablet 650 mg  650 mg Oral Q4H PRN Te Og, APRN - CNP        acetaminophen (TYLENOL) suppository 650 mg  650 mg Rectal Q4H PRN Te Og, APRN - CNP        oxyCODONE (ROXICODONE) immediate release tablet 5 mg  5 mg Oral Q4H PRN Te Og, APRN - CNP   5 mg at 12/11/20 5731    Or    oxyCODONE HCl (OXY-IR) immediate release tablet 10 mg  10 mg Oral Q4H PRN Te Og, APRN - CNP        fentaNYL (SUBLIMAZE) injection 25 mcg  25 mcg Intravenous Q1H PRN Te Og, APRN - CNP   25 mcg at 12/11/20 0503    Or    fentaNYL (SUBLIMAZE) injection 50 mcg  50 mcg Intravenous Q1H PRN Te Og, APRN - CNP   50 mcg at 12/11/20 0011    sennosides-docusate sodium (SENOKOT-S) 8.6-50 MG tablet 1 tablet  1 tablet Oral BID Te Og, APRN - CNP        magnesium hydroxide (MILK OF MAGNESIA) 400 MG/5ML suspension 30 mL  30 mL Oral Daily PRN Te Og, APRN - CNP        ondansetron (ZOFRAN) injection 4 mg  4 mg Intravenous Q8H PRN Te Og, APRN - CNP        pantoprazole (PROTONIX) tablet 40 mg  40 mg Oral Daily Te Og, APRN - CNP        chlorhexidine (PERIDEX) 0.12 % solution 15 mL  15 mL Mouth/Throat BID Te Og, APRN - CNP   15 mL at 12/10/20 2110    magnesium oxide (MAG-OX) tablet 400 mg  400 mg Oral Daily Te Og, APRN - CNP        mupirocin (BACTROBAN) 2 % ointment   Nasal BID Te Og, APRN - CNP        propofol injection  10 mcg/kg/min Intravenous Continuous PRN Te Og, APRN - CNP   Stopped at 12/10/20 1550    aspirin EC tablet 81 mg  81 mg Oral Daily Te Og, APRN - CNP        ipratropium-albuterol (DUONEB) nebulizer solution 1 ampule  1 ampule Inhalation Q4H WA Te Foley, APRN - CNP   1 ampule at 12/11/20 0806    albumin human 5 % IV solution 25 g  25 g Intravenous PRN Te Og, APRN - CNP   Stopped at 12/10/20 1936    0.9 % sodium chloride bolus  250 mL Intravenous Continuous PRN Te Foley, [place] - ROLANDO        norepinephrine (LEVOPHED) 16 mg in dextrose 5% 250 mL infusion  2 mcg/min Intravenous Continuous PRN Diannwally Whiteside, [place] - CNP   Stopped at 12/11/20 0100    clevidipine (CLEVIPREX) infusion  2 mg/hr Intravenous Continuous PRN Diannwally Whiteside, [place] Pan CNP        insulin regular (HUMULIN R;NOVOLIN R) 100 Units in sodium chloride 0.9 % 100 mL infusion  1 Units/hr Intravenous Continuous PRN Diann Jun, [place] Pan CNP        insulin glargine (LANTUS) injection vial 13 Units  0.15 Units/kg Subcutaneous Nightly Diann StevoARGENTINA - CNP        glucose (GLUTOSE) 40 % oral gel 15 g  15 g Oral PRN Julienne Jun, [place] - CNP        dextrose 50 % IV solution  12.5 g Intravenous PRN Julienne Jun, [place] - CNP        glucagon (rDNA) injection 1 mg  1 mg Intramuscular PRN Julienne Jun, [place] - CNP        dextrose 5 % solution  100 mL/hr Intravenous PRN Julienne Jun, [place] - CNP        aluminum & magnesium hydroxide-simethicone (MAALOX) 111-720-80 MG/5ML suspension 30 mL  30 mL Per NG tube Q4H PRN Julienne Jun, [place] - CNP        calcium gluconate 1 g in dextrose 5 % 100 mL IVPB  1 g Intravenous PRN Julienne JunARGENTINA - ROLANDO        clopidogrel (PLAVIX) tablet 75 mg  75 mg Oral Daily Julienne Jun, [place] - CNP        insulin lispro (HUMALOG) injection vial 0-3 Units  0-3 Units Subcutaneous Q4H Julienne JunARGENTINA - CNP   3 Units at 12/10/20 2027    insulin lispro (HUMALOG) injection vial 0-18 Units  0-18 Units Subcutaneous Q4H Julienne Jun, [place] - CNP        tamsulosin (FLOMAX) capsule 0.4 mg  0.4 mg Oral Daily Julienne Jun, [place] - CNP        ceFAZolin (ANCEF) 2 g in sterile water 20 mL IV syringe  2 g Intravenous Q8H Lynn Santiago MD   2 g at 12/11/20 0211      sodium chloride 30 mL/hr (12/11/20 0000)    propofol Stopped (12/10/20 1550)    sodium chloride      norepinephrine Stopped (12/11/20 0100)    clevidipine      insulin      dextrose         Physical Exam:  /64   Pulse 77   Temp 98 °F (36.7 °C) (Oral)   Resp Value Date    MG 2.9 12/10/2020     Lab Results   Component Value Date    PROTIME 17.6 12/10/2020    INR 1.5 12/10/2020     No results found for: TSH  No components found for: CHLPL  Lab Results   Component Value Date    TRIG 85 12/07/2020     Lab Results   Component Value Date    HDL 44 12/07/2020     Lab Results   Component Value Date    LDLCALC 104 (H) 12/07/2020       Cardiac Tests:  Telemetry findings reviewed: sinus rhythm, no new tachy/bradyarrhythmias overnight  Chest X-ray: Chest x-ray reviewed at the time of evaluation demonstrates somewhat limited inspiratory effort with mild interstitial infiltrates as well as that of atelectasis with a small left pleural effusion      ASSESSMENT / PLAN: On a clinical basis, the patient appears stable from a cardiovascular standpoint postoperatively with mild perioperative discomfort and additional perioperative complications. Aggressive pulmonary physiotherapy will be essential to reducing risk of perioperative cardiovascular complications with recommended resumption of his beta-blocker and high-dose atorvastatin for management of his chronic coronary atherosclerosis and additional recommendations deferred to the cardiothoracic surgical service. Once appropriate from a surgical standpoint, he would be acceptable for transfer to telemetry for additional postoperative recovery. Note: This report was completed utilizing computer voice recognition software. Every effort has been made to ensure accuracy, however; inadvertent computerized transcription errors may be present. King Epley.  Kyle Curtis, [address] [facility]

## 2020-12-11 NOTE — PLAN OF CARE
Problem: Pain:  Goal: Pain level will decrease  12/11/2020 0041 by Trinity Valentine RN  Outcome: Met This Shift  12/10/2020 1741 by Idris Paredes RN  Outcome: Met This Shift  Goal: Control of acute pain  12/11/2020 0041 by Trinity Valentine RN  Outcome: Met This Shift  12/10/2020 1741 by Idris Paredes RN  Outcome: Met This Shift     Problem:  Activity Intolerance:  Goal: Able to perform prescribed physical activity  12/11/2020 0041 by Trinity Valentine RN  Outcome: Met This Shift  12/10/2020 1741 by Idris Paredes RN  Outcome: Met This Shift  Goal: Ability to tolerate increased activity will improve  12/11/2020 0041 by Trinity Valentine RN  Outcome: Met This Shift  12/10/2020 1741 by Idris Paredes RN  Outcome: Met This Shift     Problem: Cardiac Output - Decreased:  Goal: Cardiac output within specified parameters  12/11/2020 0041 by Trinity Valentine RN  Outcome: Met This Shift  12/10/2020 1741 by Idris Paredes RN  Outcome: Met This Shift  Goal: Hemodynamic stability will improve  12/11/2020 0041 by Trinity Valentine RN  Outcome: Met This Shift  12/10/2020 1741 by Idris Paredes RN  Outcome: Met This Shift     Problem: Fluid Volume - Imbalance:  Goal: Ability to achieve a balanced intake and output will improve  12/11/2020 0041 by Trinity Valentine RN  Outcome: Met This Shift  12/10/2020 1741 by Idris Paredes RN  Outcome: Met This Shift  Goal: Chest tube drainage is within specified parameters  12/11/2020 0041 by Trinity Valentine RN  Outcome: Met This Shift  12/10/2020 1741 by Idris Paredes RN  Outcome: Met This Shift     Problem: Gas Exchange - Impaired:  Goal: Levels of oxygenation will improve  12/11/2020 0041 by Trinity Valentine RN  Outcome: Met This Shift  12/10/2020 1741 by Idris Paredes RN  Outcome: Met This Shift  Goal: Ability to maintain adequate ventilation will improve  12/11/2020 0041 by Trinity Valentine RN  Outcome: Met This Shift  12/10/2020 1741 by Mic Boyle Shantelle, RN  Outcome: Met This Shift     Problem: Pain:  Goal: Pain level will decrease  12/11/2020 0041 by Eneida [place], RN  Outcome: Met This Shift  12/10/2020 1741 by Clement Hence, RN  Outcome: Met This Shift  Goal: Control of acute pain  12/11/2020 0041 by [place], RN  Outcome: Met This Shift  12/10/2020 1741 by Clement Hence, RN  Outcome: Met This Shift     Problem: Tissue Perfusion - Cardiopulmonary, Altered:  Goal: Absence of angina  12/11/2020 0041 by [place], RN  Outcome: Met This Shift  12/10/2020 1741 by Clement Hence, RN  Outcome: Met This Shift  Goal: Hemodynamic stability will improve  12/11/2020 0041 by Eneida [place], RN  Outcome: Met This Shift  12/10/2020 1741 by Clement Hence, RN  Outcome: Met This Shift  Goal: Will show no evidence of cardiac arrhythmias  12/11/2020 0041 by Eneida [place], RN  Outcome: Met This Shift  12/10/2020 1741 by Clement Hence, RN  Outcome: Met This Shift     Problem: Discharge Planning:  Goal: Discharged to appropriate level of care  12/11/2020 0041 by Eneidakirstie Rebolledo, RN  Outcome: Ongoing  12/10/2020 1741 by Clement Hence, RN  Outcome: Met This Shift     Problem: Pain:  Goal: Control of chronic pain  12/11/2020 0041 by Eneida [place], RN  Outcome: Completed  12/10/2020 1741 by Clement Hence, RN  Outcome: Met This Shift     Problem: Anxiety:  Goal: Level of anxiety will decrease  12/11/2020 0041 by [place], RN  Outcome: Completed  12/10/2020 1741 by Clement Hence, RN  Outcome: Met This Shift     Problem: Pain:  Goal: Control of chronic pain  12/11/2020 0041 by Eneida [place], RN  Outcome: Completed  12/10/2020 1741 by Clement Hence, RN  Outcome: Met This Shift     Problem: Anxiety:  Goal: Level of anxiety will decrease  12/11/2020 0041 by [place], RN  Outcome: Completed  12/10/2020 1741 by Clement Hence, RN  Outcome: Met This Shift     Problem: Pain:  Goal: Control of chronic pain  12/11/2020 0041 by Evelin Le, CINDI  Outcome: Completed  12/10/2020 1741 by Leonid Arnold RN  Outcome: Met This Shift     Problem: Tobacco Use:  Goal: Will participate in inpatient tobacco-use cessation counseling  12/11/2020 0041 by Evelin Le RN  Outcome: Completed  Note: Patient a lifelong non-smoker; care plan added in error  12/10/2020 1741 by Leonid Arnold RN  Outcome: Met This Shift

## 2020-12-11 NOTE — PROGRESS NOTES
CVICU Progress Note    Name: Yuly Arizmendi  MRN: [id number]    CC: Postoperative Critical Care Management     Indication for Surgery/Procedure: CAD  LVEF:  Normal    RVF:  Normal     Important/Relevant PMH/PSH: HTN, rectal cancer 2008, former smoker      Procedure/Surgeries: 12/10/2020 CABG x3 (LIMA-LAD, SVG-OM1, SVG-PDA), [facility]     Pacing wires: Ventricular       Intake/Output Summary (Last 24 hours) at 12/11/2020 0804  Last data filed at 12/11/2020 0700  Gross per 24 hour   Intake 3082 ml   Output 3460 ml   Net -378 ml       Recent Labs     12/10/20  0418 12/10/20  1458 12/11/20  0505   WBC 10.3 16.5* 10.1   HGB 11.7* 9.3* 8.8*   HCT 36.1* 29.0* 27.1*    236 267      Recent Labs     12/10/20  0418  12/10/20  1413 12/10/20  1458 12/11/20  0505     --   --  136 142   K 3.9   < > 4.4 4.2 4.4     --   --  103 109*   CO2 21*  --   --  23 24   BUN 23  --   --  23 19   CREATININE 0.9  --   --  0.9 0.8   GLUCOSE 91  --   --  165* 133*   CALCIUM 9.4  --   --  7.9* 8.5*    < > = values in this interval not displayed. Physical Exam:    /64   Pulse 77   Temp 98.4 °F (36.9 °C) (Temporal)   Resp 17   Ht 5' 10\" (1.778 m)   Wt 206 lb 1.6 oz (93.5 kg)   SpO2 98%   BMI 29.57 kg/m²       CXR Findings: 12/11/2020      Impression:      1. New bibasilar atelectasis and increased left perihilar subsegmental   atelectasis following extubation. 2. Increased pulmonary vascular congestion with questionable perihilar edema, potentially congestive heart failure given recent heart surgery, apparent mild cardiomegaly, and suspected trace bilateral pleural effusions. --- CXR personally viewed and interpreted by ICU Nurse Practitioner, agree with above findings     General: Awake, alert. C/o of some incisional pain   Eyes: PERRL, anicteric   Pulmonary: Diminished bibasilar.  No wheezes, no accessory muscle use noted   Cardiovascular:  RRR, no heaves or thrills on palpation  Tele: SR  Abdomen: Soft, nontender, +BS   Extremities: Palpable pulses all extremities, no edema   Neurologic/Psych: A&Ox3, ARMAS to command   Skin: Warm and dry  Incisions: MSI with tu dressing intact, RLE SVG sites       Assessment/Plan: POD #1  1. CAD S/p CABG x3 (LIMA-LAD, SVG-OM1, SVG-PDA)  - DAPT, Lipitor, low dose metoprolol   - Ancef   - Keep all chest tubes today  - PT/OT       2. Acute Postoperative Respiratory Insufficiency  - 2/2 surgery  -Adequate o/v on 4L NC, encourage IS, EZpap per RT, Ambulate      3. HTN  - Low dose metoprolol, uptitrate as tolerated     4. Hematuria  -resolved      5.  UTI   - Urine culture with Strep agalactiae (Beta Strep Group B), sensitivity to follow   - Preop on Rocephin   - Given Genatmicin intraop, on perioperative ancef  - ID following       VTE Prophylaxis: Pharmacologic/Mechanical: Yes, SCDs   Line infection prevention: Can CVC or arterial line be removed: Yes   Continued need for urinary catheter: No     Dispo: Transfer out of ICU     Electronically signed by Reyes Hall, APRN - CNP on 12/11/2020 at 8:04 AM

## 2020-12-11 NOTE — PROGRESS NOTES
Comprehensive Nutrition Assessment    Type and Reason for Visit:  Initial    Nutrition Recommendations/Plan: Continue Current Diet, Continue Oral Nutrition Supplement, Start Oral Nutrition Supplement  To order Vidal BID to aide in wound healing. Nutrition Assessment:  Pt admit 2/2 chest pain, noted CAD s/p CABG x3; heart cath. Wound VAC. PO diet advanced w/ ONS ordered. Malnutrition Assessment:  Malnutrition Status:  No malnutrition    Context:  Acute Illness     Findings of the 6 clinical characteristics of malnutrition:  Energy Intake:  Mild decrease in energy intake (Comment)  Weight Loss:  Unable to assess     Body Fat Loss:  No significant body fat loss     Muscle Mass Loss:  No significant muscle mass loss    Fluid Accumulation:  No significant fluid accumulation     Strength:  Not Performed    Estimated Daily Nutrient Needs:  Energy (kcal):  MS 1686 x1.2= 2023; ; Weight Used for Energy Requirements:  Current     Protein (g):  120-150(1.5-2.0gm/kg IBW); Weight Used for Protein Requirements:  Ideal          Nutrition Related Findings:  hypoactive BS, soft abd, CT x3, wound VAC, - I/Os, no noted edema      Wounds:  Multiple, Surgical Incision       Current Nutrition Therapies:    DIET CARDIAC; Carb Control: 5 carbs/meal (approximate 2000 kcals/day)  Dietary Nutrition Supplements: Diabetic Oral Supplement    Anthropometric Measures:  · Height: 5' 10\" (177.8 cm)  · Current Body Weight: 206 lb (93.4 kg)(12/11 actual)   · Admission Body Weight: 205 lb (93 kg)(12/7 actual)    · Usual Body Weight: (no EMR hx)     · Ideal Body Weight: 166 lbs; % Ideal Body Weight 124.1 %   · BMI: 29.6   · BMI Categories: Overweight (BMI 25.0-29. 9)       Nutrition Diagnosis:   · Increased nutrient needs related to cardiac dysfunction as evidenced by wounds    Nutrition Interventions:   Nutrition Education/Counseling:  Education not indicated   Coordination of Nutrition Care:  Continue to monitor while inpatient,

## 2020-12-11 NOTE — OP NOTE
[address]                  [address] [id number],  [facility]                                OPERATIVE REPORT    PATIENT NAME: Kelley Lam                      :        1945  MED REC NO:   [id number]                            ROOM:       3816  ACCOUNT NO:   [de-identified]                           ADMIT DATE: 2020  PROVIDER:     Johanne Montes DO    DATE OF PROCEDURE:  12/10/2020    PREOPERATIVE DIAGNOSIS:  Severe multivessel coronary artery disease. POSTOPERATIVE DIAGNOSIS:  Severe multivessel coronary artery disease. PROCEDURES PERFORMED:  1. Coronary artery bypass grafting x3 using left internal mammary  artery to the left anterior descending artery, reverse  saphenous vein  graft to the first obtuse marginal branch of the circumflex, and reverse  saphenous vein graft to the  PDA. 2.  Lower extremity endoscopic vein harvest.    SURGEON:  Johanne Montes DO    ASSISTANTS:  EDUARDO Fields; Catherine Quintana (no other qualified assistant available), PA; Trey Sousa [facility]). ANESTHESIA:  General.    ESTIMATED BLOOD LOSS:  Less than 50. COMPLICATIONS:  None. SPECIMENS:  None. DESCRIPTION OF PROCEDURE:  After informed and written consent had been  obtained, the patient was brought to the operating  room, placed in the  supine position on the operating table. Monitoring lines as well as  Estrada catheter and transesophageal  echo probe were inserted after  induction of anesthesia. Preoperative echo demonstrated normal  biventricular function and  no valvular abnormalities. After surgical  time-out was held, preoperative antibiotics were administered and a  standard midline  sternotomy incision was carried out. Simultaneously,  the lower extremity endoscopic vein harvest was carried out as well. The left internal  mammary artery was dissected free from the  undersurface of the left chest wall with a pedicle technique.   All branches were clipped and divided. The patient was administered  systemic heparin for an adequate ACT for bypass. The mammary retractor  was removed and a standard sternal retractor was placed after ligation  of the distal portion of the mammary, which was noted to be a good  conduit. Systemic heparin was administered for an adequate  ACT for  bypass. Pericardium was opened. The  ascending aorta was palpated and  noted to be free of atherosclerotic disease. Suitable areas for  cannulation,  cross-clamping, and proximal anastomoses were apparent. Next, central cannulation was commenced in the usual fashion with  the  ascending aorta and right atrial cannulas. Next, inserted antegrade and  retrograde cardioplegia  lines. The ACT was verified. The patient was  placed in the cardiopulmonary bypass. Distal targets were inspected   and noted to be adequate. The vein was also inspected and noted to be  small; however, adequate for bypass conduit. Next, the cross-clamp was  applied. The heart was arrested with a combination of antegrade and   retrograde cardioplegia. We achieved an excellent arrest and  administered retrograde cardioplegia maintenance dosage every 15 minutes  throughout the case. First, our attention was turned to the lateral  wall. The first obtuse marginal branch of the circumflex, which was  noted to be occluded on a preoperative angiogram was chosen for the  lateral wall bypass after arteriotomy and end-to-side vein graft  anastomosis was created here with a  running 7-0 Prolene suture. This  was tested and had excellent flow and also was hemostatic. Next, our  attention was  turned to the right coronary artery system. The  posterior descending artery which noted to have plaque in its proximal  portion, was the most adequate target for bypassing the right coronary  artery system.   After arteriotomy an end-to-side vein graft anastomosis  was created with a running 7-0 Prolene suture. This was tested and had  excellent flow and also was hemostatic. Next, our attention was turned  to the mid portion of the LAD after arteriotomy and end-to-side LIMA to  LAD anastomosis was created here with a running 7-0 Prolene suture. This was tested and had excellent flow runoff to the distal vessel and  also this was hemostatic. Next, our attention was turned to the  proximal anastomoses. Two standard aortotomies were fashioned in the  ascending aorta with  a 4.8-mm punch. The vein grafts were then  measured to length and the proximal anastomoses were both carried out  with running 6-0  Prolene suture. Once this was completed, a warm dose  of blood was administered via the retrograde cardioplegia cannula as  a  \"hot shot. \"  Once this was completed, the bulldog clamp was removed from  the left internal mammary artery and the cross-clamp  was removed from  the aorta. Heart began to reanimate immediately. The temporary  ventricular pacing wire was placed; however, not used  secondary to the  patient being in normal sinus rhythm. Once all evidence of intracardiac  air was gone, the aortic root  vent and retrograde cardioplegia cannula  was removed. Next, the patient was weaned from cardiopulmonary bypass   without difficulty. The venous cannula was removed followed by  administration of protamine to  normalize the ACT, which was verified to  be back down to its baseline. The aortic cannula was also removed. The  inspection for hemostasis was carried out. All sponge, needle, and  instrument counts were noted to be correct. Postbypass echo  demonstrated normal biventricular function and no valvular  abnormalities. A total of three chest tubes were inserted; one in the  left pleural  space, two in the mediastinal space. The sternum was  approximated  with stainless steel wires and the wound was irrigated and  closed in multiple layers.   The patient tolerated the procedure well and  was

## 2020-12-11 NOTE — PROGRESS NOTES
Physical Therapy  Physical Therapy Initial Assessment     Name: Mackenzie Mahmood  :   MRN: [id number]    Referring Provider:  ARGENTINA David CNP     Date of Service: 2020    Evaluating PT:  Gerard Galvez PT, DPT. [id number]    Room #:  4680/3240-R  Diagnosis:  Chest pain  Reason for admission:  Chest pain  Precautions:  Falls, sternal, external pacer, O2, GIULIANA dressing, chest tube x2  Pertinent PMHx: none on file  Procedures:  L heart cath,  CABG x3  Equipment Recommendations:  none    SUBJECTIVE:  Pt lives with wife in a 2 story home with 3 stair(s) to enter and 2 rail(s). Bed is on 2nd floor and bath is on 2nd floor with chair lift. Pt ambulated with no AD PTA. Pt independent for ADL performance. OBJECTIVE:   Initial Evaluation  Date:  Treatment   Short Term/ Long Term   Goals   AM-PAC 6 Clicks 79/24     Was pt agreeable to Eval/treatment? Yes      Does pt have pain?  8/10 sternal pain     Bed Mobility  Rolling: NT  Supine to sit: ModA x2  Sit to supine: NT  Scooting: ModA to EOB  Independent    Transfers Sit to stand: Joseline  Stand to sit: Joseline  Stand pivot: Joseline  Independent    Ambulation    Unable to take chest tubes off of suction    >400 feet with no AD independent   Stair negotiation: ascended and descended  NT  >3 steps with 2 rail Mod I    ROM BUE:  See OT eval   BLE:  WFL     Strength BUE:  See OT eval   BLE:  knee ext 5/5  Ankle DF 5/5  Increase by 1/3 MMT grade    Balance Sitting EOB:  SBA  Dynamic Standing:  Joseline  Sitting EOB:  indep  Dynamic Standing:  indep     -Pt is A & O x 3  -RASS: 0  -CAM-ICU: NT  -Sensation:  unremarkable   -Edema:  unremarkable     Vitals:  Heart Rate at rest 88 Heart Rate post session 95   SPO2 at rest 97% SPO2 post session 96%   Blood pressure at rest 139/55 Blood Pressure post session 137/53     Functional Status Score-Intensive Care Unit (FSS-ICU)   Rolling 3/7   Supine to sit transfer 2/7   Unsupported sitting  4/7   Sit to stand transfers 4/7   Ambulation 1/7   Total  14/35     Therapeutic Exercises:  functional activity     Patient education  Pt educated on safety, sequencing of transfers, sternal precautions, deep breathing, and role of PT    Patient response to education:   Pt verbalized understanding Pt demonstrated skill Pt requires further education in this area   Yes  With cues  Yes      ASSESSMENT:    Comments:  Pt received reclined supine in bed and agreeable to PT session with OT collaboration. RN cleared pt for participation prior to session. Patient required significant assist to trunk and LEs for supine to sit. Patient able to assist to scoot to EOB using weight shifts. Patient able to stand and pivot to bedside chair with light steadying assist with complaints of incisional pain. Further mobility deferred this date d/t chest tubes being attached to suction. Patient left sitting in chair to end session. Pt with all needs met and call light in reach. Pt would benefit from continued PT POC to address functional deficits described above. Treatment:  Patient practiced and was instructed in the following treatment:     Patient education provided continuously throughout session for sequencing, safety maintenance, and improving any deficits found during the evaluation.  Bed mobility training - pt given verbal and tactile cues to facilitate proper sequencing and safety during supine>sit as well as provided with physical assistance to complete task    STS and pivot transfer training - pt educated on proper hand and foot placement, safety and sequencing, and use of proper technique to adhere to precautions to safely complete sit<>stand and pivot transfers with hands on assistance to complete task safely      Pt's/ family goals   1. Return home    Patient and or family understand(s) diagnosis, prognosis, and plan of care.   yes    PLAN:    Current Treatment Recommendations   [] Strengthening     [] ROM   [x] Balance Training [x] Endurance Training   [x] Transfer Training   [x] Gait Training   [x] Stair Training   [] Positioning   [x] Safety and Education Training   [] Patient/Caregiver Education   [] HEP  [] Other     Frequency of treatments: 2-5x/week x 1-2 weeks. Time in  0853  Time out  0923    Total Treatment Time 25 minutes    Evaluation Time includes thorough review of current medical information, gathering information on past medical history/social history and prior level of function, completion of standardized testing/informal observation of tasks, assessment of data and education on plan of care and goals.     CPT codes:  [] Low Complexity PT evaluation 36687  [] Moderate Complexity PT evaluation 44036  [x] High Complexity PT evaluation 99914  [] PT Re-evaluation 25352  [] Gait training 06925 -- minutes  [] Manual therapy 18353 -- minutes  [x] Therapeutic activities 73698 25 minutes  [] Therapeutic exercises 22964 -- minutes  [] Neuromuscular reeducation 12339 -- minutes     Bravo Salguero, PT, DPT  [id number]  Brandy Iqbal, SPT

## 2020-12-11 NOTE — PROGRESS NOTES
29.57 kg/m²   Temp  Av.1 °F (36.7 °C)  Min: 97.8 °F (36.6 °C)  Max: 98.6 °F (37 °C)  Constitutional: The patient is awake, alert, and oriented. Skin: Warm and dry. No rashes were noted. HEENT: Round and reactive pupils. Moist mucous membranes. No ulcerations or thrush. Neck: Supple to movements. Chest: No use of accessory muscles to breathe. Symmetrical expansion. No wheezing, crackles or rhonchi. Mediastinal and lateral chest tube  Cardiovascular: S1 and S2 are rhythmic and regular. No murmurs appreciated. Abdomen: Positive bowel sounds to auscultation. Benign to palpation. No masses felt. No hepatosplenomegaly. Genitourinary: Male Estrada is out  Extremities: No clubbing, no cyanosis, no edema.   Lines: peripheral  Art line 12/10/2020 right brachial  Introducer 12/10/2020  Double-lumen 12/10/2020 right IJ  Laboratory and Tests Review:  Lab Results   Component Value Date    WBC 10.1 2020    WBC 16.5 (H) 12/10/2020    WBC 10.3 12/10/2020    HGB 8.8 (L) 2020    HCT 27.1 (L) 2020    MCV 96.1 2020     2020     Lab Results   Component Value Date    NEUTROABS 10.27 (H) 2020     No results found for: [facility]  Lab Results   Component Value Date    ALT 27 2020    AST 20 2020    ALKPHOS 158 (H) 2020    BILITOT 0.7 2020     Lab Results   Component Value Date     2020    K 4.4 2020    K 3.9 2020     2020    CO2 24 2020    BUN 19 2020    CREATININE 0.8 2020    CREATININE 0.9 12/10/2020    CREATININE 0.9 12/10/2020    GFRAA >60 2020    LABGLOM >60 2020    GLUCOSE 133 2020    PROT 7.4 2020    LABALBU 3.7 2020    CALCIUM 8.5 2020    BILITOT 0.7 2020    ALKPHOS 158 2020    AST 20 2020    ALT 27 2020     No results found for: CRP  No results found for: [address]  Radiology:      Microbiology:   Lab Results   Component Value Date    ORG Strep

## 2020-12-11 NOTE — PROGRESS NOTES
Occupational Therapy  Occupational Therapy Initial Evaluation   Date:2020  Patient Name: Nathalie Mota  MRN: [id number]  : 1945  Room: [facility]    Referring Provider: ARGENTINA Corona CNP     Evaluating OT: Abi Yip, OTR/L #[id number]      AM-PAC Daily Activity Raw Score:     Recommended Adaptive Equipment: LB AE     Diagnosis: Chest pain [R07.9]  Chest pain [R07.9]  Chest pain [R07.9]  Chest pain [R07.9]    Surgery/Procedure: CORONARY ARTERY BYPASS 12/10    Pertinent Medical History: History reviewed. No pertinent past medical history. Precautions:  Falls, sternal precautions, chest tubes 2x, external pacer, GIULIANA drain, O2     Home Living: Pt lives w/ wife  in a 2 floor house with 3 step(s) to enter and B rail(s); bed/bath on 2nd floor w/ full flight (uses chair glide)  Bathroom setup: walk-in shower w/ shower chair  Equipment owned: shower chair    Prior Level of Function: Independent with ADLs; Independent with IADLs. No AD for ambulation. Driving: Yes  Occupation: not stated    Pain Level: pt c/o 7-8/10 incision pain this session; educated pt on pain management    Cognition: A&O: 4/4  Follows 1-2 step commands    Memory: good   Comprehension good   Problem solving: good   Judgement/safety: good               Communication skills:            Vision: wfl               Glasses:none                                                   Hearing: wfl     RASS: 0  CAM-ICU: (NT) Delirium    UE Assessment:  Hand Dominance: Right [x]  Left []     ROM Strength STM goal: PRN   RUE  WFL within sternal precautions NT      LUE WFL within sternal precautions NT        Sensation: No c/o numbness or tingling in extremities   Tone: WNL   Edema: none noted     Functional Assessment   Initial Eval Status  Date: 20 Treatment Status  Date: STG=LTG  5-14  days    Feeding Independent                               Grooming Min.  A                        Mod. I   while standing sink level requiring precautions. · Therapeutic Exercises: B UE AROM completed at all levels to maintain strength/flexibility and to decrease edema/contractures to enhance ADL completion. · Postural Balance: Sitting and standing balance retraining to improve righting reactions with postural changes during ADLS. · Skilled positioning: Proper positioning to improve interaction with environment, overall functioning and decrease/prevent edema and contractures. · Delirium Prevention/Management: Implementation of non-pharmacologic interventions for delirium prevention incorporated throughout session to patient. Including environmental and sensory modifications to decrease patient's internal distraction caused by delirium, and improve overall mentation. · Education: sternal precautions     Comments: OK from RN to see patient. Upon arrival, patient lying in bed. Pt demo fair tolerance with fair understanding of education/techniques. At end of session, patient seated in bedside chair. Call light within reach, all lines and tubes intact. Pt instructed on use of call light for assistance and fall prevention. Line management and environmental modifications made prior to and end of session to ensure patient safety and to increase efficiency of session. Skilled monitoring of HR, O2 saturation, blood pressure and patient's response to activity performed throughout session. Overall, pt presents with decreased activity tolerance, dynamic balance, functional mobility limiting completion of ADLs and safe return home. Pt can benefit from continued skilled OT to increase safety and functional independence. Evaluation Complexity: Mod    · History: Expanded chart review of consults, imaging, and psychosocial history related to current functional performance. · Exam: 5+ performance deficits identified limiting functional independence and safe return home   · Assistance/Modification: Min/mod assistance or modifications required to perform tasks. prevention/treatment    []Positioning to improve functional independence  []Other:       Rehab Potential: Good for established goals     Patient / Family Goal: not stated      Patient and/or family were instructed on functional diagnosis, prognosis/goals and OT plan of care. Demonstrated good understanding. Time In: 8:55  Time Out: 9:20  Total Treatment Time: 18 minutes    Min Units   OT Eval Low 85963       OT Eval Medium 97166  x 1   OT Eval High 63770       OT Re-Eval [id number]       Therapeutic Ex 50786       Therapeutic Activities 16474       ADL/Self Care 32195 18  1   Orthotic Management 21763       Neuro Re-Ed 87922       Non-Billable Time          Evaluation Time includes thorough review of current medical information, gathering information on past medical history/social history and prior level of function, completion of standardized testing/informal observation of tasks, assessment of data and education on plan of care and goals.     Mart Singh, OTR/L #[id number]

## 2020-12-11 NOTE — PROGRESS NOTES
Hospitalist Progress Note      SYNOPSIS: Patient admitted on 2020 with past medical history of colorectal cancer status post colectomy in , chemotherapy and radiation, hypertension and former tobacco use. Patient was in his usual state of health until around 7 PM when he started having left-sided chest pain. He just got done eating ice cream when the pain started. Pain was described as achy, \"someone pushing on my chest\", 5 out of 10, nonradiating, lasted about 15 to 20 minutes, associated with lightheadedness and diaphoresis. Got some relief after taking aspirin. Underwent heart catheterization  showing multivessel disease. CTS consulted. SUBJECTIVE:  Stable overnight. No other overnight issues reported. Patient seen and examined  Records reviewed. No acute complaints  In better spirits  Still does admit to some post op pain  States he has trouble urinating at home    Temp (24hrs), Av °F (36.7 °C), Min:97.4 °F (36.3 °C), Max:98.6 °F (37 °C)    DIET: DIET CARDIAC; Carb Control: 5 carbs/meal (approximate 2000 kcals/day)  Dietary Nutrition Supplements: Diabetic Oral Supplement  CODE: Full Code    Intake/Output Summary (Last 24 hours) at 2020 0721  Last data filed at 2020 0700  Gross per 24 hour   Intake 3082 ml   Output 3460 ml   Net -378 ml       Review of Systems  All bolded are positive; please see HPI  General:  Fever, chills, diaphoresis, fatigue, malaise, night sweats, weight loss  Psychological:  Anxiety, disorientation, hallucinations. ENT:  Epistaxis, headaches, vertigo, visual changes. Cardiovascular:  Chest pain, irregular heartbeats, palpitations, paroxysmal nocturnal dyspnea. Respiratory:  Shortness of breath, coughing, sputum production, hemoptysis, wheezing, orthopnea.   Gastrointestinal:  Nausea, vomiting, diarrhea, heartburn, constipation, abdominal pain, hematemesis, hematochezia, melena, acholic stools  Genito-Urinary:  Dysuria, urgency, frequency, hematuria  Musculoskeletal:  Joint pain, joint stiffness, joint swelling, muscle painback pain  Neurology:  Headache, focal neurological deficits, weakness, numbness, paresthesia   Derm:  Rashes, ulcers, excoriations, bruising  Extremities:  Decreased ROM, peripheral edema, mottling      OBJECTIVE:    /64   Pulse 77   Temp 98.4 °F (36.9 °C) (Temporal)   Resp 17   Ht 5' 10\" (1.778 m)   Wt 206 lb 1.6 oz (93.5 kg)   SpO2 98%   BMI 29.57 kg/m²     General appearance:  awake, alert, and oriented to person, place, time, and purpose; appears stated age and cooperative; no apparent distress no labored breathing 4L NC  HEENT:  Conjunctivae/corneas clear. Neck: Supple. No jugular venous distention. Respiratory: symmetrical; clear to auscultation bilaterally; no wheezes; no rhonchi; no rales  Cardiovascular: rhythm regular; rate controlled; no murmurs post surgical sternal scar, dressed  Abdomen: Soft, nontender, nondistended  Extremities:  peripheral pulses present; no peripheral edema; no ulcers  Musculoskeletal: No clubbing, cyanosis, no bilateral lower extremity edema. Brisk capillary refill. Skin:  No rashes  on visible skin  Neurologic: awake, alert and following commands     ASSESSMENT and PLAN:  · NSTEMI- Aspirin, beta-blocker, statin. Echocardiogram 12/6 reviewed. Underwent cardiac catheterization 12/7 showing multivessel disease. CTS consulted, underwent CABG workup. 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 Then transitioned to ancef by ID. He admits to urination problems at home, Check PSA. ? Prostatitis  · Post operative anemia- Hemoglobin 8.8 this am, continue to monitor. Transfuse if <8.  · Hypertension, holding meds for now due to hypotension post surgery  · History of colorectal cancer status post resection, chemotherapy and radiation.   · COPD DISPOSITION: Continue current plan of care    Medications:  REVIEWED DAILY    Infusion Medications    sodium chloride 30 mL/hr (12/11/20 0000)    propofol Stopped (12/10/20 1550)    sodium chloride      norepinephrine Stopped (12/11/20 0100)    clevidipine      insulin      dextrose       Scheduled Medications    atorvastatin  40 mg Oral Nightly    sodium chloride flush  10 mL Intravenous 2 times per day    sennosides-docusate sodium  1 tablet Oral BID    pantoprazole  40 mg Oral Daily    chlorhexidine  15 mL Mouth/Throat BID    magnesium oxide  400 mg Oral Daily    mupirocin   Nasal BID    aspirin  81 mg Oral Daily    ipratropium-albuterol  1 ampule Inhalation Q4H WA    insulin glargine  0.15 Units/kg Subcutaneous Nightly    clopidogrel  75 mg Oral Daily    insulin lispro  0-3 Units Subcutaneous Q4H    insulin lispro  0-18 Units Subcutaneous Q4H    tamsulosin  0.4 mg Oral Daily    ceFAZolin  2 g Intravenous Q8H     PRN Meds: sodium chloride flush, potassium chloride, magnesium sulfate, acetaminophen, acetaminophen, oxyCODONE **OR** oxyCODONE, fentanNYL **OR** fentanNYL, magnesium hydroxide, ondansetron, propofol, albumin human, sodium chloride, norepinephrine, clevidipine, insulin, glucose, dextrose, glucagon (rDNA), dextrose, aluminum & magnesium hydroxide-simethicone, calcium gluconate IVPB    Labs:     Recent Labs     12/10/20  0418 12/10/20  1458 12/11/20  0505   WBC 10.3 16.5* 10.1   HGB 11.7* 9.3* 8.8*   HCT 36.1* 29.0* 27.1*    236 267       Recent Labs     12/10/20  0418  12/10/20  1413 12/10/20  1458 12/11/20  0505     --   --  136 142   K 3.9   < > 4.4 4.2 4.4     --   --  103 109*   CO2 21*  --   --  23 24   BUN 23  --   --  23 19   CREATININE 0.9  --   --  0.9 0.8   CALCIUM 9.4  --   --  7.9* 8.5*    < > = values in this interval not displayed.        Recent Labs     12/08/20  1013   PROT 7.4   ALKPHOS 158*   ALT 27   AST 20   BILITOT 0.7       Recent Labs 12/09/20  1017 12/10/20  1458   INR 1.4 1.5       No results for input(s): CKTOTAL, TROPONINI in the last 72 hours. Chronic labs:    Lab Results   Component Value Date    CHOL 165 12/07/2020    TRIG 85 12/07/2020    HDL 44 12/07/2020    LDLCALC 104 (H) 12/07/2020    INR 1.5 12/10/2020    LABA1C 5.6 12/08/2020       Radiology: REVIEWED DAILY    +++++++++++++++++++++++++++++++++++++++++++++++++  Stacey Ferguson Physician - 2020 [facility] [place]  +++++++++++++++++++++++++++++++++++++++++++++++++  NOTE: This report was transcribed using voice recognition software. Every effort was made to ensure accuracy; however, inadvertent computerized transcription errors may be present.

## 2020-12-12 ENCOUNTER — APPOINTMENT (OUTPATIENT)
Dept: GENERAL RADIOLOGY | Age: 75
DRG: 234 | End: 2020-12-12
Payer: MEDICARE

## 2020-12-12 LAB
ANION GAP SERPL CALCULATED.3IONS-SCNC: 9 MMOL/L (ref 7–16)
BUN BLDV-MCNC: 17 MG/DL (ref 8–23)
CALCIUM SERPL-MCNC: 8.9 MG/DL (ref 8.6–10.2)
CHLORIDE BLD-SCNC: 100 MMOL/L (ref 98–107)
CO2: 24 MMOL/L (ref 22–29)
CREAT SERPL-MCNC: 0.7 MG/DL (ref 0.7–1.2)
GFR AFRICAN AMERICAN: >60
GFR NON-AFRICAN AMERICAN: >60 ML/MIN/1.73
GLUCOSE BLD-MCNC: 134 MG/DL (ref 74–99)
HCT VFR BLD CALC: 26.2 % (ref 37–54)
HEMOGLOBIN: 8.5 G/DL (ref 12.5–16.5)
MCH RBC QN AUTO: 30.7 PG (ref 26–35)
MCHC RBC AUTO-ENTMCNC: 32.4 % (ref 32–34.5)
MCV RBC AUTO: 94.6 FL (ref 80–99.9)
METER GLUCOSE: 143 MG/DL (ref 74–99)
METER GLUCOSE: 149 MG/DL (ref 74–99)
METER GLUCOSE: 149 MG/DL (ref 74–99)
METER GLUCOSE: 163 MG/DL (ref 74–99)
PDW BLD-RTO: 12.7 FL (ref 11.5–15)
PLATELET # BLD: 246 E9/L (ref 130–450)
PMV BLD AUTO: 11.1 FL (ref 7–12)
POTASSIUM SERPL-SCNC: 4.1 MMOL/L (ref 3.5–5)
PROSTATE SPECIFIC ANTIGEN: 0.3 NG/ML (ref 0–4)
RBC # BLD: 2.77 E12/L (ref 3.8–5.8)
SODIUM BLD-SCNC: 133 MMOL/L (ref 132–146)
WBC # BLD: 12.1 E9/L (ref 4.5–11.5)

## 2020-12-12 PROCEDURE — 71045 X-RAY EXAM CHEST 1 VIEW: CPT

## 2020-12-12 PROCEDURE — 6370000000 HC RX 637 (ALT 250 FOR IP): Performed by: NURSE PRACTITIONER

## 2020-12-12 PROCEDURE — 2580000003 HC RX 258: Performed by: NURSE PRACTITIONER

## 2020-12-12 PROCEDURE — 6360000002 HC RX W HCPCS: Performed by: NURSE PRACTITIONER

## 2020-12-12 PROCEDURE — 6370000000 HC RX 637 (ALT 250 FOR IP): Performed by: PHYSICIAN ASSISTANT

## 2020-12-12 PROCEDURE — 99232 SBSQ HOSP IP/OBS MODERATE 35: CPT | Performed by: INTERNAL MEDICINE

## 2020-12-12 PROCEDURE — 84153 ASSAY OF PSA TOTAL: CPT

## 2020-12-12 PROCEDURE — 51798 US URINE CAPACITY MEASURE: CPT

## 2020-12-12 PROCEDURE — 80048 BASIC METABOLIC PNL TOTAL CA: CPT

## 2020-12-12 PROCEDURE — 94640 AIRWAY INHALATION TREATMENT: CPT

## 2020-12-12 PROCEDURE — 2140000000 HC CCU INTERMEDIATE R&B

## 2020-12-12 PROCEDURE — 2700000000 HC OXYGEN THERAPY PER DAY

## 2020-12-12 PROCEDURE — 36415 COLL VENOUS BLD VENIPUNCTURE: CPT

## 2020-12-12 PROCEDURE — 85027 COMPLETE CBC AUTOMATED: CPT

## 2020-12-12 PROCEDURE — 82962 GLUCOSE BLOOD TEST: CPT

## 2020-12-12 PROCEDURE — 51702 INSERT TEMP BLADDER CATH: CPT

## 2020-12-12 PROCEDURE — 93798 PHYS/QHP OP CAR RHAB W/ECG: CPT

## 2020-12-12 RX ADMIN — OXYCODONE HYDROCHLORIDE AND ACETAMINOPHEN 500 MG: 500 TABLET ORAL at 08:30

## 2020-12-12 RX ADMIN — TAMSULOSIN HYDROCHLORIDE 0.4 MG: 0.4 CAPSULE ORAL at 08:30

## 2020-12-12 RX ADMIN — Medication 2 G: at 18:43

## 2020-12-12 RX ADMIN — IPRATROPIUM BROMIDE AND ALBUTEROL SULFATE 1 AMPULE: .5; 3 SOLUTION RESPIRATORY (INHALATION) at 13:06

## 2020-12-12 RX ADMIN — MUPIROCIN: 20 OINTMENT TOPICAL at 08:30

## 2020-12-12 RX ADMIN — SODIUM CHLORIDE, PRESERVATIVE FREE 10 ML: 5 INJECTION INTRAVENOUS at 19:40

## 2020-12-12 RX ADMIN — ATORVASTATIN CALCIUM 40 MG: 40 TABLET, FILM COATED ORAL at 19:39

## 2020-12-12 RX ADMIN — ASPIRIN 81 MG: 81 TABLET, COATED ORAL at 08:31

## 2020-12-12 RX ADMIN — FERROUS SULFATE TAB 325 MG (65 MG ELEMENTAL FE) 325 MG: 325 (65 FE) TAB at 17:33

## 2020-12-12 RX ADMIN — MAGNESIUM GLUCONATE 500 MG ORAL TABLET 400 MG: 500 TABLET ORAL at 08:31

## 2020-12-12 RX ADMIN — HYDROCODONE BITARTRATE AND ACETAMINOPHEN 1 TABLET: 5; 325 TABLET ORAL at 18:58

## 2020-12-12 RX ADMIN — MAGNESIUM HYDROXIDE 30 ML: 2400 SUSPENSION ORAL at 08:30

## 2020-12-12 RX ADMIN — INSULIN LISPRO 1 UNITS: 100 INJECTION, SOLUTION INTRAVENOUS; SUBCUTANEOUS at 12:03

## 2020-12-12 RX ADMIN — METOPROLOL TARTRATE 25 MG: 25 TABLET, FILM COATED ORAL at 08:30

## 2020-12-12 RX ADMIN — CLOPIDOGREL 75 MG: 75 TABLET, FILM COATED ORAL at 08:31

## 2020-12-12 RX ADMIN — INSULIN LISPRO 1 UNITS: 100 INJECTION, SOLUTION INTRAVENOUS; SUBCUTANEOUS at 22:17

## 2020-12-12 RX ADMIN — IPRATROPIUM BROMIDE AND ALBUTEROL SULFATE 1 AMPULE: .5; 3 SOLUTION RESPIRATORY (INHALATION) at 09:08

## 2020-12-12 RX ADMIN — MUPIROCIN: 20 OINTMENT TOPICAL at 19:41

## 2020-12-12 RX ADMIN — SODIUM CHLORIDE, PRESERVATIVE FREE 10 ML: 5 INJECTION INTRAVENOUS at 18:43

## 2020-12-12 RX ADMIN — PANTOPRAZOLE SODIUM 40 MG: 40 TABLET, DELAYED RELEASE ORAL at 08:30

## 2020-12-12 RX ADMIN — Medication 2 G: at 03:45

## 2020-12-12 RX ADMIN — HYDROCODONE BITARTRATE AND ACETAMINOPHEN 2 TABLET: 5; 325 TABLET ORAL at 04:40

## 2020-12-12 RX ADMIN — METOPROLOL TARTRATE 25 MG: 25 TABLET, FILM COATED ORAL at 19:39

## 2020-12-12 RX ADMIN — OXYCODONE HYDROCHLORIDE AND ACETAMINOPHEN 500 MG: 500 TABLET ORAL at 19:39

## 2020-12-12 RX ADMIN — Medication 2 G: at 11:13

## 2020-12-12 RX ADMIN — IPRATROPIUM BROMIDE AND ALBUTEROL SULFATE 1 AMPULE: .5; 3 SOLUTION RESPIRATORY (INHALATION) at 16:34

## 2020-12-12 RX ADMIN — SODIUM CHLORIDE, PRESERVATIVE FREE 10 ML: 5 INJECTION INTRAVENOUS at 08:29

## 2020-12-12 RX ADMIN — IPRATROPIUM BROMIDE AND ALBUTEROL SULFATE 1 AMPULE: .5; 3 SOLUTION RESPIRATORY (INHALATION) at 20:00

## 2020-12-12 RX ADMIN — FERROUS SULFATE TAB 325 MG (65 MG ELEMENTAL FE) 325 MG: 325 (65 FE) TAB at 08:30

## 2020-12-12 RX ADMIN — INSULIN LISPRO 1 UNITS: 100 INJECTION, SOLUTION INTRAVENOUS; SUBCUTANEOUS at 17:33

## 2020-12-12 RX ADMIN — HYDROCODONE BITARTRATE AND ACETAMINOPHEN 2 TABLET: 5; 325 TABLET ORAL at 08:52

## 2020-12-12 RX ADMIN — DOCUSATE SODIUM 50 MG AND SENNOSIDES 8.6 MG 1 TABLET: 8.6; 5 TABLET, FILM COATED ORAL at 19:39

## 2020-12-12 RX ADMIN — FOLIC ACID 1 MG: 1 TABLET ORAL at 08:30

## 2020-12-12 RX ADMIN — DOCUSATE SODIUM 50 MG AND SENNOSIDES 8.6 MG 1 TABLET: 8.6; 5 TABLET, FILM COATED ORAL at 08:38

## 2020-12-12 RX ADMIN — SODIUM CHLORIDE, PRESERVATIVE FREE 10 ML: 5 INJECTION INTRAVENOUS at 11:13

## 2020-12-12 ASSESSMENT — PAIN SCALES - GENERAL
PAINLEVEL_OUTOF10: 7
PAINLEVEL_OUTOF10: 7
PAINLEVEL_OUTOF10: 3
PAINLEVEL_OUTOF10: 0

## 2020-12-12 ASSESSMENT — PAIN DESCRIPTION - PAIN TYPE: TYPE: ACUTE PAIN;SURGICAL PAIN

## 2020-12-12 ASSESSMENT — PAIN DESCRIPTION - ORIENTATION: ORIENTATION: MID

## 2020-12-12 ASSESSMENT — PAIN DESCRIPTION - LOCATION: LOCATION: CHEST

## 2020-12-12 ASSESSMENT — PAIN DESCRIPTION - DESCRIPTORS: DESCRIPTORS: DISCOMFORT

## 2020-12-12 ASSESSMENT — PAIN DESCRIPTION - FREQUENCY: FREQUENCY: INTERMITTENT

## 2020-12-12 ASSESSMENT — PAIN DESCRIPTION - ONSET: ONSET: AWAKENED FROM SLEEP

## 2020-12-12 ASSESSMENT — PAIN DESCRIPTION - PROGRESSION: CLINICAL_PROGRESSION: NOT CHANGED

## 2020-12-12 ASSESSMENT — PAIN - FUNCTIONAL ASSESSMENT: PAIN_FUNCTIONAL_ASSESSMENT: PREVENTS OR INTERFERES SOME ACTIVE ACTIVITIES AND ADLS

## 2020-12-12 NOTE — PROGRESS NOTES
[address] [place] [address] [facility]  [facility]  Progress Note      Chief Complaint   Patient presents with    Chest Pain     Pt complaining of midsternal chest pain. Chest pain has gotten better at this time. Pt only took aspirin. SUBJECTIVE:  Patient is tolerating medications. No reported adverse drug reactions. No nausea, vomiting, diarrhea. Patient is awake alert up in chair. Doing okay  Afebrile on 4 L 96% sat      Review of systems:  As stated above in the chief complaint, otherwise negative. Medications:  Scheduled Meds:   metoprolol tartrate  25 mg Oral BID    aspirin  81 mg Oral Daily    sennosides-docusate sodium  1 tablet Oral BID    ferrous sulfate  325 mg Oral BID WC    vitamin C  500 mg Oral BID    folic acid  1 mg Oral Daily    insulin lispro  0-6 Units Subcutaneous TID WC    insulin lispro  0-3 Units Subcutaneous Nightly    pantoprazole  40 mg Oral Daily    atorvastatin  40 mg Oral Nightly    sodium chloride flush  10 mL Intravenous 2 times per day    magnesium oxide  400 mg Oral Daily    mupirocin   Nasal BID    ipratropium-albuterol  1 ampule Inhalation Q4H WA    clopidogrel  75 mg Oral Daily    tamsulosin  0.4 mg Oral Daily    ceFAZolin  2 g Intravenous Q8H     Continuous Infusions:   dextrose       PRN Meds:acetaminophen, HYDROcodone 5 mg - acetaminophen **OR** HYDROcodone 5 mg - acetaminophen, magnesium hydroxide, potassium chloride, ondansetron, bisacodyl, sodium chloride flush, glucose, dextrose, glucagon (rDNA), dextrose    OBJECTIVE:  /60   Pulse 81   Temp 97 °F (36.1 °C) (Temporal)   Resp 18   Ht 5' 10\" (1.778 m)   Wt 202 lb (91.6 kg)   SpO2 96%   BMI 28.98 kg/m²   Temp  Av.5 °F (36.9 °C)  Min: 97 °F (36.1 °C)  Max: 99.5 °F (37.5 °C)  Constitutional: The patient is awake, alert, and oriented. Skin: Warm and dry. No rashes were noted. HEENT: Round and reactive pupils. Moist mucous membranes. No ulcerations or thrush.   Neck: Supple to movements. Chest: No use of accessory muscles to breathe. Symmetrical expansion. No wheezing, crackles or rhonchi. Mediastinal and lateral chest tube removed,   Cardiovascular: S1 and S2 are rhythmic and regular. No murmurs appreciated. Abdomen: Positive bowel sounds to auscultation. Benign to palpation. No masses felt. No hepatosplenomegaly. Genitourinary: Male   Extremities: No clubbing, no cyanosis, no edema.   Lines: peripheral  Laboratory and Tests Review:  Lab Results   Component Value Date    WBC 12.1 (H) 12/12/2020    WBC 10.1 12/11/2020    WBC 16.5 (H) 12/10/2020    HGB 8.5 (L) 12/12/2020    HCT 26.2 (L) 12/12/2020    MCV 94.6 12/12/2020     12/12/2020     Lab Results   Component Value Date    NEUTROABS 10.27 (H) 12/06/2020     No results found for: [facility]  Lab Results   Component Value Date    ALT 27 12/08/2020    AST 20 12/08/2020    ALKPHOS 158 (H) 12/08/2020    BILITOT 0.7 12/08/2020     Lab Results   Component Value Date     12/12/2020    K 4.1 12/12/2020    K 3.9 12/07/2020     12/12/2020    CO2 24 12/12/2020    BUN 17 12/12/2020    CREATININE 0.7 12/12/2020    CREATININE 0.8 12/11/2020    CREATININE 0.9 12/10/2020    GFRAA >60 12/12/2020    LABGLOM >60 12/12/2020    GLUCOSE 134 12/12/2020    PROT 7.4 12/08/2020    LABALBU 3.7 12/08/2020    CALCIUM 8.9 12/12/2020    BILITOT 0.7 12/08/2020    ALKPHOS 158 12/08/2020    AST 20 12/08/2020    ALT 27 12/08/2020     No results found for: CRP  No results found for: Zulema Guerrero  Radiology:      Microbiology:   Lab Results   Component Value Date    ORG Strep agalactiae (Beta Strep Group B) 12/08/2020     Lab Results   Component Value Date    ORG Strep agalactiae (Beta Strep Group B) 12/08/2020     No results found for: WNDABS  No results found for: RESPSMEAR  No results found for: MPNEUMO, CLAMYDCU, LABLEGI, AFBCX, FUNGSM, LABFUNG  No results found for: CULTRESP  No results found for: CXCATHTIP  No results found for: BFCS  No results found for: CXSURG  Urine Culture, Routine   Date Value Ref Range Status   12/08/2020 <10,000 CFU/mL  Gram positive organism   (A)  Final   12/08/2020 >100,000 CFU/ml  Final     MRSA Culture Only   Date Value Ref Range Status   12/08/2020 Methicillin resistant Staph aureus not isolated  Final       ASSESSMENT:  · Cystitis with group B strep rule out prostatitis    PLAN:  · Continue Ancef for now  · Urine grp b strep  · Urology work-up is going to be necessary at some point  · Cheney is out check a bladder scan tonight, did not void and was straight cathed, may need cheney repalced  · Check final cultures  · Monitor labs    Electronically signed by ARGENTINA Reynoso CNP on 12/12/2020 at 9:40 AM   Pt seen and examined. Above discussed agree with advanced practice nurse. Labs, cultures, and radiographs reviewed. Face to Face encounter occurred. Changes made as necessary.      Cristiane Park MD

## 2020-12-12 NOTE — PROGRESS NOTES
INPATIENT CARDIOLOGY FOLLOW-UP    Name: Carmel Richmond    Age: 70 y.o. Date of Admission: 12/6/2020 10:08 PM    Date of Service: 12/12/2020    Chief Complaint: Follow-up for coronary atherosclerosis, non-ST elevation myocardial infarction, hypertension    Interim History: The patient present remains clinically stable from a cardiovascular standpoint with no perioperative cardiovascular complications remains hemodynamically stable and normotensive with a low-grade fever earlier noted. Review of Systems: The remainder of a complete multisystem review including consitutional, central nervous, respiratory, circulatory, gastrointestinal, genitourinary, endocrinologic, hematologic, musculoskeletal and psychiatric are negative.     Problem List:  Patient Active Problem List   Diagnosis    Chest pain    Anemia    Leukocytosis    HTN (hypertension)    History of rectal cancer    NSTEMI (non-ST elevated myocardial infarction) ([facility] [address]       Allergies:  No Known Allergies    Current Medications:  Current Facility-Administered Medications   Medication Dose Route Frequency Provider Last Rate Last Admin    metoprolol tartrate (LOPRESSOR) tablet 25 mg  25 mg Oral BID EDUARDO Abdi   25 mg at 12/12/20 0830    acetaminophen (TYLENOL) tablet 650 mg  650 mg Oral Q4H PRN Bayron Reading, APRN - CNP        HYDROcodone-acetaminophen (NORCO) 5-325 MG per tablet 1 tablet  1 tablet Oral Q4H PRN Bayron Reading, APRN - CNP        Or    HYDROcodone-acetaminophen (NORCO) 5-325 MG per tablet 2 tablet  2 tablet Oral Q4H PRN Bayron Reading, APRN - CNP   2 tablet at 12/12/20 4439    magnesium hydroxide (MILK OF MAGNESIA) 400 MG/5ML suspension 30 mL  30 mL Oral Daily PRN Bayron Reading, APRN - CNP   30 mL at 12/12/20 0830    aspirin EC tablet 81 mg  81 mg Oral Daily Bayron Reading, APRN - CNP   81 mg at 12/12/20 0831    sennosides-docusate sodium (SENOKOT-S) 8.6-50 MG tablet 1 tablet  1 tablet Oral BID Bayron Reading, APRN - CNP   1 tablet at 12/12/20 0838    ferrous sulfate (IRON 325) tablet 325 mg  325 mg Oral BID  Alvarez Canadian, APRN - CNP   325 mg at 12/12/20 0830    vitamin C (ASCORBIC ACID) tablet 500 mg  500 mg Oral BID Antonio Garrett, APRN - CNP   500 mg at 87/67/35 9182    folic acid (FOLVITE) tablet 1 mg  1 mg Oral Daily Antonio Garrett, APRN - CNP   1 mg at 12/12/20 0830    potassium chloride (KLOR-CON M) extended release tablet 20 mEq  20 mEq Oral PRN Antonio Garrett, APRN - CNP        ondansetron [facility]) injection 4 mg  4 mg Intravenous Q8H PRN Antonio Garrett, APRN - CNP        insulin lispro (HUMALOG) injection vial 0-6 Units  0-6 Units Subcutaneous TID  Antonio Garrett, APRN - CNP        insulin lispro (HUMALOG) injection vial 0-3 Units  0-3 Units Subcutaneous Nightly Antonio Garrett, APRN - CNP   1 Units at 12/11/20 2125    pantoprazole (PROTONIX) tablet 40 mg  40 mg Oral Daily Antonio Garrett, APRN - CNP   40 mg at 12/12/20 0830    bisacodyl (DULCOLAX) suppository 10 mg  10 mg Rectal Daily PRN Antonio Garrett, APRN - CNP        atorvastatin (LIPITOR) tablet 40 mg  40 mg Oral Nightly Antonio Garrett, APRN - CNP   40 mg at 12/11/20 2119    sodium chloride flush 0.9 % injection 10 mL  10 mL Intravenous 2 times per day Antonio Garrett, APRN - CNP   10 mL at 12/12/20 0829    sodium chloride flush 0.9 % injection 10 mL  10 mL Intravenous PRN Antonio Garrett, APRN - CNP        magnesium oxide (MAG-OX) tablet 400 mg  400 mg Oral Daily Antonio Calverther, APRN - CNP   400 mg at 12/12/20 0831    mupirocin (BACTROBAN) 2 % ointment   Nasal BID Antonio Calverther, APRN - CNP   Given at 12/12/20 0830    ipratropium-albuterol (DUONEB) nebulizer solution 1 ampule  1 ampule Inhalation Q4H WA Antonio Garrett, APRN - CNP   1 ampule at 12/12/20 0908    glucose (GLUTOSE) 40 % oral gel 15 g  15 g Oral PRN Antonio Canadian, APRN - CNP        dextrose 50 % IV solution  12.5 g Intravenous PRN Antonio Garrett, APRN - BUN 17 12/12/2020     12/12/2020    K 4.1 12/12/2020     12/12/2020    CO2 24 12/12/2020     No results for input(s): CKTOTAL, CKMB in the last 72 hours. Invalid input(s): TROPONONI  No results found for: BNP  Lab Results   Component Value Date    WBC 12.1 12/12/2020    RBC 2.77 12/12/2020    HGB 8.5 12/12/2020    HCT 26.2 12/12/2020    MCV 94.6 12/12/2020    MCH 30.7 12/12/2020    MCHC 32.4 12/12/2020    RDW 12.7 12/12/2020     12/12/2020    MPV 11.1 12/12/2020     No results for input(s): ALKPHOS, ALT, AST, PROT, BILITOT, BILIDIR, LABALBU in the last 72 hours. Lab Results   Component Value Date    MG 2.9 12/10/2020     Lab Results   Component Value Date    PROTIME 17.6 12/10/2020    INR 1.5 12/10/2020     No results found for: TSH  No components found for: CHLPL  Lab Results   Component Value Date    TRIG 85 12/07/2020     Lab Results   Component Value Date    HDL 44 12/07/2020     Lab Results   Component Value Date    LDLCALC 104 (H) 12/07/2020       Cardiac Tests:  Telemetry findings reviewed: sinus rhythm, no new tachy/bradyarrhythmias overnight      ASSESSMENT / PLAN: On a clinical basis, the patient remains stable from a cardiovascular standpoint following his surgical revascularization. Progressive ambulation and rehabilitation persist and he appears nearing stabilization for discharge. Continued appropriate lifestyle modification to achieve weight reduction accompanied by aggressive risk factor modification of blood pressure and serum lipids will be necessary to reduce risk of future atherosclerotic development. He will likely benefit from cardiovascular rehabilitation following discharge. We will further evaluate him during hospitalization should additional cardiovascular difficulties or concerns arise with arrangements made for outpatient cardiovascular follow-up in approximately 6 weeks. Note: This report was completed utilizing computer voice recognition software.  Every effort has been made to ensure accuracy, however; inadvertent computerized transcription errors may be present. Edelmira Curtis.  Zara Newton, [address] [facility]

## 2020-12-12 NOTE — PROGRESS NOTES
Hospitalist Progress Note      SYNOPSIS: Patient admitted on 2020 with past medical history of colorectal cancer status post colectomy in , chemotherapy and radiation, hypertension and former tobacco use. Patient was in his usual state of health until around 7 PM when he started having left-sided chest pain. He just got done eating ice cream when the pain started. Pain was described as achy, \"someone pushing on my chest\", 5 out of 10, nonradiating, lasted about 15 to 20 minutes, associated with lightheadedness and diaphoresis. Got some relief after taking aspirin. Underwent heart catheterization  showing multivessel disease. CTS consulted. Underwent CABG x3 10/12/20    SUBJECTIVE:  Stable overnight. No other overnight issues reported. Patient seen and examined  Records reviewed. No acute complaints  Doing well post surgery  4L NC  Issues with urination    Temp (24hrs), Av.5 °F (36.9 °C), Min:97 °F (36.1 °C), Max:99.5 °F (37.5 °C)    DIET: DIET CARDIAC; Carb Control: 5 carb choices (75 gms)/meal  Dietary Nutrition Supplements: Diabetic Oral Supplement  Dietary Nutrition Supplements: Wound Healing Oral Supplement  CODE: Full Code    Intake/Output Summary (Last 24 hours) at 2020 0845  Last data filed at 2020 5813  Gross per 24 hour   Intake 1630 ml   Output 1125 ml   Net 505 ml       Review of Systems  All bolded are positive; please see HPI  General:  Fever, chills, diaphoresis, fatigue, malaise, night sweats, weight loss  Psychological:  Anxiety, disorientation, hallucinations. ENT:  Epistaxis, headaches, vertigo, visual changes. Cardiovascular:  Chest pain, improving now post surg, irregular heartbeats, palpitations, paroxysmal nocturnal dyspnea. Respiratory:  Shortness of breath, coughing, sputum production, hemoptysis, wheezing, orthopnea.   Gastrointestinal:  Nausea, vomiting, diarrhea, heartburn, constipation, abdominal pain, hematemesis, hematochezia, melena, acholic stools  Genito-Urinary:  Dysuria, urgency, frequency, hematuria  Musculoskeletal:  Joint pain, joint stiffness, joint swelling, muscle painback pain  Neurology:  Headache, focal neurological deficits, weakness, numbness, paresthesia   Derm:  Rashes, ulcers, excoriations, bruising  Extremities:  Decreased ROM, peripheral edema, mottling      OBJECTIVE:    /60   Pulse 81   Temp 97 °F (36.1 °C) (Temporal)   Resp 18   Ht 5' 10\" (1.778 m)   Wt 202 lb (91.6 kg)   SpO2 95%   BMI 28.98 kg/m²     General appearance:  awake, alert, and oriented to person, place, time, and purpose; appears stated age and cooperative; no apparent distress no labored breathing 4L NC  HEENT:  Conjunctivae/corneas clear. Neck: Supple. No jugular venous distention. Respiratory: symmetrical; coarse; no wheezes; no rhonchi; no rales  Cardiovascular: rhythm regular; rate controlled; no murmurs post surgical sternal scar, dressed  Abdomen: Soft, nontender, nondistended  Extremities:  peripheral pulses present; no peripheral edema; no ulcers  Musculoskeletal: No clubbing, cyanosis, no bilateral lower extremity edema. Brisk capillary refill. Skin:  No rashes  on visible skin  Neurologic: awake, alert and following commands     ASSESSMENT and PLAN:  · NSTEMI- Aspirin, beta-blocker, statin. Echocardiogram 12/6 reviewed. Underwent cardiac catheterization 12/7 showing multivessel disease. CTS consulted, underwent CABG workup. 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 Then transitioned to ancef by ID. He admits to urination problems at home,  PSA 0.3. Urology following. To follow up with urlogy outpatient for KERA. Had to be straight cathed twice In last 12 horus  · Post operative anemia- Hemoglobin 8.5 this am, continue to monitor.  Transfuse if <8.  · Hypertension, holding meds for now due to hypotension post surgery  · History of colorectal cancer status post resection, chemotherapy and radiation. · COPD          DISPOSITION: Continue current plan of care    Medications:  REVIEWED DAILY    Infusion Medications    dextrose       Scheduled Medications    metoprolol tartrate  25 mg Oral BID    aspirin  81 mg Oral Daily    sennosides-docusate sodium  1 tablet Oral BID    ferrous sulfate  325 mg Oral BID WC    vitamin C  500 mg Oral BID    folic acid  1 mg Oral Daily    insulin lispro  0-6 Units Subcutaneous TID WC    insulin lispro  0-3 Units Subcutaneous Nightly    pantoprazole  40 mg Oral Daily    atorvastatin  40 mg Oral Nightly    sodium chloride flush  10 mL Intravenous 2 times per day    magnesium oxide  400 mg Oral Daily    mupirocin   Nasal BID    ipratropium-albuterol  1 ampule Inhalation Q4H WA    clopidogrel  75 mg Oral Daily    tamsulosin  0.4 mg Oral Daily    ceFAZolin  2 g Intravenous Q8H     PRN Meds: acetaminophen, HYDROcodone 5 mg - acetaminophen **OR** HYDROcodone 5 mg - acetaminophen, magnesium hydroxide, potassium chloride, ondansetron, bisacodyl, sodium chloride flush, glucose, dextrose, glucagon (rDNA), dextrose    Labs:     Recent Labs     12/10/20  1458 12/11/20  0505 12/12/20  0633   WBC 16.5* 10.1 12.1*   HGB 9.3* 8.8* 8.5*   HCT 29.0* 27.1* 26.2*    267 246       Recent Labs     12/10/20  1458 12/11/20  0505 12/11/20  2117 12/12/20  0633    142  --  133   K 4.2 4.4 4.1 4.1    109*  --  100   CO2 23 24  --  24   BUN 23 19  --  17   CREATININE 0.9 0.8  --  0.7   CALCIUM 7.9* 8.5*  --  8.9       No results for input(s): PROT, ALB, ALKPHOS, ALT, AST, BILITOT, AMYLASE, LIPASE in the last 72 hours. Recent Labs     12/09/20  1017 12/10/20  1458   INR 1.4 1.5       No results for input(s): Real Clap in the last 72 hours.     Chronic labs:    Lab Results   Component Value Date    CHOL 165 12/07/2020    TRIG 85 12/07/2020    HDL 44 12/07/2020    LDLCALC 104 (H) 12/07/2020    INR 1.5 12/10/2020    LABA1C 5.6 12/08/2020       Radiology: REVIEWED DAILY    +++++++++++++++++++++++++++++++++++++++++++++++++  [address], [place]  +++++++++++++++++++++++++++++++++++++++++++++++++  NOTE: This report was transcribed using voice recognition software. Every effort was made to ensure accuracy; however, inadvertent computerized transcription errors may be present.

## 2020-12-12 NOTE — CONSULTS
12/12/2020 8:14 AM  Service: Urology  Group: [facility] ([facility])    Erum Rowe  [id number]     Chief Complaint: Prostatitis     History of Present Illness: The patient is a 70 y.o. male patient who presents with the chest pain  The patient has not seen a urologist  He did have an ID consult  He was found to have cystitis and prostatitis with Group B strep growing  He has not seen a urologist in the past  He has not had a cysto  He is voiding  He did have a cheney in and and has been removed yesterday  He was bladder scanned yesterday evening for 437cc and straight cathed  He was straight cathed this AM  Admits to LUTS prior to admission, nocuturia 2-3x, weak stream, post void dribbling  He has not had a TURP in the past  No  medications in the past     History reviewed. No pertinent past medical history. Past Surgical History:   Procedure Laterality Date    CORONARY ARTERY BYPASS GRAFT N/A 12/10/2020    CORONARY ARTERY BYPASS, BELINDA performed by Refugio Diaz, DO at [facility]       Medications Prior to Admission:    Medications Prior to Admission: amLODIPine (NORVASC) 5 MG tablet, Take 5 mg by mouth daily    Allergies:    Patient has no known allergies. Social History:    reports that he has quit smoking. He has never used smokeless tobacco. He reports previous alcohol use. He reports that he does not use drugs. Family History:   Non-contributory to this urological problem  family history is not on file.     Review of Systems:  Respiratory: negative for cough and hemoptysis  Cardiovascular: positive for chest tenderness  Gastrointestinal: negative for abdominal pain, diarrhea, nausea and vomiting  Derm: negative for rash and skin lesion(s)  Neurological: negative for seizures and tremors  Endocrine: negative for diabetic symptoms including polydipsia and polyuria  : As above in the HPI, otherwise negative  All other reviews are negative    Physical Exam:   Vitals: /60   Pulse 81

## 2020-12-12 NOTE — PROGRESS NOTES
Spoke with Dr. Michelle Elizabeth, received orders to straight cath & attempt another void trial. If still no void, straight cath again & attempt another void trial, if still no void after 2 straight cath/void trial attempts then place Estrada. Pt straight cathed at this time for 300 cc dark brown urine with large amount sediment & foul odor. Pt tolerated well.

## 2020-12-12 NOTE — PROGRESS NOTES
No void noted since cheney removed. Pt bladder scanned for 437cc. Dr. Arnel Mcgrath paged via answering service.

## 2020-12-13 LAB
ANION GAP SERPL CALCULATED.3IONS-SCNC: 10 MMOL/L (ref 7–16)
BLOOD BANK DISPENSE STATUS: NORMAL
BLOOD BANK PRODUCT CODE: NORMAL
BPU ID: NORMAL
BUN BLDV-MCNC: 18 MG/DL (ref 8–23)
CALCIUM SERPL-MCNC: 9 MG/DL (ref 8.6–10.2)
CHLORIDE BLD-SCNC: 99 MMOL/L (ref 98–107)
CO2: 26 MMOL/L (ref 22–29)
CREAT SERPL-MCNC: 0.7 MG/DL (ref 0.7–1.2)
DESCRIPTION BLOOD BANK: NORMAL
GFR AFRICAN AMERICAN: >60
GFR NON-AFRICAN AMERICAN: >60 ML/MIN/1.73
GLUCOSE BLD-MCNC: 129 MG/DL (ref 74–99)
HCT VFR BLD CALC: 27.8 % (ref 37–54)
HEMOGLOBIN: 8.7 G/DL (ref 12.5–16.5)
MCH RBC QN AUTO: 30.3 PG (ref 26–35)
MCHC RBC AUTO-ENTMCNC: 31.3 % (ref 32–34.5)
MCV RBC AUTO: 96.9 FL (ref 80–99.9)
METER GLUCOSE: 111 MG/DL (ref 74–99)
METER GLUCOSE: 132 MG/DL (ref 74–99)
METER GLUCOSE: 136 MG/DL (ref 74–99)
METER GLUCOSE: 137 MG/DL (ref 74–99)
PDW BLD-RTO: 12.5 FL (ref 11.5–15)
PLATELET # BLD: 268 E9/L (ref 130–450)
PMV BLD AUTO: 10.8 FL (ref 7–12)
POTASSIUM SERPL-SCNC: 4 MMOL/L (ref 3.5–5)
RBC # BLD: 2.87 E12/L (ref 3.8–5.8)
SODIUM BLD-SCNC: 135 MMOL/L (ref 132–146)
WBC # BLD: 12.6 E9/L (ref 4.5–11.5)

## 2020-12-13 PROCEDURE — 6370000000 HC RX 637 (ALT 250 FOR IP): Performed by: NURSE PRACTITIONER

## 2020-12-13 PROCEDURE — 51702 INSERT TEMP BLADDER CATH: CPT

## 2020-12-13 PROCEDURE — 82962 GLUCOSE BLOOD TEST: CPT

## 2020-12-13 PROCEDURE — 2580000003 HC RX 258: Performed by: NURSE PRACTITIONER

## 2020-12-13 PROCEDURE — 2140000000 HC CCU INTERMEDIATE R&B

## 2020-12-13 PROCEDURE — 80048 BASIC METABOLIC PNL TOTAL CA: CPT

## 2020-12-13 PROCEDURE — 36415 COLL VENOUS BLD VENIPUNCTURE: CPT

## 2020-12-13 PROCEDURE — 2700000000 HC OXYGEN THERAPY PER DAY

## 2020-12-13 PROCEDURE — 6360000002 HC RX W HCPCS: Performed by: NURSE PRACTITIONER

## 2020-12-13 PROCEDURE — 85027 COMPLETE CBC AUTOMATED: CPT

## 2020-12-13 PROCEDURE — 94640 AIRWAY INHALATION TREATMENT: CPT

## 2020-12-13 PROCEDURE — 6370000000 HC RX 637 (ALT 250 FOR IP): Performed by: PHYSICIAN ASSISTANT

## 2020-12-13 PROCEDURE — 93798 PHYS/QHP OP CAR RHAB W/ECG: CPT

## 2020-12-13 RX ADMIN — PANTOPRAZOLE SODIUM 40 MG: 40 TABLET, DELAYED RELEASE ORAL at 09:25

## 2020-12-13 RX ADMIN — DOCUSATE SODIUM 50 MG AND SENNOSIDES 8.6 MG 1 TABLET: 8.6; 5 TABLET, FILM COATED ORAL at 22:00

## 2020-12-13 RX ADMIN — IPRATROPIUM BROMIDE AND ALBUTEROL SULFATE 1 AMPULE: .5; 3 SOLUTION RESPIRATORY (INHALATION) at 21:06

## 2020-12-13 RX ADMIN — ATORVASTATIN CALCIUM 40 MG: 40 TABLET, FILM COATED ORAL at 21:59

## 2020-12-13 RX ADMIN — SODIUM CHLORIDE, PRESERVATIVE FREE 10 ML: 5 INJECTION INTRAVENOUS at 09:24

## 2020-12-13 RX ADMIN — TAMSULOSIN HYDROCHLORIDE 0.4 MG: 0.4 CAPSULE ORAL at 09:25

## 2020-12-13 RX ADMIN — HYDROCODONE BITARTRATE AND ACETAMINOPHEN 2 TABLET: 5; 325 TABLET ORAL at 13:34

## 2020-12-13 RX ADMIN — METOPROLOL TARTRATE 25 MG: 25 TABLET, FILM COATED ORAL at 09:25

## 2020-12-13 RX ADMIN — IPRATROPIUM BROMIDE AND ALBUTEROL SULFATE 1 AMPULE: .5; 3 SOLUTION RESPIRATORY (INHALATION) at 12:32

## 2020-12-13 RX ADMIN — HYDROCODONE BITARTRATE AND ACETAMINOPHEN 2 TABLET: 5; 325 TABLET ORAL at 09:30

## 2020-12-13 RX ADMIN — FERROUS SULFATE TAB 325 MG (65 MG ELEMENTAL FE) 325 MG: 325 (65 FE) TAB at 18:07

## 2020-12-13 RX ADMIN — IPRATROPIUM BROMIDE AND ALBUTEROL SULFATE 1 AMPULE: .5; 3 SOLUTION RESPIRATORY (INHALATION) at 16:26

## 2020-12-13 RX ADMIN — IPRATROPIUM BROMIDE AND ALBUTEROL SULFATE 1 AMPULE: .5; 3 SOLUTION RESPIRATORY (INHALATION) at 08:43

## 2020-12-13 RX ADMIN — MUPIROCIN: 20 OINTMENT TOPICAL at 09:24

## 2020-12-13 RX ADMIN — OXYCODONE HYDROCHLORIDE AND ACETAMINOPHEN 500 MG: 500 TABLET ORAL at 22:00

## 2020-12-13 RX ADMIN — CLOPIDOGREL 75 MG: 75 TABLET, FILM COATED ORAL at 09:24

## 2020-12-13 RX ADMIN — MAGNESIUM GLUCONATE 500 MG ORAL TABLET 400 MG: 500 TABLET ORAL at 09:24

## 2020-12-13 RX ADMIN — SODIUM CHLORIDE, PRESERVATIVE FREE 10 ML: 5 INJECTION INTRAVENOUS at 18:07

## 2020-12-13 RX ADMIN — Medication 2 G: at 18:07

## 2020-12-13 RX ADMIN — MAGNESIUM HYDROXIDE 30 ML: 2400 SUSPENSION ORAL at 09:30

## 2020-12-13 RX ADMIN — OXYCODONE HYDROCHLORIDE AND ACETAMINOPHEN 500 MG: 500 TABLET ORAL at 09:24

## 2020-12-13 RX ADMIN — ASPIRIN 81 MG: 81 TABLET, COATED ORAL at 09:24

## 2020-12-13 RX ADMIN — DOCUSATE SODIUM 50 MG AND SENNOSIDES 8.6 MG 1 TABLET: 8.6; 5 TABLET, FILM COATED ORAL at 09:25

## 2020-12-13 RX ADMIN — FERROUS SULFATE TAB 325 MG (65 MG ELEMENTAL FE) 325 MG: 325 (65 FE) TAB at 09:25

## 2020-12-13 RX ADMIN — HYDROCODONE BITARTRATE AND ACETAMINOPHEN 2 TABLET: 5; 325 TABLET ORAL at 18:09

## 2020-12-13 RX ADMIN — Medication 2 G: at 04:00

## 2020-12-13 RX ADMIN — FOLIC ACID 1 MG: 1 TABLET ORAL at 09:25

## 2020-12-13 RX ADMIN — MUPIROCIN: 20 OINTMENT TOPICAL at 22:00

## 2020-12-13 RX ADMIN — SODIUM CHLORIDE, PRESERVATIVE FREE 10 ML: 5 INJECTION INTRAVENOUS at 22:05

## 2020-12-13 RX ADMIN — METOPROLOL TARTRATE 25 MG: 25 TABLET, FILM COATED ORAL at 22:00

## 2020-12-13 RX ADMIN — Medication 2 G: at 09:52

## 2020-12-13 ASSESSMENT — PAIN SCALES - GENERAL
PAINLEVEL_OUTOF10: 0
PAINLEVEL_OUTOF10: 0
PAINLEVEL_OUTOF10: 10
PAINLEVEL_OUTOF10: 0
PAINLEVEL_OUTOF10: 7
PAINLEVEL_OUTOF10: 0
PAINLEVEL_OUTOF10: 0
PAINLEVEL_OUTOF10: 9

## 2020-12-13 ASSESSMENT — PAIN DESCRIPTION - ONSET: ONSET: ON-GOING

## 2020-12-13 ASSESSMENT — PAIN DESCRIPTION - ORIENTATION: ORIENTATION: RIGHT;MID

## 2020-12-13 ASSESSMENT — PAIN DESCRIPTION - FREQUENCY: FREQUENCY: INTERMITTENT

## 2020-12-13 ASSESSMENT — PAIN DESCRIPTION - PROGRESSION: CLINICAL_PROGRESSION: NOT CHANGED

## 2020-12-13 ASSESSMENT — PAIN DESCRIPTION - PAIN TYPE: TYPE: ACUTE PAIN;SURGICAL PAIN

## 2020-12-13 ASSESSMENT — PAIN - FUNCTIONAL ASSESSMENT: PAIN_FUNCTIONAL_ASSESSMENT: PREVENTS OR INTERFERES SOME ACTIVE ACTIVITIES AND ADLS

## 2020-12-13 ASSESSMENT — PAIN DESCRIPTION - DESCRIPTORS: DESCRIPTORS: ACHING;DISCOMFORT;SORE

## 2020-12-13 ASSESSMENT — PAIN DESCRIPTION - LOCATION: LOCATION: CHEST

## 2020-12-13 NOTE — PROGRESS NOTES
[address] [place] [address] [facility]  [facility]  Progress Note      Chief Complaint   Patient presents with    Chest Pain     Pt complaining of midsternal chest pain. Chest pain has gotten better at this time. Pt only took aspirin. SUBJECTIVE:  Patient is tolerating medications. No reported adverse drug reactions. No nausea, vomiting, diarrhea. Patient is awake alert up in chair. Estrada had to be reinserted  Afebrile on room air 96% sat      Review of systems:  As stated above in the chief complaint, otherwise negative. Medications:  Scheduled Meds:   metoprolol tartrate  25 mg Oral BID    aspirin  81 mg Oral Daily    sennosides-docusate sodium  1 tablet Oral BID    ferrous sulfate  325 mg Oral BID WC    vitamin C  500 mg Oral BID    folic acid  1 mg Oral Daily    insulin lispro  0-6 Units Subcutaneous TID WC    insulin lispro  0-3 Units Subcutaneous Nightly    pantoprazole  40 mg Oral Daily    atorvastatin  40 mg Oral Nightly    sodium chloride flush  10 mL Intravenous 2 times per day    magnesium oxide  400 mg Oral Daily    mupirocin   Nasal BID    ipratropium-albuterol  1 ampule Inhalation Q4H WA    clopidogrel  75 mg Oral Daily    tamsulosin  0.4 mg Oral Daily    ceFAZolin  2 g Intravenous Q8H     Continuous Infusions:   dextrose       PRN Meds:acetaminophen, HYDROcodone 5 mg - acetaminophen **OR** HYDROcodone 5 mg - acetaminophen, magnesium hydroxide, potassium chloride, ondansetron, bisacodyl, sodium chloride flush, glucose, dextrose, glucagon (rDNA), dextrose    OBJECTIVE:  BP (!) 101/57   Pulse 92   Temp 97.6 °F (36.4 °C) (Temporal)   Resp 20   Ht 5' 10\" (1.778 m)   Wt 201 lb (91.2 kg)   SpO2 96%   BMI 28.84 kg/m²   Temp  Av.4 °F (36.3 °C)  Min: 96.8 °F (36 °C)  Max: 98.2 °F (36.8 °C)  Constitutional: The patient is awake, alert, and oriented. Skin: Warm and dry. No rashes were noted. HEENT: Round and reactive pupils. Moist mucous membranes.   No ulcerations or thrush. Neck: Supple to movements. Chest: No use of accessory muscles to breathe. Symmetrical expansion. No wheezing, crackles or rhonchi. Mediastinal and lateral chest tube removed,   Cardiovascular: S1 and S2 are rhythmic and regular. No murmurs appreciated. Abdomen: Positive bowel sounds to auscultation. Benign to palpation. No masses felt. No hepatosplenomegaly. Genitourinary: Male   Extremities: No clubbing, no cyanosis, no edema.   Lines: peripheral  Estrada with hematuria  Laboratory and Tests Review:  Lab Results   Component Value Date    WBC 12.1 (H) 12/12/2020    WBC 10.1 12/11/2020    WBC 16.5 (H) 12/10/2020    HGB 8.5 (L) 12/12/2020    HCT 26.2 (L) 12/12/2020    MCV 94.6 12/12/2020     12/12/2020     Lab Results   Component Value Date    NEUTROABS 10.27 (H) 12/06/2020     No results found for: CRPHS  Lab Results   Component Value Date    ALT 27 12/08/2020    AST 20 12/08/2020    ALKPHOS 158 (H) 12/08/2020    BILITOT 0.7 12/08/2020     Lab Results   Component Value Date     12/12/2020    K 4.1 12/12/2020    K 3.9 12/07/2020     12/12/2020    CO2 24 12/12/2020    BUN 17 12/12/2020    CREATININE 0.7 12/12/2020    CREATININE 0.8 12/11/2020    CREATININE 0.9 12/10/2020    GFRAA >60 12/12/2020    LABGLOM >60 12/12/2020    GLUCOSE 134 12/12/2020    PROT 7.4 12/08/2020    LABALBU 3.7 12/08/2020    CALCIUM 8.9 12/12/2020    BILITOT 0.7 12/08/2020    ALKPHOS 158 12/08/2020    AST 20 12/08/2020    ALT 27 12/08/2020     No results found for: CRP  No results found for: [address]  Radiology:  Chest xray from 12/12 reviewed    Microbiology:   Lab Results   Component Value Date    ORG Strep agalactiae (Beta Strep Group B) 12/08/2020     Lab Results   Component Value Date    ORG Strep agalactiae (Beta Strep Group B) 12/08/2020     No results found for: WNDABS  No results found for: RESPSMEAR  No results found for: MPNEUMO, CLAMYDCU, LABLEGI, AFBCX, FUNGSM, LABFUNG  No results found for:

## 2020-12-13 NOTE — PROGRESS NOTES
POD#3  Awake, alert. No complaints. Denies CP, palpitations, SOB at rest, dizziness/lightheadedness. Vitals:    12/13/20 0415 12/13/20 0630 12/13/20 0730 12/13/20 0745   BP: (!) 113/57   (!) 101/57   Pulse: 86   92   Resp: 16   20   Temp: 97.7 °F (36.5 °C)   97.6 °F (36.4 °C)   TempSrc: Temporal   Temporal   SpO2:   94% 96%   Weight:  201 lb (91.2 kg)     Height:         O2: RA      Intake/Output Summary (Last 24 hours) at 12/13/2020 0820  Last data filed at 12/13/2020 0630  Gross per 24 hour   Intake 520 ml   Output 650 ml   Net -130 ml         Recent Labs     12/10/20  1458 12/11/20  0505 12/12/20  0633   WBC 16.5* 10.1 12.1*   HGB 9.3* 8.8* 8.5*   HCT 29.0* 27.1* 26.2*    267 246      Recent Labs     12/10/20  1458 12/11/20  0505 12/12/20  0633   BUN 23 19 17   CREATININE 0.9 0.8 0.7       PE  Cardiac: RRR  Lungs: decreased bases  Chest incision with intact GIULIANA DSD. Sternum stable. Prior chest tube site incisions C/D/I, no erythema with intact sutures. Epicardial pacing wires present and secure. Abd: Soft, nontender, +BS  Ext: Incisions C/D/I, approximated, no erythema, no sig edema           A/P: POD# 3    1. CAD S/p CABG x3 (LIMA-LAD, SVG-OM1, SVG-PDA)  - DAPT, Lipitor, low dose metoprolol   - all CTs removed, wires kept in place  - PT/OT       2. Acute Postoperative Respiratory Insufficiency  - 2/2 surgery  -on RA     3.  HTN  -on metoprolol, jessa well     4. Hematuria  -urology following     5. UTI /prostatitis  - Urine culture with Strep agalactiae (Beta Strep Group B), sensitivity to follow   - Preop on Rocephin   - Given Genatmicin intraop, on perioperative ancef  - ID following      6.  Post op urinary retention-  -cheney reinserted  - urology following  - on flomax      Dispo: ambulated 275ft; possibly home today        This patient's case and care plan was discussed with the attending surgeon

## 2020-12-13 NOTE — PROGRESS NOTES
hematochezia, melena, acholic stools  Genito-Urinary:  Dysuria, urgency, frequency, hematuria  Musculoskeletal:  Joint pain, joint stiffness, joint swelling, muscle painback pain  Neurology:  Headache, focal neurological deficits, weakness, numbness, paresthesia   Derm:  Rashes, ulcers, excoriations, bruising  Extremities:  Decreased ROM, peripheral edema, mottling      OBJECTIVE:    BP (!) 101/57   Pulse 92   Temp 97.6 °F (36.4 °C) (Temporal)   Resp 20   Ht 5' 10\" (1.778 m)   Wt 201 lb (91.2 kg)   SpO2 96%   BMI 28.84 kg/m²     General appearance:  awake, alert, and oriented to person, place, time, and purpose; appears stated age and cooperative; no apparent distress no labored breathing 4L NC  HEENT:  Conjunctivae/corneas clear. Neck: Supple. No jugular venous distention. Respiratory: symmetrical; coarse; no wheezes; no rhonchi; no rales  Cardiovascular: rhythm regular; rate controlled; no murmurs post surgical sternal scar, dressed  Abdomen: Soft, nontender, nondistended  Extremities:  peripheral pulses present; no peripheral edema; no ulcers  Musculoskeletal: No clubbing, cyanosis, no bilateral lower extremity edema. Brisk capillary refill. Skin:  No rashes  on visible skin  Neurologic: awake, alert and following commands     ASSESSMENT and PLAN:  · NSTEMI- Aspirin, beta-blocker, statin. Echocardiogram 12/6 reviewed. Underwent cardiac catheterization 12/7 showing multivessel disease. CTS consulted, underwent CABG workup. 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 Then transitioned to ancef by ID. He admits to urination problems at home,  PSA 0.3. Urology following. To follow up with urlogy outpatient for KERA. Had to have coude placed last night  · Post operative anemia- Hemoglobin 8.7 this am, continue to monitor.  Transfuse if <8.  · Hypertension, holding meds for now due to hypotension post surgery  · History of colorectal cancer status post resection, chemotherapy and radiation. · COPD          DISPOSITION: Continue current plan of care, dc planning    Medications:  REVIEWED DAILY    Infusion Medications    dextrose       Scheduled Medications    metoprolol tartrate  25 mg Oral BID    aspirin  81 mg Oral Daily    sennosides-docusate sodium  1 tablet Oral BID    ferrous sulfate  325 mg Oral BID WC    vitamin C  500 mg Oral BID    folic acid  1 mg Oral Daily    insulin lispro  0-6 Units Subcutaneous TID WC    insulin lispro  0-3 Units Subcutaneous Nightly    pantoprazole  40 mg Oral Daily    atorvastatin  40 mg Oral Nightly    sodium chloride flush  10 mL Intravenous 2 times per day    magnesium oxide  400 mg Oral Daily    mupirocin   Nasal BID    ipratropium-albuterol  1 ampule Inhalation Q4H WA    clopidogrel  75 mg Oral Daily    tamsulosin  0.4 mg Oral Daily    ceFAZolin  2 g Intravenous Q8H     PRN Meds: acetaminophen, HYDROcodone 5 mg - acetaminophen **OR** HYDROcodone 5 mg - acetaminophen, magnesium hydroxide, potassium chloride, ondansetron, bisacodyl, sodium chloride flush, glucose, dextrose, glucagon (rDNA), dextrose    Labs:     Recent Labs     12/10/20  1458 12/11/20  0505 12/12/20  0633   WBC 16.5* 10.1 12.1*   HGB 9.3* 8.8* 8.5*   HCT 29.0* 27.1* 26.2*    267 246       Recent Labs     12/10/20  1458 12/11/20  0505 12/11/20  2117 12/12/20  0633    142  --  133   K 4.2 4.4 4.1 4.1    109*  --  100   CO2 23 24  --  24   BUN 23 19  --  17   CREATININE 0.9 0.8  --  0.7   CALCIUM 7.9* 8.5*  --  8.9       No results for input(s): PROT, ALB, ALKPHOS, ALT, AST, BILITOT, AMYLASE, LIPASE in the last 72 hours. Recent Labs     12/10/20  1458   INR 1.5       No results for input(s): Gab Oiler in the last 72 hours.     Chronic labs:    Lab Results   Component Value Date    CHOL 165 12/07/2020    TRIG 85 12/07/2020    HDL 44 12/07/2020    LDLCALC 104 (H) 12/07/2020    PSA 0.30 12/12/2020    INR 1.5 12/10/2020    LABA1C 5.6 12/08/2020       Radiology: REVIEWED DAILY    +++++++++++++++++++++++++++++++++++++++++++++++++  [address], [place]  +++++++++++++++++++++++++++++++++++++++++++++++++  NOTE: This report was transcribed using voice recognition software. Every effort was made to ensure accuracy; however, inadvertent computerized transcription errors may be present.

## 2020-12-13 NOTE — CONSULTS
Nutrition Education    · Verbally reviewed information with Patient  · Educated on Carb Controlled, Cardiac Diet   · Written educational materials provided. · Contact name and number provided. · Refer to Patient Education activity for more details.     Electronically signed by Rere Quintana RD, LD on 12/13/20 at 2:33 PM EST    Contact: sharri [phone]

## 2020-12-14 VITALS
HEART RATE: 96 BPM | SYSTOLIC BLOOD PRESSURE: 141 MMHG | WEIGHT: 200.2 LBS | TEMPERATURE: 96.5 F | RESPIRATION RATE: 16 BRPM | BODY MASS INDEX: 28.66 KG/M2 | HEIGHT: 70 IN | DIASTOLIC BLOOD PRESSURE: 66 MMHG | OXYGEN SATURATION: 94 %

## 2020-12-14 LAB
ANION GAP SERPL CALCULATED.3IONS-SCNC: 6 MMOL/L (ref 7–16)
BUN BLDV-MCNC: 16 MG/DL (ref 8–23)
CALCIUM SERPL-MCNC: 8.6 MG/DL (ref 8.6–10.2)
CHLORIDE BLD-SCNC: 99 MMOL/L (ref 98–107)
CO2: 29 MMOL/L (ref 22–29)
CREAT SERPL-MCNC: 0.8 MG/DL (ref 0.7–1.2)
GFR AFRICAN AMERICAN: >60
GFR NON-AFRICAN AMERICAN: >60 ML/MIN/1.73
GLUCOSE BLD-MCNC: 102 MG/DL (ref 74–99)
HCT VFR BLD CALC: 25.4 % (ref 37–54)
HEMOGLOBIN: 8.3 G/DL (ref 12.5–16.5)
MCH RBC QN AUTO: 30.6 PG (ref 26–35)
MCHC RBC AUTO-ENTMCNC: 32.7 % (ref 32–34.5)
MCV RBC AUTO: 93.7 FL (ref 80–99.9)
PDW BLD-RTO: 12.7 FL (ref 11.5–15)
PLATELET # BLD: 303 E9/L (ref 130–450)
PMV BLD AUTO: 10.5 FL (ref 7–12)
POTASSIUM SERPL-SCNC: 3.8 MMOL/L (ref 3.5–5)
RBC # BLD: 2.71 E12/L (ref 3.8–5.8)
SODIUM BLD-SCNC: 134 MMOL/L (ref 132–146)
WBC # BLD: 8.7 E9/L (ref 4.5–11.5)

## 2020-12-14 PROCEDURE — 6360000002 HC RX W HCPCS: Performed by: NURSE PRACTITIONER

## 2020-12-14 PROCEDURE — 93798 PHYS/QHP OP CAR RHAB W/ECG: CPT

## 2020-12-14 PROCEDURE — 6370000000 HC RX 637 (ALT 250 FOR IP): Performed by: PHYSICIAN ASSISTANT

## 2020-12-14 PROCEDURE — 6370000000 HC RX 637 (ALT 250 FOR IP): Performed by: NURSE PRACTITIONER

## 2020-12-14 PROCEDURE — 80048 BASIC METABOLIC PNL TOTAL CA: CPT

## 2020-12-14 PROCEDURE — 94640 AIRWAY INHALATION TREATMENT: CPT

## 2020-12-14 PROCEDURE — 97535 SELF CARE MNGMENT TRAINING: CPT

## 2020-12-14 PROCEDURE — 2580000003 HC RX 258: Performed by: NURSE PRACTITIONER

## 2020-12-14 PROCEDURE — 85027 COMPLETE CBC AUTOMATED: CPT

## 2020-12-14 PROCEDURE — 36415 COLL VENOUS BLD VENIPUNCTURE: CPT

## 2020-12-14 PROCEDURE — 51702 INSERT TEMP BLADDER CATH: CPT

## 2020-12-14 RX ORDER — CEPHALEXIN 500 MG/1
500 CAPSULE ORAL 4 TIMES DAILY
Qty: 56 CAPSULE | Refills: 0 | Status: SHIPPED | OUTPATIENT
Start: 2020-12-14 | End: 2020-12-28

## 2020-12-14 RX ORDER — CLOPIDOGREL BISULFATE 75 MG/1
75 TABLET ORAL DAILY
Qty: 30 TABLET | Refills: 3 | Status: SHIPPED | OUTPATIENT
Start: 2020-12-14 | End: 2021-04-22 | Stop reason: SDUPTHER

## 2020-12-14 RX ORDER — FERROUS SULFATE 325(65) MG
325 TABLET ORAL 2 TIMES DAILY WITH MEALS
Qty: 60 TABLET | Refills: 0 | Status: SHIPPED | OUTPATIENT
Start: 2020-12-14 | End: 2021-01-22 | Stop reason: ALTCHOICE

## 2020-12-14 RX ORDER — FOLIC ACID 1 MG/1
1 TABLET ORAL DAILY
Qty: 30 TABLET | Refills: 0 | Status: SHIPPED | OUTPATIENT
Start: 2020-12-14 | End: 2021-01-22 | Stop reason: ALTCHOICE

## 2020-12-14 RX ORDER — ASCORBIC ACID 500 MG
500 TABLET ORAL 2 TIMES DAILY
Qty: 60 TABLET | Refills: 0 | Status: SHIPPED | OUTPATIENT
Start: 2020-12-14 | End: 2021-01-22 | Stop reason: ALTCHOICE

## 2020-12-14 RX ORDER — CEPHALEXIN 500 MG/1
500 CAPSULE ORAL EVERY 6 HOURS SCHEDULED
Status: DISCONTINUED | OUTPATIENT
Start: 2020-12-14 | End: 2020-12-14 | Stop reason: HOSPADM

## 2020-12-14 RX ORDER — HYDROCODONE BITARTRATE AND ACETAMINOPHEN 5; 325 MG/1; MG/1
1 TABLET ORAL EVERY 4 HOURS PRN
Qty: 42 TABLET | Refills: 0 | Status: SHIPPED | OUTPATIENT
Start: 2020-12-14 | End: 2020-12-21

## 2020-12-14 RX ORDER — PANTOPRAZOLE SODIUM 40 MG/1
40 TABLET, DELAYED RELEASE ORAL DAILY
Qty: 14 TABLET | Refills: 0 | Status: SHIPPED | OUTPATIENT
Start: 2020-12-14 | End: 2021-01-22 | Stop reason: ALTCHOICE

## 2020-12-14 RX ORDER — ASPIRIN 81 MG/1
81 TABLET ORAL DAILY
Qty: 30 TABLET | Refills: 3 | Status: SHIPPED | OUTPATIENT
Start: 2020-12-14

## 2020-12-14 RX ORDER — TAMSULOSIN HYDROCHLORIDE 0.4 MG/1
0.4 CAPSULE ORAL DAILY
Qty: 30 CAPSULE | Refills: 0 | Status: SHIPPED | OUTPATIENT
Start: 2020-12-15 | End: 2021-02-10 | Stop reason: ALTCHOICE

## 2020-12-14 RX ORDER — DOCUSATE SODIUM 100 MG/1
100 CAPSULE, LIQUID FILLED ORAL 2 TIMES DAILY PRN
Qty: 20 CAPSULE | Refills: 0 | Status: SHIPPED | OUTPATIENT
Start: 2020-12-14

## 2020-12-14 RX ORDER — ATORVASTATIN CALCIUM 40 MG/1
40 TABLET, FILM COATED ORAL NIGHTLY
Qty: 30 TABLET | Refills: 3 | Status: SHIPPED | OUTPATIENT
Start: 2020-12-14 | End: 2021-04-22 | Stop reason: SDUPTHER

## 2020-12-14 RX ADMIN — OXYCODONE HYDROCHLORIDE AND ACETAMINOPHEN 500 MG: 500 TABLET ORAL at 09:15

## 2020-12-14 RX ADMIN — TAMSULOSIN HYDROCHLORIDE 0.4 MG: 0.4 CAPSULE ORAL at 09:15

## 2020-12-14 RX ADMIN — MAGNESIUM GLUCONATE 500 MG ORAL TABLET 400 MG: 500 TABLET ORAL at 09:15

## 2020-12-14 RX ADMIN — HYDROCODONE BITARTRATE AND ACETAMINOPHEN 2 TABLET: 5; 325 TABLET ORAL at 14:06

## 2020-12-14 RX ADMIN — METOPROLOL TARTRATE 25 MG: 25 TABLET, FILM COATED ORAL at 09:16

## 2020-12-14 RX ADMIN — PANTOPRAZOLE SODIUM 40 MG: 40 TABLET, DELAYED RELEASE ORAL at 09:16

## 2020-12-14 RX ADMIN — SODIUM CHLORIDE, PRESERVATIVE FREE 10 ML: 5 INJECTION INTRAVENOUS at 09:16

## 2020-12-14 RX ADMIN — CLOPIDOGREL 75 MG: 75 TABLET, FILM COATED ORAL at 09:15

## 2020-12-14 RX ADMIN — Medication 2 G: at 03:20

## 2020-12-14 RX ADMIN — HYDROCODONE BITARTRATE AND ACETAMINOPHEN 2 TABLET: 5; 325 TABLET ORAL at 09:14

## 2020-12-14 RX ADMIN — POTASSIUM CHLORIDE 40 MEQ: 1500 TABLET, EXTENDED RELEASE ORAL at 09:16

## 2020-12-14 RX ADMIN — ASPIRIN 81 MG: 81 TABLET, COATED ORAL at 09:15

## 2020-12-14 RX ADMIN — DOCUSATE SODIUM 50 MG AND SENNOSIDES 8.6 MG 1 TABLET: 8.6; 5 TABLET, FILM COATED ORAL at 09:15

## 2020-12-14 RX ADMIN — MUPIROCIN: 20 OINTMENT TOPICAL at 09:14

## 2020-12-14 RX ADMIN — FERROUS SULFATE TAB 325 MG (65 MG ELEMENTAL FE) 325 MG: 325 (65 FE) TAB at 09:16

## 2020-12-14 RX ADMIN — FOLIC ACID 1 MG: 1 TABLET ORAL at 09:16

## 2020-12-14 RX ADMIN — IPRATROPIUM BROMIDE AND ALBUTEROL SULFATE 1 AMPULE: .5; 3 SOLUTION RESPIRATORY (INHALATION) at 12:30

## 2020-12-14 RX ADMIN — Medication 2 G: at 11:00

## 2020-12-14 ASSESSMENT — PAIN SCALES - GENERAL
PAINLEVEL_OUTOF10: 0
PAINLEVEL_OUTOF10: 8
PAINLEVEL_OUTOF10: 7
PAINLEVEL_OUTOF10: 0

## 2020-12-14 NOTE — CARE COORDINATION
POD#4 CABG x 3. Ambulated 400 ft this am with cardiac rehab. Referral made to Michele at [facility] and they accepted pt. SW met with pt in room and discharge plan is to return home with his wife. Pt is agreeable to [facility]. Pt made aware he will need to have someone with him 24/7 for 1st 1-2 weeks after dc. Pt said his wife will be with him.  Nimesh/cassandra will follow

## 2020-12-14 NOTE — PROGRESS NOTES
Culture, Routine   Date Value Ref Range Status   12/08/2020 <10,000 CFU/mL  Gram positive organism   (A)  Final   12/08/2020 >100,000 CFU/ml  Final     MRSA Culture Only   Date Value Ref Range Status   12/08/2020 Methicillin resistant Staph aureus not isolated  Final       ASSESSMENT:  · Cystitis with group B strep rule out prostatitis  · Leukocytosis - resolved    PLAN:  · Continue Ancef -- transitioned to oral cephalexin for discharge for 2 weeks. Script given to nurse. · Urology following-workup outpatient  · Estrada reinserted due to retention  · Check final cultures  · Monitor labs    Electronically signed by ARGENTINA Olson CNP on 12/14/2020 at 11:43 AM   Pt seen and examined. Above discussed agree with advanced practice nurse. Labs, cultures, and radiographs reviewed. Face to Face encounter occurred. Changes made as necessary.      Bryn James MD

## 2020-12-14 NOTE — PROGRESS NOTES
Hospitalist Progress Note      SYNOPSIS: Patient admitted on 2020 with past medical history of colorectal cancer status post colectomy in , chemotherapy and radiation, hypertension and former tobacco use. Patient was in his usual state of health until around 7 PM when he started having left-sided chest pain. He just got done eating ice cream when the pain started. Pain was described as achy, \"someone pushing on my chest\", 5 out of 10, nonradiating, lasted about 15 to 20 minutes, associated with lightheadedness and diaphoresis. Got some relief after taking aspirin. Underwent heart catheterization  showing multivessel disease. CTS consulted. Underwent CABG x3 10/12/20    SUBJECTIVE:  Stable overnight. No other overnight issues reported. Patient seen and examined  Records reviewed. No acute complaints  Doing well post surgery  Now on room air  No acute complaints  Anxious for discharge      Temp (24hrs), Av.7 °F (36.5 °C), Min:96.9 °F (36.1 °C), Max:98.2 °F (36.8 °C)    DIET: DIET CARDIAC; Carb Control: 5 carb choices (75 gms)/meal  Dietary Nutrition Supplements: Diabetic Oral Supplement  Dietary Nutrition Supplements: Wound Healing Oral Supplement  CODE: Full Code    Intake/Output Summary (Last 24 hours) at 2020 0805  Last data filed at 2020 5662  Gross per 24 hour   Intake 240 ml   Output 900 ml   Net -660 ml       Review of Systems  All bolded are positive; please see HPI  General:  Fever, chills, diaphoresis, fatigue, malaise, night sweats, weight loss  Psychological:  Anxiety, disorientation, hallucinations. ENT:  Epistaxis, headaches, vertigo, visual changes. Cardiovascular:  Chest pain, improving now post surg, irregular heartbeats, palpitations, paroxysmal nocturnal dyspnea. Respiratory:  Shortness of breath, coughing, sputum production, hemoptysis, wheezing, orthopnea.   Gastrointestinal:  Nausea, vomiting, diarrhea, heartburn, constipation, abdominal pain, hematemesis, hematochezia, melena, acholic stools  Genito-Urinary:  Dysuria, urgency, frequency, hematuria  Musculoskeletal:  Joint pain, joint stiffness, joint swelling, muscle painback pain  Neurology:  Headache, focal neurological deficits, weakness, numbness, paresthesia   Derm:  Rashes, ulcers, excoriations, bruising  Extremities:  Decreased ROM, peripheral edema, mottling      OBJECTIVE:    BP (!) 108/54   Pulse 90   Temp 98.2 °F (36.8 °C) (Temporal)   Resp 18   Ht 5' 10\" (1.778 m)   Wt 200 lb 3.2 oz (90.8 kg)   SpO2 93%   BMI 28.73 kg/m²     General appearance:  awake, alert, and oriented to person, place, time, and purpose; appears stated age and cooperative; no apparent distress no labored breathing Room air  HEENT:  Conjunctivae/corneas clear. Neck: Supple. No jugular venous distention. Respiratory: symmetrical; coarse but improved; no wheezes; no rhonchi; no rales  Cardiovascular: rhythm regular; rate controlled; no murmurs post surgical sternal scar, dressed  Abdomen: Soft, nontender, nondistended  Extremities:  peripheral pulses present; no peripheral edema; no ulcers  Musculoskeletal: No clubbing, cyanosis, no bilateral lower extremity edema. Brisk capillary refill. Skin:  No rashes  on visible skin  Neurologic: awake, alert and following commands     ASSESSMENT and PLAN:  · NSTEMI- Aspirin, beta-blocker, statin. Echocardiogram 12/6 reviewed. Underwent cardiac catheterization 12/7 showing multivessel disease. CTS consulted, underwent CABG workup. 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 Then transitioned to ancef by ID. He admits to urination problems at home,  PSA 0.3. Urology following. To follow up with urlogy outpatient for KERA.  Had to have coude placed  · Post operative anemia- Hemoglobin 8.3 this am, transfuse if <8.  · Hypertension, holding meds for now due to hypotension post surgery  · History of colorectal cancer status post resection, chemotherapy and radiation. · COPD          DISPOSITION: OK to dc from IM POV    Medications:  REVIEWED DAILY    Infusion Medications    dextrose       Scheduled Medications    metoprolol tartrate  25 mg Oral BID    aspirin  81 mg Oral Daily    sennosides-docusate sodium  1 tablet Oral BID    ferrous sulfate  325 mg Oral BID WC    vitamin C  500 mg Oral BID    folic acid  1 mg Oral Daily    pantoprazole  40 mg Oral Daily    atorvastatin  40 mg Oral Nightly    sodium chloride flush  10 mL Intravenous 2 times per day    magnesium oxide  400 mg Oral Daily    mupirocin   Nasal BID    ipratropium-albuterol  1 ampule Inhalation Q4H WA    clopidogrel  75 mg Oral Daily    tamsulosin  0.4 mg Oral Daily    ceFAZolin  2 g Intravenous Q8H     PRN Meds: acetaminophen, HYDROcodone 5 mg - acetaminophen **OR** HYDROcodone 5 mg - acetaminophen, magnesium hydroxide, potassium chloride, ondansetron, bisacodyl, sodium chloride flush, glucose, dextrose, glucagon (rDNA), dextrose    Labs:     Recent Labs     12/12/20  0633 12/13/20  0911 12/14/20  0516   WBC 12.1* 12.6* 8.7   HGB 8.5* 8.7* 8.3*   HCT 26.2* 27.8* 25.4*    268 303       Recent Labs     12/12/20  0633 12/13/20  0911 12/14/20  0516    135 134   K 4.1 4.0 3.8    99 99   CO2 24 26 29   BUN 17 18 16   CREATININE 0.7 0.7 0.8   CALCIUM 8.9 9.0 8.6       No results for input(s): PROT, ALB, ALKPHOS, ALT, AST, BILITOT, AMYLASE, LIPASE in the last 72 hours. No results for input(s): INR in the last 72 hours. No results for input(s): Geneva Lager in the last 72 hours.     Chronic labs:    Lab Results   Component Value Date    CHOL 165 12/07/2020    TRIG 85 12/07/2020    HDL 44 12/07/2020    LDLCALC 104 (H) 12/07/2020    PSA 0.30 12/12/2020    INR 1.5 12/10/2020    LABA1C 5.6 12/08/2020       Radiology: Ashley Stafford DAILY    +++++++++++++++++++++++++++++++++++++++++++++++++  Toni Ferguson Physician - [address] [place], [place]  +++++++++++++++++++++++++++++++++++++++++++++++++  NOTE: This report was transcribed using voice recognition software. Every effort was made to ensure accuracy; however, inadvertent computerized transcription errors may be present.

## 2020-12-14 NOTE — PROGRESS NOTES
POD#4 Awake, alert. No complaints. In chair resting comfortably and anxious to get home. Denies CP, palpitations, SOB at rest, dizziness/lightheadedness. Vitals:    12/14/20 0010 12/14/20 0415 12/14/20 0634 12/14/20 0655   BP: (!) 104/59 (!) 110/59  (!) 108/54   Pulse: 94 90  90   Resp: 18 20  18   Temp: 97.6 °F (36.4 °C) 97.8 °F (36.6 °C)  98.2 °F (36.8 °C)   TempSrc: Oral Temporal  Temporal   SpO2: 92% 92%  93%   Weight:   200 lb 3.2 oz (90.8 kg)    Height:         O2: RA      Intake/Output Summary (Last 24 hours) at 12/14/2020 0735  Last data filed at 12/14/2020 3082  Gross per 24 hour   Intake 360 ml   Output 900 ml   Net -540 ml           Recent Labs     12/12/20  0633 12/13/20  0911 12/14/20  0516   WBC 12.1* 12.6* 8.7   HGB 8.5* 8.7* 8.3*   HCT 26.2* 27.8* 25.4*    268 303      Recent Labs     12/12/20  0633 12/13/20  0911 12/14/20  0516   BUN 17 18 16   CREATININE 0.7 0.7 0.8         Telemetry: NSR      PE  Cardiac: RRR  Lungs: decreased bases  Chest incision with intact GIULIANA DSD. Sternum stable. Prior chest tube site incisions C/D/I, no erythema with intact sutures. Epicardial pacing wires present and secure. Abd: Soft, nontender, +BS  Ext: Incisions C/D/I, approximated, no erythema, no sig edema               A/P: POD# 4     1. CAD S/p CABG x3 (LIMA-LAD, SVG-OM1, SVG-PDA)  - DAPT, Lipitor, low dose metoprolol   - all CTs and wires removed; epicardial pacing wires cut without difficulty, patient tolerated well  - PT/OT       2. Acute Postoperative Respiratory Insufficiency  - 2/2 surgery  -on RA     3.  HTN  -on metoprolol, jessa well     4. Hematuria  -urology following     5. UTI /prostatitis  - Urine culture with Strep agalactiae (Beta Strep Group B), sensitivity to follow   - Preop on Rocephin   - Given Genatmicin intraop, on perioperative ancef  - ID following      6. Post op urinary retention-  - cheney reinserted   - urology following and will continue outpatient   - on flomax      7. Post-op deconditioning   - PT/OT  - currently ambulating 275ft        Dispo: ambulated 275ft; possibly home today if cheney can come out and if patient walks 400ft         This patient's case and care plan was discussed with the attending surgeon

## 2020-12-14 NOTE — PROGRESS NOTES
OT BEDSIDE TREATMENT NOTE      Date:2020  Patient Name: Saint Crawford  MRN: [id number]  : 1945  Room: [address] [place] [address]     Per OT Eval:    Evaluating OT: Yady Appiah OTR/L #[id number]        AM-PAC Daily Activity Raw Score:      Recommended Adaptive Equipment: LB AE      Diagnosis: Chest pain [R07.9]  Chest pain [R07.9]  Chest pain [R07.9]  Chest pain [R07.9]     Surgery/Procedure: CORONARY ARTERY BYPASS 12/10     Pertinent Medical History:   Past Medical History   History reviewed. No pertinent past medical history.        Precautions:  Falls, sternal precautions, chest tubes 2x, external pacer, GIULIANA drain, O2     Home Living: Pt lives w/ wife  in a 2 floor house with 3 step(s) to enter and B rail(s); bed/bath on 2nd floor w/ full flight (uses chair glide)  Bathroom setup: walk-in shower w/ shower chair  Equipment owned: shower chair     Prior Level of Function: Independent with ADLs; Independent with IADLs. No AD for ambulation. Driving: Yes  Occupation: not stated     Pain Level: pt c/o 7-8/10 incision pain this session; educated pt on pain management     Cognition: A&O: 4/4  Follows 1-2 step commands               Memory: good              Comprehension good              Problem solving: good              Judgement/safety: good                 Communication skills:               Vision: wfl                     Glasses:none                                                        Hearing: wfl                RASS: 0  CAM-ICU: (NT) Delirium     UE Assessment:  Hand Dominance: Right [x]?   Left []?       ROM Strength STM goal: PRN   RUE  WFL within sternal precautions NT        LUE WFL within sternal precautions NT           Sensation: No c/o numbness or tingling in extremities   Tone: WNL   Edema: none noted     Functional Assessment    Initial Eval Status  Date: 20 Treatment Status  Date: 20 STG=LTG  5-14  days    Feeding Independent  Independent                                Grooming Min. A  Setup  Pt washed face, applied deodorant seated upright in chair                       Mod. I   while standing sink level requiring no AD for balance and demonstrating good tolerance       UB dressing Min. A  DNT  Michael per previous level                       Mod. I      LB dressing Min. A w/ AE to don/doff socks  Michael  Pt donned/doff hospital socks with use of reacher and sockaide Mod. I   using AE as needed for safe reach/ energy conservation     Bathing Mod. A (simulated) Michael  Simulated Task                        Mod. I   using AE as needed for safe reach/ energy conservation        Toileting Mod. A (simulated) maxA  Pt having of cheney cathetet                        Mod. i      Bed Mobility  Supine to sit: Mod. A x2     Sit to supine: NT  DNT  modA x2 per previous level                       Mod. I  in prep of ADL tasks & transfers   Functional Transfers Sit to stand: Min. A      Stand to sit: Min. A  SBA  Sit to Stand  Stand to Sit                       Mod. I  sit<>stand/functional bathroom transfers using AD/DME as needed for balance and safety   Functional Mobility Min. A w/ no AD (to bedside chair)  SBA  Pt ambulated in mcconnell with no device                      Mod. I   functional/bathroom mobility using no AD as needed & demonstrating good safety      Balance Sitting:     Static: SBA    Dynamic:SBA  Standing: Min.  A Sitting:  Independent    Standing:  SBA  independent dynamic sitting balance; independent dynamic standing balance  during ADL tasks & transfers   Endurance/Activity Tolerance    fair tolerance with light activity.   Fair good   tolerance with moderate activity/self care routine   Visual/  Perceptual    WFL                                      Education:  Pt was educated through out treatment regarding precautions to follow, proper technique & safety with, functional transfers & mobility, use of AE to assist with LB dressing task to improve safety & prevent falls and allow pt to return home safely. Comments: Upon arrival pt ambulating with cardiac rehab, agreeable to OT session. Pt completed of transfers, functional mobility and light ADL tasks this session. Spoke with pt in regards to home setup, stating of already having shower bench and standard toilet is high, no difficulty with transfer. At end of session, pt seated upright in chair eating of lunch meal, all lines and tubes intact, call light within reach. · Pt has made fair progress towards set goals.    · Continue with current plan of care focusing on increasing of independency with ADL tasks      Treatment Time In: 11:23am          Treatment Time Out: 11:37am             Treatment Charges: Mins Units   Ther Ex  [id number]     Manual Therapy [id number]     Thera Activities [id number]     ADL/Home Mgt [id number] 14 1   Neuro Re-ed [id number]     Group Therapy      Orthotic manage/training  [id number]     Non-Billable Time     Total Timed Treatment 14 1        Malini GIORDANO/L [id number]

## 2020-12-14 NOTE — PROGRESS NOTES
CLINICAL PHARMACY NOTE: MEDS TO [address] [place] [address] Drive Select Patient?: No  Total # of Prescriptions Filled: 1   The following medications were delivered to the patient:  · Cephalexin 500 mg  Total # of Interventions Completed: 2  Time Spent (min): 30    Additional Documentation:

## 2020-12-16 NOTE — DISCHARGE SUMMARY
Physician Discharge Summary     Patient ID:  Preston Santos  [id number]  60 y.o.  1945    Admit date: 12/6/2020    Discharge date and time: 12/14/2020  8:08 PM     Admitting Physician: Dane Rosales DO     Discharge Physician: Dane Rosales DO    Admission Diagnoses: Chest pain [R07.9]  Chest pain [R07.9]  Chest pain [R07.9]  Chest pain [R07.9]    Discharge Diagnoses: Severe multivessel coronary artery disease. Admission Condition: good    Discharged Condition: good    Indication for Admission: 69 yo male with hx of HTN, rectal CA in 2008, former smoker, who presented to the ED with chest \"pressure\". Initial troponin was negative, however repeat was 0.2. 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 Of note, he did have a left main lesion which was significant by FFR. Hospital Course: The patient presented to the ED with chest pressure on 12/06/2020. He was found to have severe MVCAD, and Dr. David Cyr was consulted. The patient underwent preoperative testing, and CABG x3 was performed on 12/10/2020. A UTI found during preoperative studies was treated with gentamicin. Surgery as uncomplicated, and the patient was transferred to [facility] in stable condition. He was extubated without difficulty several hours after surgery. By POD 2, the patient was transferred to [facility]. The patient required multiple straight caths for urinary retention, and ultimately, a cheney catheter was re-inserted. Urology was consulted. All chest tubes were removed in normal fashion once drainage slowed. The patient's progress with ambulation gradually increased to 400ft by POD 4, and he did not require any supplemental O2. He was discharged to home with DAPT, beta blocker, statin, Po cephalexin x2 weeks for UTI/prostatitis. Consults: cardiology, rehabilitation medicine, urology and infectious disease.     Discharge Exam:  Vitals:     12/14/20 0010 12/14/20 0415 12/14/20 0634 12/14/20 0655   BP: (!) 104/59 (!) 110/59   (!) 108/54   Pulse: 94 90   90   Resp: 18 20   18   Temp: 97.6 °F (36.4 °C) 97.8 °F (36.6 °C)   98.2 °F (36.8 °C)   TempSrc: Oral Temporal   Temporal   SpO2: 92% 92%   93%   Weight:     200 lb 3.2 oz (90.8 kg)     Height:                 O2: RA        Intake/Output Summary (Last 24 hours) at 12/14/2020 0735  Last data filed at 12/14/2020 8740      Gross per 24 hour   Intake 360 ml   Output 900 ml   Net -540 ml                     Recent Labs     12/12/20  0633 12/13/20  0911 12/14/20  0516   WBC 12.1* 12.6* 8.7   HGB 8.5* 8.7* 8.3*   HCT 26.2* 27.8* 25.4*    268 303            Recent Labs     12/12/20  0633 12/13/20  0911 12/14/20  0516   BUN 17 18 16   CREATININE 0.7 0.7 0.8            Telemetry: NSR        PE  Cardiac: RRR  Lungs: decreased bases  Chest incision with intact GIULIANA DSD.  Sternum stable. Prior chest tube site incisions C/D/I, no erythema with intact sutures. Epicardial pacing wires present and secure.    Abd: Soft, nontender, +BS  Ext: Incisions C/D/I, approximated, no erythema, no sig edema      Disposition: home    Patient Instructions:    Ed Cerna Medication Instructions XRA:[id number]    Printed on:12/16/20 1215   Medication Information                      aspirin 81 MG EC tablet  Take 1 tablet by mouth daily             atorvastatin (LIPITOR) 40 MG tablet  Take 1 tablet by mouth nightly             cephALEXin (KEFLEX) 500 MG capsule  Take 1 capsule by mouth 4 times daily for 14 days             clopidogrel (PLAVIX) 75 MG tablet  Take 1 tablet by mouth daily             docusate sodium (COLACE) 100 MG capsule  Take 1 capsule by mouth 2 times daily as needed for Constipation (constipation)             ferrous sulfate (IRON 325) 325 (65 Fe) MG tablet  Take 1 tablet by mouth 2 times daily (with meals)             folic acid (FOLVITE) 1 MG tablet  Take 1 tablet by mouth daily             HYDROcodone-acetaminophen (NORCO) 5-325 MG per tablet  Take 1 tablet by mouth every 4 hours as needed for Pain for up to 7 days. Intended supply: 7 days. Take lowest dose possible to manage pain             magnesium oxide (MAG-OX) 400 (241.3 Mg) MG TABS tablet  Take 1 tablet by mouth daily for 14 days             metoprolol tartrate (LOPRESSOR) 25 MG tablet  Take 1 tablet by mouth 2 times daily             pantoprazole (PROTONIX) 40 MG tablet  Take 1 tablet by mouth daily for 14 days             tamsulosin (FLOMAX) 0.4 MG capsule  Take 1 capsule by mouth daily             vitamin C (VITAMIN C) 500 MG tablet  Take 1 tablet by mouth 2 times daily               Activity: sternal precautions  Diet: cardiac diet  Wound Care: keep wound clean and dry    Follow-up with Dr. Onelia Shaikh in the office on 1/12/2020    Smoking cessation education provided prior to discharge    Discussed with patient the benefits of participation in cardiac rehab after discharge once approved safe by the surgeon and that a referral will be made at the follow up appointment. Pt verbalized comprehension.     Signed:  Electronically signed by EDUARDO Barnes on 12/16/2020 at 12:29 PM

## 2020-12-28 ENCOUNTER — HOSPITAL ENCOUNTER (OUTPATIENT)
Dept: CARDIAC REHAB | Age: 75
Setting detail: THERAPIES SERIES
Discharge: HOME | End: 2020-12-28
Payer: MEDICARE

## 2021-01-06 ENCOUNTER — HOSPITAL ENCOUNTER (OUTPATIENT)
Dept: CARDIAC REHAB | Age: 76
Setting detail: THERAPIES SERIES
Discharge: HOME | End: 2021-01-06
Payer: MEDICARE

## 2021-01-08 ENCOUNTER — HOSPITAL ENCOUNTER (OUTPATIENT)
Dept: CARDIAC REHAB | Age: 76
Setting detail: THERAPIES SERIES
Discharge: HOME | End: 2021-01-08
Payer: MEDICARE

## 2021-01-11 ENCOUNTER — HOSPITAL ENCOUNTER (OUTPATIENT)
Dept: CARDIAC REHAB | Age: 76
Setting detail: THERAPIES SERIES
Discharge: HOME | End: 2021-01-11
Payer: MEDICARE

## 2021-01-12 ENCOUNTER — OFFICE VISIT (OUTPATIENT)
Dept: CARDIOTHORACIC SURGERY | Age: 76
End: 2021-01-12

## 2021-01-12 VITALS
BODY MASS INDEX: 26.34 KG/M2 | HEART RATE: 75 BPM | HEIGHT: 70 IN | SYSTOLIC BLOOD PRESSURE: 99 MMHG | DIASTOLIC BLOOD PRESSURE: 60 MMHG | WEIGHT: 184 LBS

## 2021-01-12 DIAGNOSIS — Z95.1 S/P CABG X 3: Primary | ICD-10-CM

## 2021-01-12 PROCEDURE — 99024 POSTOP FOLLOW-UP VISIT: CPT | Performed by: PHYSICIAN ASSISTANT

## 2021-01-12 SDOH — HEALTH STABILITY: MENTAL HEALTH: HOW OFTEN DO YOU HAVE A DRINK CONTAINING ALCOHOL?: NOT ASKED

## 2021-01-12 ASSESSMENT — ENCOUNTER SYMPTOMS
WHEEZING: 0
SHORTNESS OF BREATH: 0

## 2021-01-12 NOTE — PROGRESS NOTES
Subjective:      Chief Complaint   Patient presents with    Post-Op Check     cabg x3       Patient ID: Roxann Boucher is a 70 y.o. male who presents to office for routine 1 month follow up s/p CABG x 3 on 12/10/2020. Patient states he is doing well and denies any CP, SOB or incisional problems   He does however state that he has noticed over the past week or so that first thing in the morning he has been feeling lightheaded after taking his BB, yesterday his SBP was less than 100 when he checked, though he does not remember the exact number  Today in office SBP is 99    HPI    Review of Systems   Constitutional: Negative for chills and fever. Respiratory: Negative for shortness of breath and wheezing. Cardiovascular: Negative for chest pain, palpitations and leg swelling. Neurological: Negative for light-headedness. Objective:   Physical Exam  Constitutional:       Appearance: Normal appearance. Neck:      Musculoskeletal: Normal range of motion and neck supple. Cardiovascular:      Rate and Rhythm: Normal rate and regular rhythm. Pulses: Normal pulses. Heart sounds: Normal heart sounds. Pulmonary:      Effort: Pulmonary effort is normal.      Breath sounds: Normal breath sounds. Comments: midsternal and chest tube incisions well healed without evidence of infection. Sternum stable. Abdominal:      General: Bowel sounds are normal.   Musculoskeletal: Normal range of motion. General: No swelling. Comments: Endoscopic vein harvest incisions intact without evidence of infection     Neurological:      Mental Status: He is alert and oriented to person, place, and time. Assessment:      S/p CABG x 3        Plan:      Remove sternal precautions after 1/21/2021  Cardiac rehab referral  Continue follow up with PCP, Cardiology as scheduled. Decrease Lopressor to 12.5 mg bid   Encouraged to call office with any questions, concerns.   Otherwise no further follow up necessary from CTS standpoint.               Bessie Dugan PA-C

## 2021-01-15 ENCOUNTER — HOSPITAL ENCOUNTER (OUTPATIENT)
Dept: CARDIAC REHAB | Age: 76
Setting detail: THERAPIES SERIES
Discharge: HOME | End: 2021-01-15
Payer: MEDICARE

## 2021-01-22 ENCOUNTER — OFFICE VISIT (OUTPATIENT)
Dept: CARDIOLOGY CLINIC | Age: 76
End: 2021-01-22
Payer: MEDICARE

## 2021-01-22 VITALS
SYSTOLIC BLOOD PRESSURE: 104 MMHG | HEIGHT: 70 IN | HEART RATE: 72 BPM | BODY MASS INDEX: 26.69 KG/M2 | OXYGEN SATURATION: 98 % | DIASTOLIC BLOOD PRESSURE: 50 MMHG | RESPIRATION RATE: 16 BRPM | WEIGHT: 186.4 LBS

## 2021-01-22 DIAGNOSIS — E78.00 PURE HYPERCHOLESTEROLEMIA: ICD-10-CM

## 2021-01-22 DIAGNOSIS — Z01.810 PREOPERATIVE CARDIOVASCULAR EXAMINATION: ICD-10-CM

## 2021-01-22 DIAGNOSIS — I25.10 CORONARY ARTERY DISEASE INVOLVING NATIVE CORONARY ARTERY OF NATIVE HEART WITHOUT ANGINA PECTORIS: Primary | ICD-10-CM

## 2021-01-22 DIAGNOSIS — I10 ESSENTIAL HYPERTENSION: ICD-10-CM

## 2021-01-22 DIAGNOSIS — J44.9 CHRONIC OBSTRUCTIVE PULMONARY DISEASE, UNSPECIFIED COPD TYPE (HCC): ICD-10-CM

## 2021-01-22 PROCEDURE — 3023F SPIROM DOC REV: CPT | Performed by: INTERNAL MEDICINE

## 2021-01-22 PROCEDURE — G8417 CALC BMI ABV UP PARAM F/U: HCPCS | Performed by: INTERNAL MEDICINE

## 2021-01-22 PROCEDURE — G8427 DOCREV CUR MEDS BY ELIG CLIN: HCPCS | Performed by: INTERNAL MEDICINE

## 2021-01-22 PROCEDURE — 99214 OFFICE O/P EST MOD 30 MIN: CPT | Performed by: INTERNAL MEDICINE

## 2021-01-22 PROCEDURE — 4040F PNEUMOC VAC/ADMIN/RCVD: CPT | Performed by: INTERNAL MEDICINE

## 2021-01-22 PROCEDURE — 1036F TOBACCO NON-USER: CPT | Performed by: INTERNAL MEDICINE

## 2021-01-22 PROCEDURE — G8484 FLU IMMUNIZE NO ADMIN: HCPCS | Performed by: INTERNAL MEDICINE

## 2021-01-22 PROCEDURE — 3017F COLORECTAL CA SCREEN DOC REV: CPT | Performed by: INTERNAL MEDICINE

## 2021-01-22 PROCEDURE — G8926 SPIRO NO PERF OR DOC: HCPCS | Performed by: INTERNAL MEDICINE

## 2021-01-22 PROCEDURE — 93000 ELECTROCARDIOGRAM COMPLETE: CPT | Performed by: INTERNAL MEDICINE

## 2021-01-22 PROCEDURE — 1123F ACP DISCUSS/DSCN MKR DOCD: CPT | Performed by: INTERNAL MEDICINE

## 2021-01-22 NOTE — PROGRESS NOTES
OUTPATIENT CARDIOLOGY FOLLOW-UP    Name: Jose G Shepherd    Age: 70 y.o. Primary Care Physician: Kasi Holguin DO    Date of Service: 1/22/2021    Chief Complaint: Coronary atherosclerosis, chronic obstructive lung disease, hypertension, preoperative cardiovascular evaluation    Interim History: Since his most recent assessment during hospitalization for his surgical revascularization, the patient is remained stable from a cardiovascular standpoint with mild residual postoperative discomfort and no additional cardiovascular difficulties. He relates mild (functional class II) exertional dyspnea predominately upon climbing stairs likely in part related to his chronic obstructive lung disease. He denies any symptoms of anginal-like chest discomfort or other ischemic equivalents and is noted no manifestations of decompensated left ventricular systolic dysfunction or volume overload nor of an arrhythmia related nature. He additionally has developed urinary outflow tract obstruction in the face of chronic prostatic hypertrophy with present placement of an indwelling urinary catheter and needs of urologic intervention. His mild lightheadedness and relative hypotension have resolved following the reduction of his beta-blocker dosage. Review of Systems: The remainder of a complete multisystem review including consitutional, central nervous, respiratory, circulatory, gastrointestinal, genitourinary, endocrinologic, hematologic, musculoskeletal and psychiatric are negative.     Past Medical History:  Past Medical History:   Diagnosis Date    CAD (coronary artery disease)     HTN (hypertension)     [facility]     Rectal cancer ([facility] [facility]     Tobacco abuse, in remission        Past Surgical History:  Past Surgical History:   Procedure Laterality Date    CORONARY ARTERY BYPASS GRAFT N/A 12/10/2020    CORONARY ARTERY BYPASS, BELINDA performed by Tomás Rodriguez DO at [place] [address] HEMORRHOID SURGERY      INGUINAL HERNIA REPAIR Left     RECTAL SURGERY         Family History:  History reviewed. No pertinent family history. Social History:  Social History     Socioeconomic History    Marital status:      Spouse name: Not on file    Number of children: Not on file    Years of education: Not on file    Highest education level: Not on file   Occupational History    Not on file   Social Needs    Financial resource strain: Not on file    Food insecurity     Worry: Not on file     Inability: Not on file    Transportation needs     Medical: Not on file     Non-medical: Not on file   Tobacco Use    Smoking status: Former Smoker     Packs/day: 1.00     Years: 33.00     Pack years: 33.00     Types: Cigarettes     Quit date: 2000     Years since quittin.0    Smokeless tobacco: Never Used   Substance and Sexual Activity    Alcohol use: Not Currently    Drug use: Never    Sexual activity: Not Currently   Lifestyle    Physical activity     Days per week: Not on file     Minutes per session: Not on file    Stress: Not on file   Relationships    Social connections     Talks on phone: Not on file     Gets together: Not on file     Attends [employer] service: Not on file     Active member of club or organization: Not on file     Attends meetings of clubs or organizations: Not on file     Relationship status: Not on file    Intimate partner violence     Fear of current or ex partner: Not on file     Emotionally abused: Not on file     Physically abused: Not on file     Forced sexual activity: Not on file   Other Topics Concern    Not on file   Social History Narrative    Drinks 1 cup of coffee daily.         Allergies:  No Known Allergies    Current Medications:  Current Outpatient Medications   Medication Sig Dispense Refill    metoprolol tartrate (LOPRESSOR) 25 MG tablet Take 0.5 tablets by mouth 2 times daily 60 tablet 3    aspirin 81 MG EC tablet Take 1 tablet by mouth daily 30 tablet 3    atorvastatin (LIPITOR) 40 MG tablet Take 1 tablet by mouth nightly 30 tablet 3    docusate sodium (COLACE) 100 MG capsule Take 1 capsule by mouth 2 times daily as needed for Constipation (constipation) 20 capsule 0    clopidogrel (PLAVIX) 75 MG tablet Take 1 tablet by mouth daily 30 tablet 3    tamsulosin (FLOMAX) 0.4 MG capsule Take 1 capsule by mouth daily 30 capsule 0     No current facility-administered medications for this visit. Physical Exam:  BP (!) 104/50 (Site: Right Upper Arm, Position: Sitting, Cuff Size: Medium Adult)   Pulse 72   Resp 16   Ht 5' 10\" (1.778 m)   Wt 186 lb 6.4 oz (84.6 kg)   SpO2 98%   BMI 26.75 kg/m²   Wt Readings from Last 3 Encounters:   01/22/21 186 lb 6.4 oz (84.6 kg)   01/12/21 184 lb (83.5 kg)   12/14/20 200 lb 3.2 oz (90.8 kg)     The patient is awake, alert and in no discomfort or distress. No gross musculoskeletal deformity is present. No significant skin or nail changes are present. Gross examination of head, eyes, nose and throat are negative. Jugular venous pressure is normal and no carotid bruits are present. Normal respiratory effort is noted with no accessory muscle usage present. Lung fields are clear to ascultation. Cardiac examination is notable for a regular rate and rhythm with no palpable thrill. No gallop rhythm or cardiac murmur are identified. A benign abdominal examination is present with no masses or organomegaly. Intact pulses are present throughout all extremities and no peripheral edema is present. No focal neurologic deficits are present.     Laboratory Tests:  Lab Results   Component Value Date    CREATININE 0.8 12/14/2020    BUN 16 12/14/2020     12/14/2020    K 3.8 12/14/2020    CL 99 12/14/2020    CO2 29 12/14/2020     No results found for: BNP  Lab Results   Component Value Date    WBC 8.7 12/14/2020    RBC 2.71 12/14/2020    HGB 8.3 12/14/2020    HCT 25.4 12/14/2020    MCV 93.7 12/14/2020    MCH 30.6 12/14/2020    MCHC 32.7 12/14/2020    RDW 12.7 12/14/2020     12/14/2020    MPV 10.5 12/14/2020     No results for input(s): ALKPHOS, ALT, AST, PROT, BILITOT, BILIDIR, LABALBU in the last 72 hours. Lab Results   Component Value Date    MG 2.9 12/10/2020     Lab Results   Component Value Date    PROTIME 17.6 12/10/2020    INR 1.5 12/10/2020     No results found for: TSH  No components found for: CHLPL  Lab Results   Component Value Date    TRIG 85 12/07/2020     Lab Results   Component Value Date    HDL 44 12/07/2020     Lab Results   Component Value Date    LDLCALC 104 (H) 12/07/2020       Cardiac Tests:  ECG: A resting electrocardiogram serrated evidence of sinus rhythm with nonspecific ST changes      ASSESSMENT / PLAN: On a clinical basis, the patient presently appears compensated from a cardiovascular standpoint in the face of his coronary atherosclerosis with recent non-ST elevation myocardial infarction and subsequent surgical revascularization. He is appropriately recovering without perioperative cardiovascular difficulties and is awaiting entry into cardiovascular rehabilitation in part potentially delayed by his development of urinary outflow obstruction and needs of urologic intervention. In this regard, he is presently stable from a cardiovascular standpoint to proceed and will require a temporary interruption of his antiplatelet therapy, both out of its aspirin and clopidogrel components, 5 days prior to the procedure and resume once appropriate perioperative hemostasis has been achieved. I have extensively discussed this with he and his wife at the time of his assessment. Continued aggressive risk factor modification of blood pressure and serum lipids remain essential to reducing risk of future atherosclerotic development.  I presently plan his clinical reassessment in approximately 6 months and would happily reevaluate him in the interim should additional cardiovascular difficulties or concerns arise.    The patient's current medication list, allergies, problem list and results of all previously ordered testing were reviewed at today's visit. Follow-up office visit in 6 months      Note: This report was completed using computerized voice recognition software. Every effort has been made to ensure accuracy, however; and invert and computerized transcription errors may be present. Idris Cloud.  Armida Ermelinda, [address] [place] [address]    An electronic copy of this follow-up progress note was forwarded to Dr. Jennifer Ramirez

## 2021-02-07 ENCOUNTER — PREP FOR PROCEDURE (OUTPATIENT)
Dept: UROLOGY | Age: 76
End: 2021-02-07

## 2021-02-07 DIAGNOSIS — N40.0 BENIGN PROSTATIC HYPERPLASIA, UNSPECIFIED WHETHER LOWER URINARY TRACT SYMPTOMS PRESENT: Primary | ICD-10-CM

## 2021-02-07 RX ORDER — CIPROFLOXACIN 2 MG/ML
400 INJECTION, SOLUTION INTRAVENOUS
Status: CANCELLED | OUTPATIENT
Start: 2021-02-07 | End: 2021-02-07

## 2021-02-07 RX ORDER — SODIUM CHLORIDE 0.9 % (FLUSH) 0.9 %
10 SYRINGE (ML) INJECTION EVERY 12 HOURS SCHEDULED
Status: CANCELLED | OUTPATIENT
Start: 2021-02-07

## 2021-02-07 RX ORDER — SODIUM CHLORIDE 0.9 % (FLUSH) 0.9 %
10 SYRINGE (ML) INJECTION PRN
Status: CANCELLED | OUTPATIENT
Start: 2021-02-07

## 2021-02-08 ENCOUNTER — HOSPITAL ENCOUNTER (OUTPATIENT)
Age: 76
Discharge: HOME | End: 2021-02-10
Payer: MEDICARE

## 2021-02-08 DIAGNOSIS — Z01.818 PREOP TESTING: ICD-10-CM

## 2021-02-08 LAB — SARS-COV-2, PCR: NOT DETECTED

## 2021-02-08 PROCEDURE — U0003 INFECTIOUS AGENT DETECTION BY NUCLEIC ACID (DNA OR RNA); SEVERE ACUTE RESPIRATORY SYNDROME CORONAVIRUS 2 (SARS-COV-2) (CORONAVIRUS DISEASE [COVID-19]), AMPLIFIED PROBE TECHNIQUE, MAKING USE OF HIGH THROUGHPUT TECHNOLOGIES AS DESCRIBED BY CMS-2020-01-R: HCPCS

## 2021-02-10 NOTE — PROGRESS NOTES
Have you been tested for COVID  Yes           Have you been told you were positive for COVID No  Have you had any known exposure to someone that is positive for COVID No  Do you have a cough                   No              Do you have shortness of breath No                 Do you have a sore throat            No                Are you having chills                    No                Are you having muscle aches. No                    Please come to the hospital wearing a mask and have your significant other wear a mask as well. Both of you should check your temperature before leaving to come here,  if it is 100 or higher please call [phone] for instruction.

## 2021-02-10 NOTE — PROGRESS NOTES
[facility] PRE-ADMISSION TESTING INSTRUCTIONS    The Preadmission Testing patient is instructed accordingly using the following criteria (check applicable):    ARRIVAL INSTRUCTIONS:  [x] Parking the day of Surgery is located in the Main Entrance lot. Upon entering the door, make an immediate right to the surgery reception desk    [x] Bring photo ID and insurance card    [x] Bring in a copy of Living will or Durable Power of  papers. [x] Please be sure to arrange for responsible adult to provide transportation to and from the hospital    [x] Please arrange for responsible adult to be with you for the 24 hour period post procedure due to having anesthesia      GENERAL INSTRUCTIONS:    [x] Nothing by mouth after midnight, including gum, candy, mints or water    [x] You may brush your teeth, but do not swallow any water    [x] Take medications as instructed with 1-2 oz of water    [x] Stop herbal supplements and vitamins 5 days prior to procedure    [x] Follow preop dosing of blood thinners per physician instructions    [] Take 1/2 dose of evening insulin, but no insulin after midnight    [] No oral diabetic medications after midnight    [] If diabetic and have low blood sugar or feel symptomatic, take 1-2oz apple juice only    [] Bring inhalers day of surgery    [] Bring C-PAP/ Bi-Pap day of surgery    [] Bring urine specimen day of surgery    [x] Shower or bath with soap, lather and rinse well, AM of Surgery, no lotion, powders or creams to surgical site    [] Follow bowel prep as instructed per surgeon    [x] No tobacco products within 24 hours of surgery     [x] No alcohol or illegal drug use within 24 hours of surgery.     [x] Jewelry, body piercing's, eyeglasses, contact lenses and dentures are not permitted into surgery (bring cases)      [] Please do not wear any nail polish, make up or hair products on the day of surgery    [x] You can expect a call the business day prior to procedure to notify you if your arrival time changes    [x] If you receive a survey after surgery we would greatly appreciate your comments    [] Parent/guardian of a minor must accompany their child and remain on the premises  the entire time they are under our care     [] Pediatric patients may bring favorite toy, blanket or comfort item with them    [] A caregiver or family member must remain with the patient during their stay if they are mentally handicapped, have dementia, disoriented or unable to use a call light or would be a safety concern if left unattended    [x] Please notify surgeon if you develop any illness between now and time of surgery (cold, cough, sore throat, fever, nausea, vomiting) or any signs of infections  including skin, wounds, and dental.    [x]  The Outpatient Pharmacy is available to fill your prescription here on your day of surgery, ask your preop nurse for details    [] Other instructions    EDUCATIONAL MATERIALS PROVIDED:    [] PAT Preoperative Education Packet/Booklet     [] Medication List    [] Transfusion bracelet applied with instructions    [] Shower with soap, lather and rinse well, and use CHG wipes provided the evening before surgery as instructed    [] Incentive spirometer with instructions

## 2021-02-11 ENCOUNTER — ANESTHESIA EVENT (OUTPATIENT)
Dept: OPERATING ROOM | Age: 76
End: 2021-02-11
Payer: MEDICARE

## 2021-02-12 ENCOUNTER — HOSPITAL ENCOUNTER (OUTPATIENT)
Dept: GENERAL RADIOLOGY | Age: 76
Setting detail: OUTPATIENT SURGERY
Discharge: HOME | End: 2021-02-14
Attending: UROLOGY
Payer: MEDICARE

## 2021-02-12 ENCOUNTER — ANESTHESIA (OUTPATIENT)
Dept: OPERATING ROOM | Age: 76
End: 2021-02-12
Payer: MEDICARE

## 2021-02-12 ENCOUNTER — HOSPITAL ENCOUNTER (OUTPATIENT)
Age: 76
Setting detail: OBSERVATION
Discharge: HOME | End: 2021-02-13
Attending: UROLOGY | Admitting: UROLOGY
Payer: MEDICARE

## 2021-02-12 VITALS
DIASTOLIC BLOOD PRESSURE: 61 MMHG | OXYGEN SATURATION: 100 % | SYSTOLIC BLOOD PRESSURE: 132 MMHG | RESPIRATION RATE: 3 BRPM

## 2021-02-12 DIAGNOSIS — R52 PAIN: ICD-10-CM

## 2021-02-12 DIAGNOSIS — G89.18 POST-OP PAIN: ICD-10-CM

## 2021-02-12 DIAGNOSIS — Z01.818 PREOP TESTING: Primary | ICD-10-CM

## 2021-02-12 PROBLEM — N13.8 BPH WITH URINARY OBSTRUCTION: Status: ACTIVE | Noted: 2021-02-12

## 2021-02-12 PROBLEM — N40.1 BPH WITH URINARY OBSTRUCTION: Status: ACTIVE | Noted: 2021-02-12

## 2021-02-12 LAB
ANION GAP SERPL CALCULATED.3IONS-SCNC: 9 MMOL/L (ref 7–16)
BUN BLDV-MCNC: 17 MG/DL (ref 8–23)
CALCIUM SERPL-MCNC: 9.5 MG/DL (ref 8.6–10.2)
CHLORIDE BLD-SCNC: 107 MMOL/L (ref 98–107)
CO2: 24 MMOL/L (ref 22–29)
CREAT SERPL-MCNC: 0.9 MG/DL (ref 0.7–1.2)
GFR AFRICAN AMERICAN: >60
GFR NON-AFRICAN AMERICAN: >60 ML/MIN/1.73
GLUCOSE BLD-MCNC: 103 MG/DL (ref 74–99)
HCT VFR BLD CALC: 38.7 % (ref 37–54)
HEMOGLOBIN: 11.9 G/DL (ref 12.5–16.5)
MCH RBC QN AUTO: 29.5 PG (ref 26–35)
MCHC RBC AUTO-ENTMCNC: 30.7 % (ref 32–34.5)
MCV RBC AUTO: 96 FL (ref 80–99.9)
PDW BLD-RTO: 13.2 FL (ref 11.5–15)
PLATELET # BLD: 335 E9/L (ref 130–450)
PMV BLD AUTO: 10.2 FL (ref 7–12)
POTASSIUM SERPL-SCNC: 4.9 MMOL/L (ref 3.5–5)
RBC # BLD: 4.03 E12/L (ref 3.8–5.8)
SODIUM BLD-SCNC: 140 MMOL/L (ref 132–146)
WBC # BLD: 10.3 E9/L (ref 4.5–11.5)

## 2021-02-12 PROCEDURE — 6360000002 HC RX W HCPCS

## 2021-02-12 PROCEDURE — 2580000003 HC RX 258

## 2021-02-12 PROCEDURE — 51700 IRRIGATION OF BLADDER: CPT

## 2021-02-12 PROCEDURE — 3700000000 HC ANESTHESIA ATTENDED CARE: Performed by: UROLOGY

## 2021-02-12 PROCEDURE — 2720000010 HC SURG SUPPLY STERILE: Performed by: UROLOGY

## 2021-02-12 PROCEDURE — 3700000001 HC ADD 15 MINUTES (ANESTHESIA): Performed by: UROLOGY

## 2021-02-12 PROCEDURE — G0378 HOSPITAL OBSERVATION PER HR: HCPCS

## 2021-02-12 PROCEDURE — 36415 COLL VENOUS BLD VENIPUNCTURE: CPT

## 2021-02-12 PROCEDURE — 2500000003 HC RX 250 WO HCPCS: Performed by: UROLOGY

## 2021-02-12 PROCEDURE — 80048 BASIC METABOLIC PNL TOTAL CA: CPT

## 2021-02-12 PROCEDURE — 7100000000 HC PACU RECOVERY - FIRST 15 MIN: Performed by: UROLOGY

## 2021-02-12 PROCEDURE — C1769 GUIDE WIRE: HCPCS | Performed by: UROLOGY

## 2021-02-12 PROCEDURE — 3600000013 HC SURGERY LEVEL 3 ADDTL 15MIN: Performed by: UROLOGY

## 2021-02-12 PROCEDURE — 2500000003 HC RX 250 WO HCPCS

## 2021-02-12 PROCEDURE — C1887 CATHETER, GUIDING: HCPCS | Performed by: UROLOGY

## 2021-02-12 PROCEDURE — 88305 TISSUE EXAM BY PATHOLOGIST: CPT

## 2021-02-12 PROCEDURE — C1758 CATHETER, URETERAL: HCPCS | Performed by: UROLOGY

## 2021-02-12 PROCEDURE — 6360000004 HC RX CONTRAST MEDICATION: Performed by: UROLOGY

## 2021-02-12 PROCEDURE — 6370000000 HC RX 637 (ALT 250 FOR IP): Performed by: UROLOGY

## 2021-02-12 PROCEDURE — 85027 COMPLETE CBC AUTOMATED: CPT

## 2021-02-12 PROCEDURE — 6360000002 HC RX W HCPCS: Performed by: NURSE PRACTITIONER

## 2021-02-12 PROCEDURE — 7100000001 HC PACU RECOVERY - ADDTL 15 MIN: Performed by: UROLOGY

## 2021-02-12 PROCEDURE — 74420 UROGRAPHY RTRGR +-KUB: CPT

## 2021-02-12 PROCEDURE — 6360000002 HC RX W HCPCS: Performed by: ANESTHESIOLOGY

## 2021-02-12 PROCEDURE — 3600000003 HC SURGERY LEVEL 3 BASE: Performed by: UROLOGY

## 2021-02-12 PROCEDURE — 2709999900 HC NON-CHARGEABLE SUPPLY: Performed by: UROLOGY

## 2021-02-12 PROCEDURE — 6360000002 HC RX W HCPCS: Performed by: UROLOGY

## 2021-02-12 RX ORDER — CIPROFLOXACIN 2 MG/ML
400 INJECTION, SOLUTION INTRAVENOUS EVERY 12 HOURS
Status: COMPLETED | OUTPATIENT
Start: 2021-02-12 | End: 2021-02-13

## 2021-02-12 RX ORDER — PROMETHAZINE HYDROCHLORIDE 25 MG/ML
6.25 INJECTION, SOLUTION INTRAMUSCULAR; INTRAVENOUS PRN
Status: DISCONTINUED | OUTPATIENT
Start: 2021-02-12 | End: 2021-02-12 | Stop reason: HOSPADM

## 2021-02-12 RX ORDER — SODIUM CHLORIDE 0.9 % (FLUSH) 0.9 %
10 SYRINGE (ML) INJECTION EVERY 12 HOURS SCHEDULED
Status: DISCONTINUED | OUTPATIENT
Start: 2021-02-12 | End: 2021-02-13 | Stop reason: HOSPADM

## 2021-02-12 RX ORDER — DIPHENHYDRAMINE HYDROCHLORIDE 50 MG/ML
12.5 INJECTION INTRAMUSCULAR; INTRAVENOUS
Status: DISCONTINUED | OUTPATIENT
Start: 2021-02-12 | End: 2021-02-12 | Stop reason: HOSPADM

## 2021-02-12 RX ORDER — SODIUM CHLORIDE 0.9 % (FLUSH) 0.9 %
10 SYRINGE (ML) INJECTION PRN
Status: DISCONTINUED | OUTPATIENT
Start: 2021-02-12 | End: 2021-02-13 | Stop reason: HOSPADM

## 2021-02-12 RX ORDER — OXYCODONE HYDROCHLORIDE AND ACETAMINOPHEN 5; 325 MG/1; MG/1
1 TABLET ORAL PRN
Status: DISCONTINUED | OUTPATIENT
Start: 2021-02-12 | End: 2021-02-12 | Stop reason: HOSPADM

## 2021-02-12 RX ORDER — CIPROFLOXACIN 2 MG/ML
400 INJECTION, SOLUTION INTRAVENOUS
Status: COMPLETED | OUTPATIENT
Start: 2021-02-12 | End: 2021-02-12

## 2021-02-12 RX ORDER — MEPERIDINE HYDROCHLORIDE 25 MG/ML
12.5 INJECTION INTRAMUSCULAR; INTRAVENOUS; SUBCUTANEOUS EVERY 10 MIN PRN
Status: DISCONTINUED | OUTPATIENT
Start: 2021-02-12 | End: 2021-02-12 | Stop reason: HOSPADM

## 2021-02-12 RX ORDER — MIDAZOLAM HYDROCHLORIDE 1 MG/ML
INJECTION INTRAMUSCULAR; INTRAVENOUS PRN
Status: DISCONTINUED | OUTPATIENT
Start: 2021-02-12 | End: 2021-02-12 | Stop reason: SDUPTHER

## 2021-02-12 RX ORDER — FENTANYL CITRATE 50 UG/ML
INJECTION, SOLUTION INTRAMUSCULAR; INTRAVENOUS PRN
Status: DISCONTINUED | OUTPATIENT
Start: 2021-02-12 | End: 2021-02-12 | Stop reason: SDUPTHER

## 2021-02-12 RX ORDER — DEXTROSE, SODIUM CHLORIDE, AND POTASSIUM CHLORIDE 5; .45; .15 G/100ML; G/100ML; G/100ML
INJECTION INTRAVENOUS CONTINUOUS
Status: DISCONTINUED | OUTPATIENT
Start: 2021-02-12 | End: 2021-02-13 | Stop reason: HOSPADM

## 2021-02-12 RX ORDER — LIDOCAINE HYDROCHLORIDE 10 MG/ML
INJECTION, SOLUTION EPIDURAL; INFILTRATION; INTRACAUDAL; PERINEURAL PRN
Status: DISCONTINUED | OUTPATIENT
Start: 2021-02-12 | End: 2021-02-12 | Stop reason: SDUPTHER

## 2021-02-12 RX ORDER — DOCUSATE SODIUM 100 MG/1
100 CAPSULE, LIQUID FILLED ORAL 2 TIMES DAILY PRN
Status: DISCONTINUED | OUTPATIENT
Start: 2021-02-12 | End: 2021-02-13 | Stop reason: HOSPADM

## 2021-02-12 RX ORDER — FENTANYL CITRATE 50 UG/ML
25 INJECTION, SOLUTION INTRAMUSCULAR; INTRAVENOUS EVERY 5 MIN PRN
Status: DISCONTINUED | OUTPATIENT
Start: 2021-02-12 | End: 2021-02-12 | Stop reason: HOSPADM

## 2021-02-12 RX ORDER — ATORVASTATIN CALCIUM 40 MG/1
40 TABLET, FILM COATED ORAL NIGHTLY
Status: DISCONTINUED | OUTPATIENT
Start: 2021-02-12 | End: 2021-02-13 | Stop reason: HOSPADM

## 2021-02-12 RX ORDER — SODIUM CHLORIDE 0.9 % (FLUSH) 0.9 %
10 SYRINGE (ML) INJECTION EVERY 12 HOURS SCHEDULED
Status: DISCONTINUED | OUTPATIENT
Start: 2021-02-12 | End: 2021-02-12 | Stop reason: HOSPADM

## 2021-02-12 RX ORDER — SODIUM CHLORIDE 9 MG/ML
INJECTION, SOLUTION INTRAVENOUS CONTINUOUS PRN
Status: DISCONTINUED | OUTPATIENT
Start: 2021-02-12 | End: 2021-02-12 | Stop reason: SDUPTHER

## 2021-02-12 RX ORDER — EPHEDRINE SULFATE/0.9% NACL/PF 50 MG/5 ML
SYRINGE (ML) INTRAVENOUS PRN
Status: DISCONTINUED | OUTPATIENT
Start: 2021-02-12 | End: 2021-02-12 | Stop reason: SDUPTHER

## 2021-02-12 RX ORDER — PROPOFOL 10 MG/ML
INJECTION, EMULSION INTRAVENOUS PRN
Status: DISCONTINUED | OUTPATIENT
Start: 2021-02-12 | End: 2021-02-12 | Stop reason: SDUPTHER

## 2021-02-12 RX ORDER — SODIUM CHLORIDE 0.9 % (FLUSH) 0.9 %
10 SYRINGE (ML) INJECTION PRN
Status: DISCONTINUED | OUTPATIENT
Start: 2021-02-12 | End: 2021-02-12 | Stop reason: HOSPADM

## 2021-02-12 RX ORDER — ASPIRIN 81 MG/1
81 TABLET ORAL DAILY
Status: DISCONTINUED | OUTPATIENT
Start: 2021-02-12 | End: 2021-02-13 | Stop reason: HOSPADM

## 2021-02-12 RX ORDER — ONDANSETRON 2 MG/ML
INJECTION INTRAMUSCULAR; INTRAVENOUS PRN
Status: DISCONTINUED | OUTPATIENT
Start: 2021-02-12 | End: 2021-02-12 | Stop reason: SDUPTHER

## 2021-02-12 RX ADMIN — CIPROFLOXACIN 400 MG: 2 INJECTION, SOLUTION INTRAVENOUS at 10:30

## 2021-02-12 RX ADMIN — PROPOFOL 160 MG: 10 INJECTION, EMULSION INTRAVENOUS at 11:16

## 2021-02-12 RX ADMIN — POTASSIUM CHLORIDE, DEXTROSE MONOHYDRATE AND SODIUM CHLORIDE: 150; 5; 450 INJECTION, SOLUTION INTRAVENOUS at 15:43

## 2021-02-12 RX ADMIN — METOPROLOL TARTRATE 12.5 MG: 25 TABLET, FILM COATED ORAL at 22:33

## 2021-02-12 RX ADMIN — Medication 10 MG: at 11:24

## 2021-02-12 RX ADMIN — CIPROFLOXACIN 400 MG: 2 INJECTION, SOLUTION INTRAVENOUS at 22:34

## 2021-02-12 RX ADMIN — LIDOCAINE HYDROCHLORIDE 60 MG: 10 INJECTION, SOLUTION EPIDURAL; INFILTRATION; INTRACAUDAL; PERINEURAL at 11:16

## 2021-02-12 RX ADMIN — FENTANYL CITRATE 50 MCG: 50 INJECTION, SOLUTION INTRAMUSCULAR; INTRAVENOUS at 11:16

## 2021-02-12 RX ADMIN — ATORVASTATIN CALCIUM 40 MG: 40 TABLET, FILM COATED ORAL at 22:33

## 2021-02-12 RX ADMIN — MEPERIDINE HYDROCHLORIDE 12.5 MG: 25 INJECTION, SOLUTION INTRAMUSCULAR; INTRAVENOUS; SUBCUTANEOUS at 13:00

## 2021-02-12 RX ADMIN — ONDANSETRON 4 MG: 2 INJECTION INTRAMUSCULAR; INTRAVENOUS at 11:48

## 2021-02-12 RX ADMIN — SODIUM CHLORIDE: 9 INJECTION, SOLUTION INTRAVENOUS at 11:03

## 2021-02-12 RX ADMIN — ASPIRIN 81 MG: 81 TABLET, COATED ORAL at 15:43

## 2021-02-12 RX ADMIN — MIDAZOLAM 1 MG: 1 INJECTION INTRAMUSCULAR; INTRAVENOUS at 11:12

## 2021-02-12 ASSESSMENT — PULMONARY FUNCTION TESTS
PIF_VALUE: 14
PIF_VALUE: 15
PIF_VALUE: 2
PIF_VALUE: 2
PIF_VALUE: 15
PIF_VALUE: 15
PIF_VALUE: 1
PIF_VALUE: 15
PIF_VALUE: 13
PIF_VALUE: 15
PIF_VALUE: 14
PIF_VALUE: 17
PIF_VALUE: 2
PIF_VALUE: 14
PIF_VALUE: 13
PIF_VALUE: 15
PIF_VALUE: 14

## 2021-02-12 ASSESSMENT — PAIN SCALES - GENERAL
PAINLEVEL_OUTOF10: 0
PAINLEVEL_OUTOF10: 0

## 2021-02-12 ASSESSMENT — LIFESTYLE VARIABLES: SMOKING_STATUS: 0

## 2021-02-12 ASSESSMENT — PAIN - FUNCTIONAL ASSESSMENT: PAIN_FUNCTIONAL_ASSESSMENT: 0-10

## 2021-02-12 NOTE — ANESTHESIA PRE PROCEDURE
elevated myocardial infarction) ([facility] [address] I21.4    Coronary artery disease involving native coronary artery of native heart without angina pectoris I25.10    Pure hypercholesterolemia E78.00    BPH with urinary obstruction N40.1, N13.8       Past Medical History:        Diagnosis Date    CAD (coronary artery disease)     HTN (hypertension)     Hyperlipidemia     NSTEMI (non-ST elevated myocardial infarction) [facility] [address] [facility]     Rectal cancer ([facility]     Tobacco abuse, in remission        Past Surgical History:        Procedure Laterality Date    COLONOSCOPY      CORONARY ARTERY BYPASS GRAFT N/A 12/10/2020    CORONARY ARTERY BYPASS, BELINDA performed by Susannah De Luna DO at [address] [facility] Left     RECTAL SURGERY         Social History:    Social History     Tobacco Use    Smoking status: Former Smoker     Packs/day: 1.00     Years: 33.00     Pack years: 33.00     Types: Cigarettes     Quit date: 2000     Years since quittin.1    Smokeless tobacco: Never Used   Substance Use Topics    Alcohol use: Not Currently                                Counseling given: Not Answered      Vital Signs (Current):   Vitals:    02/10/21 1436 21 1014   BP:  122/70   Pulse:  67   Resp:  18   Temp:  96.6 °F (35.9 °C)   TempSrc:  Temporal   SpO2:  99%   Weight: 181 lb (82.1 kg) 181 lb (82.1 kg)   Height: 5' 10\" (1.778 m) 5' 10\" (1.778 m)                                              BP Readings from Last 3 Encounters:   21 122/70   21 (!) 104/50   21 99/60       NPO Status: Time of last liquid consumption:                         Time of last solid consumption:                         Date of last liquid consumption: 21                        Date of last solid food consumption: 21    BMI:   Wt Readings from Last 3 Encounters:   21 181 lb (82.1 kg)   21 186 lb 6.4 oz (84.6 kg)   21 184 lb (83.5 kg)     Body mass index is 25.97 kg/m².    CBC:   Lab Results   Component Value Date    WBC 10.3 02/12/2021    RBC 4.03 02/12/2021    HGB 11.9 02/12/2021    HCT 38.7 02/12/2021    MCV 96.0 02/12/2021    RDW 13.2 02/12/2021     02/12/2021       CMP:   Lab Results   Component Value Date     12/14/2020    K 3.8 12/14/2020    K 3.9 12/07/2020    CL 99 12/14/2020    CO2 29 12/14/2020    BUN 16 12/14/2020    CREATININE 0.8 12/14/2020    GFRAA >60 12/14/2020    LABGLOM >60 12/14/2020    GLUCOSE 102 12/14/2020    PROT 7.4 12/08/2020    CALCIUM 8.6 12/14/2020    BILITOT 0.7 12/08/2020    ALKPHOS 158 12/08/2020    AST 20 12/08/2020    ALT 27 12/08/2020       POC Tests: No results for input(s): POCGLU, POCNA, POCK, POCCL, POCBUN, POCHEMO, POCHCT in the last 72 hours.     Coags:   Lab Results   Component Value Date    PROTIME 17.6 12/10/2020    INR 1.5 12/10/2020    APTT 28.1 12/10/2020       HCG (If Applicable): No results found for: PREGTESTUR, PREGSERUM, HCG, HCGQUANT     ABGs:   Lab Results   Component Value Date    PO2ART 228.8 12/10/2020    EDH2SQD 38.5 12/10/2020    YFL4DGB 23.2 12/10/2020        Type & Screen (If Applicable):  No results found for: LABABO, LABRH    Drug/Infectious Status (If Applicable):  No results found for: HIV, HEPCAB    COVID-19 Screening (If Applicable):   Lab Results   Component Value Date    COVID19 Not Detected 02/08/2021         Anesthesia Evaluation  Patient summary reviewed and Nursing notes reviewed no history of anesthetic complications:   Airway: Mallampati: II  TM distance: >3 FB   Neck ROM: full  Mouth opening: > = 3 FB Dental:    (+) edentulous      Pulmonary:Negative Pulmonary ROS breath sounds clear to auscultation      (-) not a current smoker                           Cardiovascular:  Exercise tolerance: good (>4 METS),   (+) hypertension:, past MI:, CAD:, CABG/stent:, hyperlipidemia        Rhythm: regular  Rate: normal           Beta Blocker:  Dose within 24 Hrs         Neuro/Psych:   Negative Neuro/Psych ROS              GI/Hepatic/Renal:            ROS comment: BPH. Endo/Other:    (+) blood dyscrasia: anticoagulation therapy:., malignancy/cancer. Abdominal:           Vascular: negative vascular ROS. Anesthesia Plan      general     ASA 3       Induction: intravenous. Anesthetic plan and risks discussed with patient and spouse.                       Sobeida Levy MD   2/12/2021

## 2021-02-12 NOTE — ANESTHESIA POSTPROCEDURE EVALUATION
Department of Anesthesiology  Postprocedure Note    Patient: Sharon Portillo  MRN: [id number]  YOB: 1945  Date of evaluation: 2/12/2021  Time:  2:27 PM     Procedure Summary     Date: 02/12/21 Room / Location: [facility] 06 / [address] [place] [address]    Anesthesia Start: 1114 Anesthesia Stop: 1027    Procedure: CYSTOSCOPY RETROGRADE PYELOGRAM PLASMA LOOP TRANSURETHRAL RESECTION PROSTATE (N/A ) Diagnosis: (BPH)    Surgeons: Malcom Goss DO Responsible Provider: Radha Patrick MD    Anesthesia Type: general ASA Status: 3          Anesthesia Type: general    Juan Antonio Phase I: Juan Antonio Score: 10    Juan Antonio Phase II:      Last vitals: Reviewed and per EMR flowsheets.        Anesthesia Post Evaluation    Patient location during evaluation: PACU  Patient participation: complete - patient participated  Level of consciousness: awake and alert  Airway patency: patent  Nausea & Vomiting: no vomiting and no nausea  Complications: no  Cardiovascular status: blood pressure returned to baseline  Respiratory status: acceptable  Hydration status: euvolemic

## 2021-02-13 VITALS
BODY MASS INDEX: 25.91 KG/M2 | WEIGHT: 181 LBS | RESPIRATION RATE: 16 BRPM | HEIGHT: 70 IN | HEART RATE: 72 BPM | OXYGEN SATURATION: 96 % | DIASTOLIC BLOOD PRESSURE: 56 MMHG | TEMPERATURE: 98.7 F | SYSTOLIC BLOOD PRESSURE: 102 MMHG

## 2021-02-13 LAB
HCT VFR BLD CALC: 29.7 % (ref 37–54)
HEMOGLOBIN: 9.4 G/DL (ref 12.5–16.5)
MCH RBC QN AUTO: 29.8 PG (ref 26–35)
MCHC RBC AUTO-ENTMCNC: 31.6 % (ref 32–34.5)
MCV RBC AUTO: 94.3 FL (ref 80–99.9)
PDW BLD-RTO: 13.7 FL (ref 11.5–15)
PLATELET # BLD: 224 E9/L (ref 130–450)
PMV BLD AUTO: 10.2 FL (ref 7–12)
RBC # BLD: 3.15 E12/L (ref 3.8–5.8)
WBC # BLD: 11.3 E9/L (ref 4.5–11.5)

## 2021-02-13 PROCEDURE — 6370000000 HC RX 637 (ALT 250 FOR IP): Performed by: UROLOGY

## 2021-02-13 PROCEDURE — 2500000003 HC RX 250 WO HCPCS: Performed by: UROLOGY

## 2021-02-13 PROCEDURE — G0378 HOSPITAL OBSERVATION PER HR: HCPCS

## 2021-02-13 PROCEDURE — 85027 COMPLETE CBC AUTOMATED: CPT

## 2021-02-13 PROCEDURE — 6360000002 HC RX W HCPCS: Performed by: UROLOGY

## 2021-02-13 RX ORDER — OXYCODONE HYDROCHLORIDE AND ACETAMINOPHEN 5; 325 MG/1; MG/1
1 TABLET ORAL EVERY 4 HOURS PRN
Qty: 10 TABLET | Refills: 0 | Status: SHIPPED | OUTPATIENT
Start: 2021-02-13 | End: 2021-02-20

## 2021-02-13 RX ORDER — CIPROFLOXACIN 500 MG/1
500 TABLET, FILM COATED ORAL 2 TIMES DAILY
Qty: 10 TABLET | Refills: 0 | Status: SHIPPED | OUTPATIENT
Start: 2021-02-13 | End: 2021-02-18

## 2021-02-13 RX ADMIN — POTASSIUM CHLORIDE, DEXTROSE MONOHYDRATE AND SODIUM CHLORIDE: 150; 5; 450 INJECTION, SOLUTION INTRAVENOUS at 04:16

## 2021-02-13 RX ADMIN — METOPROLOL TARTRATE 12.5 MG: 25 TABLET, FILM COATED ORAL at 09:24

## 2021-02-13 RX ADMIN — ASPIRIN 81 MG: 81 TABLET, COATED ORAL at 09:23

## 2021-02-13 RX ADMIN — CIPROFLOXACIN 400 MG: 2 INJECTION, SOLUTION INTRAVENOUS at 10:30

## 2021-02-13 ASSESSMENT — PAIN SCALES - GENERAL: PAINLEVEL_OUTOF10: 0

## 2021-02-13 NOTE — PROGRESS NOTES
Patient complaining of itching of right arm while ciprofloxacin is infusing. Per pharmacist, this is not a normal side effect. Dr. Logan Schmid made aware and per  stop the infusion and have patient NOT take the prescription of ciprofloxacin that was sent to his pharmacy.

## 2021-02-13 NOTE — OP NOTE
[address] [address]                                OPERATIVE REPORT    PATIENT NAME: Ivan Lewis                    :        1945  MED REC NO:   [id number]                            ROOM:       7259  ACCOUNT NO:   [de-identified]                           ADMIT DATE: 2021  PROVIDER:     Maxx Ramirez DO    DATE OF PROCEDURE:  2021    PREOPERATIVE DIAGNOSES:  1. BPH.  2.  Bladder outlet obstruction. 3.  Urinary retention. 4.  History of squamous cell of the anus, post excisional radiation. POSTOPERATIVE DIAGNOSES:  1. BPH.  2.  Bladder outlet obstruction. 3.  Urinary retention. 4.  History of squamous cell of the anus, post excisional radiation. PROCEDURES PERFORMED:  The patient had,  1. Cystoscopy. 2.  Bilateral retrograde pyelograms done under fluoroscopy. 3.  Plasma loop transurethral resection of the prostate. SURGEON:  Cole Ramirez DO    ESTIMATED BLOOD LOSS:  100 mL. SPECIMEN:  Prostate. Preoperative antibiotics were administered. HISTORY ON THIS PATIENT:  This is a pleasant 61-year-old male that  presents to [facility] on the  aforementioned date with the above diagnosis. In the outpatient  setting, the patient was consented for the above proposed surgical  procedure. The patient understood the risks, the benefits, the  alternatives of the proposed surgical procedure and consented to have  the procedure done on the aforementioned date. Please note the patient  was given a voiding trial and he had failed it. He does have a history,  as I find today, of squamous cell of the anus and is status post  intervention. I did a rectal examination in the office that did not  demonstrate any abnormality, but he did not convey this previous history  which his wife has informed me today.   I had recommended getting a colonoscopy. He has been followed quite closely. He had failed  medications, failed voiding trials and ultimately presents for the above  proposed surgical procedure. DESCRIPTION OF PROCEDURE:  This pleasant 26-year-old  male was  brought to the operating room #6 at [facility], placed in the supine position and induced with general  anesthesia. Anesthesia monitored the head and neck area, IV access, and  vital signs throughout the course of the case. Status post induction,  the patient was placed in the dorsal lithotomy position. All underlying  points were pressure padded. SCDs were applied. Prepped and draped in  a normal sterile fashion. I proceeded by taking a 21-Yi Olympus  scope with a 30-degree lens and inserted through the meatus in an  atraumatic fashion. Panendoscopic visualization of the fossa  navicularis, pendulous urethra, bulbar urethra with visualized prostate. He certainly did not have that excessively large appearance. Upon  entrance into the bladder, it was very difficult to get the scope in  because of high-riding bladder neck. Bladder had reduced bladder  capacity. The right and left ureteral orifices had normal position. I  did shoot bilateral retrograde pyelograms under fluoroscopy. I did have  to use a 0.035 Glidewire bilaterally, so there was some air bubble  incorporation. The distal, mid, and proximal portions of both sets of  ureters were devoid of any significant masses, tumors, lesions, or  defects. There was appropriate cupping of the renal calyces. There was  no substantial hydronephrosis. After this occurred, I removed the  cystoscope and inserted a 27-Yi plasma loop continuous flow  resectoscope with normal saline and began to do a traditional  transurethral resection of the prostate. Total resection time was 14  minutes. Estimated removal about 5 to 10 gm.   Pathologic specimen was obtained. The external urethral sphincter, verumontanum, and ureteral  orifices were intact at all times. At the end of the case, I removed  the scope. He had an excellent stream while voiding. At this time, I  did place a 24 three-way Simplastic and started the patient on normal  saline continuous bladder irrigation with 30 mL in the balloon. Rectal  examination again demonstrates a firm anus which is from his previous  surgery. He awoke from anesthesia without complication and brought to  PACU in stable condition. PLAN:  1. He will go to the PACU. 2.  From there to the floor. 3.  We will continue the Estrada catheter. 4.  Continue the CBI until tomorrow morning. 5.  He will go home with the Estrada catheter. 6.  Catheter can be removed on Tuesday of next week. 7.  Pain medications and antibiotics were written. 8.  No intraoperative complications. 9.  Pathologic specimen sent out. 10.  The patient may have a neurogenic bladder with his history of  radiation to the pelvis with his history of cancer.         [address], DO    D: 02/12/2021 89:79:86       T: 02/12/2021 13:43:16     [id number]  Job#: [id number]     Doc#: [id number]    CC:

## 2021-02-13 NOTE — DISCHARGE SUMMARY
Physician Discharge Summary     Patient ID:  Geraldine Rahman  [id number]  89 y.o.  1945    Admit date: 2/12/2021    Discharge date and time: 2/13/2021    Admitting Physician: Francesca Ramirez DO     Admission Diagnoses: BPH with urinary obstruction [N40.1, N13.8]    Discharge Diagnoses: Hugh Heller Course:  UNEVENTFUL    Treatments: surgery: TURP    Disposition: home    Patient Instructions:   Current Discharge Medication List      START taking these medications    Details   oxyCODONE-acetaminophen (PERCOCET) 5-325 MG per tablet Take 1 tablet by mouth every 4 hours as needed for Pain for up to 7 days.   Qty: 10 tablet, Refills: 0    Comments: Reduce doses taken as pain becomes manageable  Associated Diagnoses: Post-op pain      ciprofloxacin (CIPRO) 500 MG tablet Take 1 tablet by mouth 2 times daily for 5 days  Qty: 10 tablet, Refills: 0         CONTINUE these medications which have NOT CHANGED    Details   magnesium hydroxide (MILK OF MAGNESIA) 400 MG/5ML suspension Take 5 mLs by mouth daily as needed for Constipation      metoprolol tartrate (LOPRESSOR) 25 MG tablet Take 0.5 tablets by mouth 2 times daily  Qty: 60 tablet, Refills: 3      aspirin 81 MG EC tablet Take 1 tablet by mouth daily  Qty: 30 tablet, Refills: 3      atorvastatin (LIPITOR) 40 MG tablet Take 1 tablet by mouth nightly  Qty: 30 tablet, Refills: 3      docusate sodium (COLACE) 100 MG capsule Take 1 capsule by mouth 2 times daily as needed for Constipation (constipation)  Qty: 20 capsule, Refills: 0      clopidogrel (PLAVIX) 75 MG tablet Take 1 tablet by mouth daily  Qty: 30 tablet, Refills: 3               Signed:  Cole Ramirez DO  2/13/2021  10:27 AM

## 2021-02-13 NOTE — PROGRESS NOTES
2/13/2021 9:27 AM  Service: Urology  Group: [facility] ([facility])    Laine Castromarvin  [id number]    Chief urologic compliant: BPH  HPI:  Patient is doing ok    Review of Systems:  Respiratory: negative for cough and hemoptysis  Cardiovascular: negative for chest pain and dyspnea  Gastrointestinal: negative for abdominal pain, diarrhea, nausea and vomiting  Derm: negative for rash and skin lesion(s)  Neurological: negative for seizures and tremors  Endocrine: negative for diabetic symptoms including polydipsia and polyuria  : As above in the HPI, otherwise negative  All other reviews are negative     Allergies: Patient has no known allergies.     Objective:   Vitals:    02/13/21 0735   BP:    Pulse: 74   Resp:    Temp:    SpO2:        Neuro: A/A/O x3  Respiratory: non labored breathing  ABD: soft non-tender, non-distended  : cheney clear  Ext: no clubbing, cyanosis, edema    Labs:   Recent Labs     02/12/21  1018 02/13/21  0420   WBC 10.3 11.3   RBC 4.03 3.15*   HGB 11.9* 9.4*   HCT 38.7 29.7*   MCV 96.0 94.3   MCH 29.5 29.8   MCHC 30.7* 31.6*   RDW 13.2 13.7    224   MPV 10.2 10.2       Recent Labs     02/12/21  1018   CREATININE 0.9       Assessment: Laine Samayoa 70 y.o. male     POD 1 TURP for BPH    Plan:    Cont cheney  Stop CBI   Cap 3rd port  Ambulate  IS  Await pathology  DC home later today with cheney  Cheney teaching  Cheney to be removed in the office on Monday of next week  Home going instructions reviewed  Rx's on chart  Patient instructed to call for any problems  His wife is present       Carmela Helton DO   [facility]

## 2021-04-22 RX ORDER — CLOPIDOGREL BISULFATE 75 MG/1
75 TABLET ORAL DAILY
Qty: 30 TABLET | Refills: 3 | Status: SHIPPED | OUTPATIENT
Start: 2021-04-22

## 2021-04-22 RX ORDER — ATORVASTATIN CALCIUM 40 MG/1
40 TABLET, FILM COATED ORAL NIGHTLY
Qty: 30 TABLET | Refills: 3 | Status: SHIPPED | OUTPATIENT
Start: 2021-04-22

## 2021-06-25 ENCOUNTER — APPOINTMENT (OUTPATIENT)
Dept: CT IMAGING | Age: 76
DRG: 663 | End: 2021-06-25
Payer: MEDICARE

## 2021-06-25 ENCOUNTER — HOSPITAL ENCOUNTER (INPATIENT)
Age: 76
LOS: 3 days | Discharge: HOME | DRG: 663 | End: 2021-06-29
Attending: EMERGENCY MEDICINE | Admitting: INTERNAL MEDICINE
Payer: MEDICARE

## 2021-06-25 DIAGNOSIS — R31.9 HEMATURIA, UNSPECIFIED TYPE: ICD-10-CM

## 2021-06-25 DIAGNOSIS — N17.9 AKI (ACUTE KIDNEY INJURY) (HCC): Primary | ICD-10-CM

## 2021-06-25 DIAGNOSIS — R10.30 LOWER ABDOMINAL PAIN: ICD-10-CM

## 2021-06-25 LAB
ALBUMIN SERPL-MCNC: 3.6 G/DL (ref 3.5–5.2)
ALP BLD-CCNC: 147 U/L (ref 40–129)
ALT SERPL-CCNC: 19 U/L (ref 0–40)
ANION GAP SERPL CALCULATED.3IONS-SCNC: 15 MMOL/L (ref 7–16)
AST SERPL-CCNC: 20 U/L (ref 0–39)
BASOPHILS ABSOLUTE: 0.06 E9/L (ref 0–0.2)
BASOPHILS RELATIVE PERCENT: 0.3 % (ref 0–2)
BILIRUB SERPL-MCNC: 0.4 MG/DL (ref 0–1.2)
BUN BLDV-MCNC: 23 MG/DL (ref 6–23)
CALCIUM SERPL-MCNC: 9.5 MG/DL (ref 8.6–10.2)
CHLORIDE BLD-SCNC: 101 MMOL/L (ref 98–107)
CO2: 19 MMOL/L (ref 22–29)
CREAT SERPL-MCNC: 1.6 MG/DL (ref 0.7–1.2)
EOSINOPHILS ABSOLUTE: 0 E9/L (ref 0.05–0.5)
EOSINOPHILS RELATIVE PERCENT: 0 % (ref 0–6)
GFR AFRICAN AMERICAN: 51
GFR NON-AFRICAN AMERICAN: 42 ML/MIN/1.73
GLUCOSE BLD-MCNC: 199 MG/DL (ref 74–99)
HCT VFR BLD CALC: 35.1 % (ref 37–54)
HEMOGLOBIN: 11.2 G/DL (ref 12.5–16.5)
IMMATURE GRANULOCYTES #: 0.12 E9/L
IMMATURE GRANULOCYTES %: 0.6 % (ref 0–5)
LYMPHOCYTES ABSOLUTE: 0.92 E9/L (ref 1.5–4)
LYMPHOCYTES RELATIVE PERCENT: 4.2 % (ref 20–42)
MAGNESIUM: 2.1 MG/DL (ref 1.6–2.6)
MCH RBC QN AUTO: 28.8 PG (ref 26–35)
MCHC RBC AUTO-ENTMCNC: 31.9 % (ref 32–34.5)
MCV RBC AUTO: 90.2 FL (ref 80–99.9)
MONOCYTES ABSOLUTE: 1.16 E9/L (ref 0.1–0.95)
MONOCYTES RELATIVE PERCENT: 5.3 % (ref 2–12)
NEUTROPHILS ABSOLUTE: 19.51 E9/L (ref 1.8–7.3)
NEUTROPHILS RELATIVE PERCENT: 89.6 % (ref 43–80)
PDW BLD-RTO: 14.5 FL (ref 11.5–15)
PLATELET # BLD: 404 E9/L (ref 130–450)
PMV BLD AUTO: 9.4 FL (ref 7–12)
POTASSIUM SERPL-SCNC: 4.4 MMOL/L (ref 3.5–5)
RBC # BLD: 3.89 E12/L (ref 3.8–5.8)
SODIUM BLD-SCNC: 135 MMOL/L (ref 132–146)
TOTAL PROTEIN: 8.1 G/DL (ref 6.4–8.3)
WBC # BLD: 21.8 E9/L (ref 4.5–11.5)

## 2021-06-25 PROCEDURE — 74176 CT ABD & PELVIS W/O CONTRAST: CPT

## 2021-06-25 PROCEDURE — 96361 HYDRATE IV INFUSION ADD-ON: CPT

## 2021-06-25 PROCEDURE — 2580000003 HC RX 258: Performed by: STUDENT IN AN ORGANIZED HEALTH CARE EDUCATION/TRAINING PROGRAM

## 2021-06-25 PROCEDURE — 6360000002 HC RX W HCPCS: Performed by: STUDENT IN AN ORGANIZED HEALTH CARE EDUCATION/TRAINING PROGRAM

## 2021-06-25 PROCEDURE — 74174 CTA ABD&PLVS W/CONTRAST: CPT

## 2021-06-25 PROCEDURE — 80053 COMPREHEN METABOLIC PANEL: CPT

## 2021-06-25 PROCEDURE — 96374 THER/PROPH/DIAG INJ IV PUSH: CPT

## 2021-06-25 PROCEDURE — 36415 COLL VENOUS BLD VENIPUNCTURE: CPT

## 2021-06-25 PROCEDURE — 85025 COMPLETE CBC W/AUTO DIFF WBC: CPT

## 2021-06-25 PROCEDURE — 83735 ASSAY OF MAGNESIUM: CPT

## 2021-06-25 PROCEDURE — 6360000004 HC RX CONTRAST MEDICATION: Performed by: RADIOLOGY

## 2021-06-25 RX ORDER — 0.9 % SODIUM CHLORIDE 0.9 %
1000 INTRAVENOUS SOLUTION INTRAVENOUS ONCE
Status: COMPLETED | OUTPATIENT
Start: 2021-06-25 | End: 2021-06-26

## 2021-06-25 RX ORDER — FENTANYL CITRATE 50 UG/ML
50 INJECTION, SOLUTION INTRAMUSCULAR; INTRAVENOUS ONCE
Status: COMPLETED | OUTPATIENT
Start: 2021-06-25 | End: 2021-06-25

## 2021-06-25 RX ADMIN — IOPAMIDOL 75 ML: 755 INJECTION, SOLUTION INTRAVENOUS at 23:55

## 2021-06-25 RX ADMIN — FENTANYL CITRATE 50 MCG: 50 INJECTION, SOLUTION INTRAMUSCULAR; INTRAVENOUS at 23:35

## 2021-06-25 RX ADMIN — SODIUM CHLORIDE 1000 ML: 9 INJECTION, SOLUTION INTRAVENOUS at 23:35

## 2021-06-25 ASSESSMENT — PAIN SCALES - GENERAL
PAINLEVEL_OUTOF10: 8
PAINLEVEL_OUTOF10: 8

## 2021-06-25 ASSESSMENT — ENCOUNTER SYMPTOMS
EYE REDNESS: 0
SORE THROAT: 0
SHORTNESS OF BREATH: 0
ABDOMINAL PAIN: 1
EYE PAIN: 0
BACK PAIN: 1
SINUS PRESSURE: 0
VOMITING: 0
WHEEZING: 0
NAUSEA: 0
COUGH: 0
DIARRHEA: 0
EYE DISCHARGE: 0

## 2021-06-25 NOTE — ED NOTES
FIRST PROVIDER CONTACT ASSESSMENT NOTE      Department of Emergency Medicine   Admit Date: No admission date for patient encounter. Chief Complaint: Hematuria, Abdominal Pain, and Back Pain      History of Present Illness:    Joni Le is a 70 y.o. male who presents to the ED for reports of hematuria, lower pelvic and abdominal pain and lower back pain which states began yesterday. States has been passing large amounts of clots in the urine. States he has not had a bowel movement in 3 days.   He did have a TURP done few years ago by Dr. Burrell Headings.        -----------------[address] [place] NOTE--------------  Electronically signed by ARGENTINA Boogie CNP   DD: 6/25/21               ARGENTINA Angel CNP  06/25/21 1922

## 2021-06-25 NOTE — LETTER
Beneficiary Notification Letter  BPCI Advanced     Your Doctor or Roberto,    We wanted to let you know that your health care provider, Neetu Lafleur, has volunteered to take part in our [facility] & Medicaid Services (CMS) Bundled Payments for [facility] (BPCI Advanced). This doesnt change your Medicare rights or benefits and you dont need to do anything. What are bundled payments? A bundled payment combines, or bundles together, payments that Medicare makes to your health care providers for the many different kinds of medical services you might get in a specific time period. In BPCI Advanced, this time period could include a hospital  inpatient stay or outpatient procedure, plus 90 days. Why would Medicare bundle payments? Bundled payments are thought of as a value-based way to pay because health care  providers are responsible for both the quality and cost of medical care they give. This is  a relatively new way of paying health care providers compared to thefee-for-service  way Medicare has traditionally paid, where providers are paid separately for each  service they provide. Bundled payments encourage these providers to work together to  provide better, more coordinated care during your hospital stay, or outpatient procedure,  and through your recovery. What does BPCI Advance mean for me? Youre more likely to get even better care when hospitals, doctors, and other health care  providers work together. In BPCI Advanced, hospitals, doctors, and other health care  providers may be rewarded for providing better, more coordinated health care. Medicare  will watch BPCI Advanced participants closely to make sure that you and other patients  keep getting efficient, high quality care. What do I need to know about BPCI Advanced?   Whats most important for you to know is that your Medicare rights and benefits wont  change because your health care provider is participating in [address] [place] [address]. Medicare  will keep covering all of your medically necessary services. Even though Medicare will pay your doctor in a different way under BPCI Advanced,  how much you have to pay wont change. Health care providers and suppliers who  are enrolled in Medicare will submit their Medicare claims like they always have. Youll have all the same Medicare rights and protections, including the right to  choose which hospital, doctor, or other health care provider you see. If you dont want to  get care from a health care provider whos participating in [address] [place] [address], then youll  have to choose a different health care provider whos not participating in the Model. How can I give feedback about my health care? Medicare might ask you to take a voluntary survey about the services and care you  received from [employer] during your hospital stay or outpatient procedure and for a specific period of time afterwards. You can decide whether you want to take the voluntary survey, but if you do, itll help Medicare make BPCI Advanced and the care of other Medicare patients better. If you have concerns or complaints about your care, you can:   · Talk to your doctor or health care provider. · Contact your Beneficiary and Family Centered Care Quality Improvement   Organization [facility]). You can get your BFCC-QIOs phone number at   Medicare.gov/contacts or by calling 1-800-MEDICARE. TTY users can call   1-955.194.3352. Where can I learn more about BPCI Advanced? Learn more about BPCI Advanced at https://innovation.cms.gov/initiatives/bpci-advanced/:  · A list of all the hospitals and physician group practices in the country participating   in [address] [place] [address].   · All of the inpatient and outpatient Clinical Episodes that are currently included   under BPCI Advanced. A Clinical Episode is a grouping of medical conditions or   diagnoses that are included in the [id number] [place] [address].

## 2021-06-26 PROBLEM — N17.9 AKI (ACUTE KIDNEY INJURY) (HCC): Status: ACTIVE | Noted: 2021-06-26

## 2021-06-26 LAB
BACTERIA: ABNORMAL /HPF
BILIRUBIN URINE: NEGATIVE
BLOOD, URINE: ABNORMAL
CLARITY: ABNORMAL
COLOR: ABNORMAL
EPITHELIAL CELLS, UA: ABNORMAL /HPF
GLUCOSE URINE: 100 MG/DL
KETONES, URINE: NEGATIVE MG/DL
LEUKOCYTE ESTERASE, URINE: NEGATIVE
NITRITE, URINE: NEGATIVE
PH UA: 7.5 (ref 5–9)
PROTEIN UA: >=300 MG/DL
RBC UA: ABNORMAL /HPF (ref 0–2)
SPECIFIC GRAVITY UA: 1.02 (ref 1–1.03)
UROBILINOGEN, URINE: 0.2 E.U./DL
WBC UA: ABNORMAL /HPF (ref 0–5)

## 2021-06-26 PROCEDURE — 81001 URINALYSIS AUTO W/SCOPE: CPT

## 2021-06-26 PROCEDURE — 99223 1ST HOSP IP/OBS HIGH 75: CPT | Performed by: INTERNAL MEDICINE

## 2021-06-26 PROCEDURE — 1200000000 HC SEMI PRIVATE

## 2021-06-26 PROCEDURE — 51702 INSERT TEMP BLADDER CATH: CPT

## 2021-06-26 PROCEDURE — 6360000002 HC RX W HCPCS: Performed by: STUDENT IN AN ORGANIZED HEALTH CARE EDUCATION/TRAINING PROGRAM

## 2021-06-26 PROCEDURE — 2580000003 HC RX 258: Performed by: STUDENT IN AN ORGANIZED HEALTH CARE EDUCATION/TRAINING PROGRAM

## 2021-06-26 PROCEDURE — 99284 EMERGENCY DEPT VISIT MOD MDM: CPT

## 2021-06-26 PROCEDURE — 6360000002 HC RX W HCPCS: Performed by: INTERNAL MEDICINE

## 2021-06-26 PROCEDURE — 6370000000 HC RX 637 (ALT 250 FOR IP): Performed by: INTERNAL MEDICINE

## 2021-06-26 PROCEDURE — 2580000003 HC RX 258: Performed by: INTERNAL MEDICINE

## 2021-06-26 PROCEDURE — 87088 URINE BACTERIA CULTURE: CPT

## 2021-06-26 RX ORDER — POLYETHYLENE GLYCOL 3350 17 G/17G
17 POWDER, FOR SOLUTION ORAL DAILY PRN
Status: DISCONTINUED | OUTPATIENT
Start: 2021-06-26 | End: 2021-06-29 | Stop reason: HOSPADM

## 2021-06-26 RX ORDER — SODIUM CHLORIDE 9 MG/ML
25 INJECTION, SOLUTION INTRAVENOUS PRN
Status: DISCONTINUED | OUTPATIENT
Start: 2021-06-26 | End: 2021-06-29 | Stop reason: HOSPADM

## 2021-06-26 RX ORDER — ACETAMINOPHEN 650 MG/1
650 SUPPOSITORY RECTAL EVERY 6 HOURS PRN
Status: DISCONTINUED | OUTPATIENT
Start: 2021-06-26 | End: 2021-06-29 | Stop reason: HOSPADM

## 2021-06-26 RX ORDER — SODIUM CHLORIDE 9 MG/ML
INJECTION, SOLUTION INTRAVENOUS CONTINUOUS
Status: DISCONTINUED | OUTPATIENT
Start: 2021-06-26 | End: 2021-06-29

## 2021-06-26 RX ORDER — ATORVASTATIN CALCIUM 20 MG/1
40 TABLET, FILM COATED ORAL NIGHTLY
Status: DISCONTINUED | OUTPATIENT
Start: 2021-06-26 | End: 2021-06-29 | Stop reason: HOSPADM

## 2021-06-26 RX ORDER — ACETAMINOPHEN 325 MG/1
650 TABLET ORAL EVERY 6 HOURS PRN
Status: DISCONTINUED | OUTPATIENT
Start: 2021-06-26 | End: 2021-06-29 | Stop reason: HOSPADM

## 2021-06-26 RX ORDER — MORPHINE SULFATE 4 MG/ML
4 INJECTION, SOLUTION INTRAMUSCULAR; INTRAVENOUS ONCE
Status: COMPLETED | OUTPATIENT
Start: 2021-06-26 | End: 2021-06-26

## 2021-06-26 RX ORDER — MORPHINE SULFATE 4 MG/ML
4 INJECTION, SOLUTION INTRAMUSCULAR; INTRAVENOUS EVERY 4 HOURS PRN
Status: DISCONTINUED | OUTPATIENT
Start: 2021-06-26 | End: 2021-06-29 | Stop reason: HOSPADM

## 2021-06-26 RX ORDER — SODIUM CHLORIDE 0.9 % (FLUSH) 0.9 %
5-40 SYRINGE (ML) INJECTION EVERY 12 HOURS SCHEDULED
Status: DISCONTINUED | OUTPATIENT
Start: 2021-06-26 | End: 2021-06-29 | Stop reason: HOSPADM

## 2021-06-26 RX ORDER — SODIUM CHLORIDE 0.9 % (FLUSH) 0.9 %
5-40 SYRINGE (ML) INJECTION PRN
Status: DISCONTINUED | OUTPATIENT
Start: 2021-06-26 | End: 2021-06-29 | Stop reason: HOSPADM

## 2021-06-26 RX ORDER — ONDANSETRON 4 MG/1
4 TABLET, ORALLY DISINTEGRATING ORAL EVERY 8 HOURS PRN
Status: DISCONTINUED | OUTPATIENT
Start: 2021-06-26 | End: 2021-06-29 | Stop reason: HOSPADM

## 2021-06-26 RX ORDER — ONDANSETRON 2 MG/ML
4 INJECTION INTRAMUSCULAR; INTRAVENOUS EVERY 6 HOURS PRN
Status: DISCONTINUED | OUTPATIENT
Start: 2021-06-26 | End: 2021-06-29 | Stop reason: HOSPADM

## 2021-06-26 RX ADMIN — MORPHINE SULFATE 4 MG: 4 INJECTION, SOLUTION INTRAMUSCULAR; INTRAVENOUS at 03:25

## 2021-06-26 RX ADMIN — ATORVASTATIN CALCIUM 40 MG: 20 TABLET, FILM COATED ORAL at 23:30

## 2021-06-26 RX ADMIN — SODIUM CHLORIDE: 9 INJECTION, SOLUTION INTRAVENOUS at 09:43

## 2021-06-26 RX ADMIN — METOPROLOL TARTRATE 12.5 MG: 25 TABLET, FILM COATED ORAL at 11:33

## 2021-06-26 RX ADMIN — MORPHINE SULFATE 4 MG: 4 INJECTION, SOLUTION INTRAMUSCULAR; INTRAVENOUS at 09:52

## 2021-06-26 RX ADMIN — CEFTRIAXONE SODIUM 1000 MG: 1 INJECTION, POWDER, FOR SOLUTION INTRAMUSCULAR; INTRAVENOUS at 03:49

## 2021-06-26 RX ADMIN — METOPROLOL TARTRATE 12.5 MG: 25 TABLET, FILM COATED ORAL at 23:30

## 2021-06-26 ASSESSMENT — PAIN SCALES - GENERAL
PAINLEVEL_OUTOF10: 7
PAINLEVEL_OUTOF10: 0
PAINLEVEL_OUTOF10: 7

## 2021-06-26 NOTE — ED NOTES
Name: Lizbet Sandoval  :   MRN: [id number]    Date: 2021    Benefits of immediately proceeding with Radiology exam outweigh the risks and therefore the following is being waived:      [] Pregnancy test    [] Protocol for Iodine allergy    [] MRI questionnaire    [x] BUN/Creatinine        DO Maryjane Wesley Aas, Aas, DO  21 [id number]

## 2021-06-26 NOTE — H&P
[facility] History and Physical      CHIEF COMPLAINT: Hematuria    History of Present Illness: Patient is a 79-year-old male with past medical history significant for rectal cancer, radiation, NSTEMI, hypertension, coronary artery disease, hyperlipidemia, CABG, TURP. He presented to the emergency department with complaints of 3 days of abdominal pain, back pain, hematuria. He states that his pain is located in the lower abdomen and suprapubic region. There is radiation to his back. He is also complaining of gross hematuria. His TURP procedure was performed in February of this year. In the ED, hemoglobin is 11.2 initially. After multiple bags of fluid through CBI, urine is still red. Work-up also shows bilateral hydronephrosis, elevated creatinine of 1.6, up from baseline of 0.9. Medicine was asked to admit for urology consultation and further monitoring. REVIEW OF SYSTEMS:  A comprehensive review of systems was negative except for: what is in the HPI    PMH:  Past Medical History:   Diagnosis Date    CAD (coronary artery disease)     HTN (hypertension)     Hyperlipidemia     [facility]non-ST elevated [facility]     [facility]al cancer [facility]     Tobacco abuse, in remission        Surgical History:  Past Surgical History:   Procedure Laterality Date    COLONOSCOPY      CORONARY ARTERY BYPASS GRAFT N/A 12/10/2020    CORONARY ARTERY BYPASS, BELINDA performed by Johanne Montes DO at [address] [place] [address] Left     RECTAL SURGERY      TURP N/A 2/12/2021    CYSTOSCOPY RETROGRADE PYELOGRAM PLASMA LOOP TRANSURETHRAL RESECTION PROSTATE performed by Reagan Ramirez DO at [facility] OR       Medications Prior to Admission:    Prior to Admission medications    Medication Sig Start Date End Date Taking?  Authorizing Provider   atorvastatin (LIPITOR) 40 MG tablet Take 1 tablet by mouth nightly 4/22/21   Marisela Rivers MD   clopidogrel (PLAVIX) 75 MG tablet Take 1 tablet by mouth daily 4/22/21   Gustavo Gutierrez MD   magnesium hydroxide (MILK OF MAGNESIA) 400 MG/5ML suspension Take 5 mLs by mouth daily as needed for Constipation    Historical Provider, MD   metoprolol tartrate (LOPRESSOR) 25 MG tablet Take 0.5 tablets by mouth 2 times daily 1/22/21   Gustavo Gutierrez MD   aspirin 81 MG EC tablet Take 1 tablet by mouth daily 12/14/20   EDUARDO Cortés   docusate sodium (COLACE) 100 MG capsule Take 1 capsule by mouth 2 times daily as needed for Constipation (constipation) 12/14/20   EDUARDO Cortés       Allergies:    Patient has no known allergies. Social History:    reports that he quit smoking about 21 years ago. His smoking use included cigarettes. He has a 33.00 pack-year smoking history. He has never used smokeless tobacco. He reports previous alcohol use. He reports that he does not use drugs. Family History:   family history is not on file.   Reviewed, not pertinent to admission      PHYSICAL EXAM:  Vitals:  /86   Pulse 96   Temp 97 °F (36.1 °C) (Temporal)   Resp 17   SpO2 98%     General Appearance: alert and oriented to person, place and time and in no acute distress  Skin: warm and dry  Head: normocephalic and atraumatic  Eyes: pupils equal, round, and reactive to light, extraocular eye movements intact, conjunctivae normal  Neck: neck supple and non tender without mass   Pulmonary/Chest: clear to auscultation bilaterally- no wheezes, rales or rhonchi, normal air movement, no respiratory distress  Cardiovascular: normal rate, normal S1 and S2 and no carotid bruits  Abdomen: soft, non-tender, non-distended, normal bowel sounds, no masses or organomegaly, Estrada bag in place with red urine  Extremities: no cyanosis, no clubbing and no edema  Neurologic: no cranial nerve deficit and speech normal        LABS:  Recent Labs     06/25/21  1925      K 4.4      CO2 19*   BUN 23   CREATININE 1.6*   GLUCOSE 199*   CALCIUM 9.5 Recent Labs     06/25/21 1925   WBC 21.8*   RBC 3.89   HGB 11.2*   HCT 35.1*   MCV 90.2   MCH 28.8   MCHC 31.9*   RDW 14.5      MPV 9.4       No results for input(s): POCGLU in the last 72 hours. CBC:   Lab Results   Component Value Date    WBC 21.8 06/25/2021    RBC 3.89 06/25/2021    HGB 11.2 06/25/2021    HCT 35.1 06/25/2021    MCV 90.2 06/25/2021    MCH 28.8 06/25/2021    MCHC 31.9 06/25/2021    RDW 14.5 06/25/2021     06/25/2021    MPV 9.4 06/25/2021     CMP:    Lab Results   Component Value Date     06/25/2021    K 4.4 06/25/2021    K 3.9 12/07/2020     06/25/2021    CO2 19 06/25/2021    BUN 23 06/25/2021    CREATININE 1.6 06/25/2021    GFRAA 51 06/25/2021    LABGLOM 42 06/25/2021    GLUCOSE 199 06/25/2021    PROT 8.1 06/25/2021    LABALBU 3.6 06/25/2021    CALCIUM 9.5 06/25/2021    BILITOT 0.4 06/25/2021    ALKPHOS 147 06/25/2021    AST 20 06/25/2021    ALT 19 06/25/2021       Radiology:   CTA ABDOMEN PELVIS W CONTRAST   Final Result   No evidence of aortic dissection or aneurysmal dilatation. Bilateral hydronephrosis and bilateral hydroureter. Perinephric stranding which may be chronic, or related to obstructive   uropathy. Renal inflammatory process cannot be excluded. No striated   nephrogram to suggest pyelonephritis. Heterogeneous increased attenuation within the urinary bladder consistent   with hematuria or clot. Nonspecific stranding of the subcutaneous fat at the base of the penis which   may represent edema or cellulitis. Bilateral hydrocele, left greater than right. CT ABDOMEN PELVIS WO CONTRAST Additional Contrast? None   Final Result   Dilated urinary bladder containing a moderate to large amount of blood   products within the lumen of the bladder. There is also severe bilateral   hydroureteronephrosis. While this could be simple hematuria, an  underlying   mass cannot be excluded.   Additionally, there is subcutaneous fat stranding   along the base of the penis, of uncertain etiology or clinical significance. Urology consultation is recommended. Relatively ahaustral appearance of the descending and rectosigmoid colon. Please correlate for prior history of ulcerative colitis. Large gastric diverticulum versus a large amount of undigested material and   gas within the gastric cardia. This would be better evaluated with upper   endoscopy, if clinically appropriate. Incidental findings as above. ASSESSMENT:      Active Problems:    MAYRA (acute kidney injury) ([address]  Resolved Problems:    * No resolved hospital problems. *      PLAN:    Gross hematuria  Bilateral hydronephrosis  Acute kidney injury  Patient presenting with suprapubic discomfort, hematuria. He was started on CBI in the ED. After 3 bags of fluid, there is still gross hematuria present. Initial hemoglobin is 11.2. Creatinine is 1.6. February of this year his creatinine was 0.9. CT of the abdomen shows bilateral hydronephrosis. Concern is that blood clots may be obstructing ureteral flow. Medicine was asked to admit for urology consultation and further monitoring.  -Admit to medical unit  -Continue CBI  -Monitor hemoglobin  -IV fluids  -Monitor renal function  -Urology consult    Coronary artery disease  -Hold home dose of aspirin and Plavix due to bleeding  -Continue home dose of statin    Hypertension  -Continue home dose of metoprolol    Code Status: Full  DVT prophylaxis: SCDs      NOTE: This report was transcribed using voice recognition software. Every effort was made to ensure accuracy; however, inadvertent computerized transcription errors may be present.   Electronically signed by Cholo Walsh DO on 6/26/2021 at 3:07 AM

## 2021-06-26 NOTE — PROGRESS NOTES
Admitting Date and Time: 6/25/2021  9:23 PM  Admit Dx: MAYRA (acute kidney injury) ([facility] [address] [N17.9]    Subjective:    Pt feels lower ab pain radiating to back  Per RN: no other issues    ROS: denies fever, chills, cp, sob, n/v, HA unless stated above.  [START ON 6/27/2021] cefTRIAXone (ROCEPHIN) IV  1,000 mg Intravenous Q24H     morphine, 4 mg, Q4H PRN         Objective:    /76   Pulse 96   Temp 97 °F (36.1 °C) (Temporal)   Resp 17   SpO2 98%   General Appearance: alert and oriented to person, place and time and in no acute distress  Skin: warm and dry  Head: normocephalic and atraumatic  Eyes: pupils equal, round, and reactive to light, extraocular eye movements intact, conjunctivae normal  Neck: neck supple and non tender without mass   Pulmonary/Chest: clear to auscultation bilaterally- no wheezes, rales or rhonchi, normal air movement, no respiratory distress  Cardiovascular: normal rate, normal S1 and S2 and no carotid bruits  Abdomen: soft, non-tender, non-distended, normal bowel sounds, no masses or organomegaly  Extremities: no cyanosis, no clubbing and no  edema  Neurologic: no cranial nerve deficit and speech normal      Recent Labs     06/25/21 1925      K 4.4      CO2 19*   BUN 23   CREATININE 1.6*   GLUCOSE 199*   CALCIUM 9.5       Recent Labs     06/25/21 1925   ALKPHOS 147*   PROT 8.1   LABALBU 3.6   BILITOT 0.4   AST 20   ALT 19       Recent Labs     06/25/21 1925   WBC 21.8*   RBC 3.89   HGB 11.2*   HCT 35.1*   MCV 90.2   MCH 28.8   MCHC 31.9*   RDW 14.5      MPV 9.4           Radiology:   CTA ABDOMEN PELVIS W CONTRAST   Final Result   No evidence of aortic dissection or aneurysmal dilatation. Bilateral hydronephrosis and bilateral hydroureter. Perinephric stranding which may be chronic, or related to obstructive   uropathy. Renal inflammatory process cannot be excluded. No striated   nephrogram to suggest pyelonephritis.       Heterogeneous increased attenuation within the urinary bladder consistent   with hematuria or clot. Nonspecific stranding of the subcutaneous fat at the base of the penis which   may represent edema or cellulitis. Bilateral hydrocele, left greater than right. CT ABDOMEN PELVIS WO CONTRAST Additional Contrast? None   Final Result   Dilated urinary bladder containing a moderate to large amount of blood   products within the lumen of the bladder. There is also severe bilateral   hydroureteronephrosis. While this could be simple hematuria, an  underlying   mass cannot be excluded. Additionally, there is subcutaneous fat stranding   along the base of the penis, of uncertain etiology or clinical significance. Urology consultation is recommended. Relatively ahaustral appearance of the descending and rectosigmoid colon. Please correlate for prior history of ulcerative colitis. Large gastric diverticulum versus a large amount of undigested material and   gas within the gastric cardia. This would be better evaluated with upper   endoscopy, if clinically appropriate. Incidental findings as above. Assessment:  Active Problems:    MAYRA (acute kidney injury) ([facility] [address]  Resolved Problems:    * No resolved hospital problems. *      Plan:  History of present illness from History and Physical:   Patient is a 71-year-old male with past medical history significant for rectal cancer, radiation, NSTEMI, hypertension, coronary artery disease, hyperlipidemia, CABG, TURP. He presented to the emergency department with complaints of 3 days of abdominal pain, back pain, hematuria. He states that his pain is located in the lower abdomen and suprapubic region. There is radiation to his back. He is also complaining of gross hematuria. His TURP procedure was performed in February of this year. In the ED, hemoglobin is 11.2 initially. After multiple bags of fluid through CBI, urine is still red.   Work-up also shows bilateral hydronephrosis, elevated creatinine of 1.6, up from baseline of 0.9. Medicine was asked to admit for urology consultation and further monitoring. 1) MAYRA / Gross hematuria / Bilateral hydronephrosis  R/o obstruction from blood clots per urology. started on CBI in the ED.   creatinine February 0.9.      2) acute blood loss anemia Monitor hemoglobin. No need for transfusion now    3) Coronary artery disease  Hold home dose of aspirin and Plavix due to bleeding  Continue home dose of statin    4) Hypertension: Continue home dose of metoprolol   5)  Change code status to dnrcca. I d/w patient and rn had witnessed discussion between patient and wife. patient agrees wife is aware  6) DVT prophylaxis: SCDs  7)   dispo home when ready      NOTE: This report was transcribed using voice recognition software. Every effort was made to ensure accuracy; however, inadvertent computerized transcription errors may be present.      Electronically signed by Padma Guerrero MD on 6/26/2021 at 10:20 AM

## 2021-06-26 NOTE — ED NOTES
Bladder irrigation clamped off at this time, pts urine running clear      [place], [place]  82/[id number]/67 [id number]

## 2021-06-26 NOTE — ED PROVIDER NOTES
Chief Complaint   Patient presents with    Hematuria    Abdominal Pain    Back Pain       Patient is a 74-year-old male presents today for abdominal pain, back pain, hematuria. States symptom started 3 days ago and have been constant since then. Symptoms not made better or worse by anything specific. He has not tried any medication for the symptoms. Describes as a sharp pain in the epigastric and suprapubic region which shoots into his back. He has not had pain like this before in the past.  States he did have a TURP procedure but now is back in February. Denies lightheadedness or dizziness. Denies nausea vomiting. Denies diarrhea constipation. The history is provided by the patient. No  was used. Review of Systems   Constitutional: Negative for chills, diaphoresis and fever. HENT: Negative for ear pain, sinus pressure and sore throat. Eyes: Negative for pain, discharge and redness. Respiratory: Negative for cough, shortness of breath and wheezing. Cardiovascular: Negative for chest pain. Gastrointestinal: Positive for abdominal pain. Negative for diarrhea, nausea and vomiting. Genitourinary: Positive for hematuria. Negative for difficulty urinating, dysuria, flank pain and frequency. Musculoskeletal: Positive for back pain. Negative for arthralgias. Skin: Negative for rash and wound. Neurological: Negative for dizziness, syncope, weakness, numbness and headaches. Hematological: Negative for adenopathy. Psychiatric/Behavioral: Negative for behavioral problems and confusion. All other systems reviewed and are negative. Physical Exam  Vitals and nursing note reviewed. Constitutional:       General: He is not in acute distress. Appearance: He is well-developed. He is not ill-appearing. HENT:      Head: Normocephalic and atraumatic. Eyes:      Pupils: Pupils are equal, round, and reactive to light.    Cardiovascular:      Rate and Rhythm: Normal rate and regular rhythm. Heart sounds: Normal heart sounds. No murmur heard. Pulmonary:      Effort: Pulmonary effort is normal. No respiratory distress. Breath sounds: Normal breath sounds. No wheezing or rales. Abdominal:      General: Bowel sounds are normal.      Palpations: Abdomen is soft. Tenderness: There is abdominal tenderness (generalized). There is no guarding or rebound. Musculoskeletal:      Cervical back: Normal range of motion and neck supple. Right lower leg: No edema. Left lower leg: No edema. Skin:     General: Skin is warm and dry. Capillary Refill: Capillary refill takes less than 2 seconds. Neurological:      General: No focal deficit present. Mental Status: He is alert and oriented to person, place, and time. Cranial Nerves: No cranial nerve deficit.       Coordination: Coordination normal.   Psychiatric:         Mood and Affect: Mood normal.          Procedures     Labs Reviewed   CBC WITH AUTO DIFFERENTIAL - Abnormal; Notable for the following components:       Result Value    WBC 21.8 (*)     Hemoglobin 11.2 (*)     Hematocrit 35.1 (*)     MCHC 31.9 (*)     Neutrophils % 89.6 (*)     Lymphocytes % 4.2 (*)     Neutrophils Absolute 19.51 (*)     Lymphocytes Absolute 0.92 (*)     Monocytes Absolute 1.16 (*)     Eosinophils Absolute 0.00 (*)     All other components within normal limits   COMPREHENSIVE METABOLIC PANEL - Abnormal; Notable for the following components:    CO2 19 (*)     Glucose 199 (*)     CREATININE 1.6 (*)     Alkaline Phosphatase 147 (*)     All other components within normal limits   URINALYSIS - Abnormal; Notable for the following components:    Color, UA RED (*)     Clarity, UA TURBID (*)     Glucose, Ur 100 (*)     Blood, Urine LARGE (*)     Protein, UA >=300 (*)     All other components within normal limits   MICROSCOPIC URINALYSIS - Abnormal; Notable for the following components:    Bacteria, UA FEW (*)     All other components within normal limits   CULTURE, URINE   MAGNESIUM   BASIC METABOLIC PANEL W/ REFLEX TO MG FOR LOW K   CBC   BASIC METABOLIC PANEL     CTA ABDOMEN PELVIS W CONTRAST   Final Result   No evidence of aortic dissection or aneurysmal dilatation. Bilateral hydronephrosis and bilateral hydroureter. Perinephric stranding which may be chronic, or related to obstructive   uropathy. Renal inflammatory process cannot be excluded. No striated   nephrogram to suggest pyelonephritis. Heterogeneous increased attenuation within the urinary bladder consistent   with hematuria or clot. Nonspecific stranding of the subcutaneous fat at the base of the penis which   may represent edema or cellulitis. Bilateral hydrocele, left greater than right. CT ABDOMEN PELVIS WO CONTRAST Additional Contrast? None   Final Result   Dilated urinary bladder containing a moderate to large amount of blood   products within the lumen of the bladder. There is also severe bilateral   hydroureteronephrosis. While this could be simple hematuria, an  underlying   mass cannot be excluded. Additionally, there is subcutaneous fat stranding   along the base of the penis, of uncertain etiology or clinical significance. Urology consultation is recommended. Relatively ahaustral appearance of the descending and rectosigmoid colon. Please correlate for prior history of ulcerative colitis. Large gastric diverticulum versus a large amount of undigested material and   gas within the gastric cardia. This would be better evaluated with upper   endoscopy, if clinically appropriate. Incidental findings as above. MDM  Number of Diagnoses or Management Options  MAYRA (acute kidney injury) ([address]  Lower abdominal pain  Diagnosis management comments: Patient is a 68-year-old male presents today with abdominal pain and hematuria. Physical exam shows tenderness palpation of the lower abdomen.   UA shows gross hematuria, patient does is passing large blood clots, therefore Estrada catheter was placed with bladder irrigation initiated. Initial CT from triage without contrast was ordered to evaluate for possible kidney stones, they do note blood products within the bladder, however there was concern for possible AAA as patient was having pain in his abdomen that radiates to his back with hematuria, therefore patient was sent back to CT imaging after he was evaluated by myself. No evidence of AAA. CT shows bilateral hydronephrosis and bilateral hydroureter, perinephric stranding also noted. Heterogeneous increase attenuation in the urinary bladder consistent with hematuria and clots. Patient's vitals have remained stable. Lab work pertinent for MAYRA with creatinine of 1.6 elevated from baseline. White count of 22,000. There is no evidence of UTI. Patient will be admitted to hospital for gross hematuria, bilateral hydronephrosis with associated MAYRA. He was given an antibiotic department. Patient remained stable while in treatment. Hospitalist was consulted who agrees to admit. Patient agrees with plan. Amount and/or Complexity of Data Reviewed  Clinical lab tests: reviewed  Tests in the radiology section of CPT®: reviewed             ED Course as of Jun 26 2030   Sat Jun 26, 2021   0057 Patient resting comfortably in bed, bladder irrigation initiated, still has gross hematuria    [JH]      ED Course User Index  [JH] Flor Ivan, DO       --------------------------------------------- PAST HISTORY ---------------------------------------------  Past Medical History:  has a past medical history of CAD (coronary artery disease), HTN (hypertension), Hyperlipidemia, NSTEMI (non-ST elevated myocardial infarction) ([facility] [address] Rectal cancer ([facility] [address] and Tobacco abuse, in remission. Past Surgical History:  has a past surgical history that includes Coronary artery bypass graft (N/A, 12/10/2020);  Rectal surgery; Hemorrhoid surgery; Inguinal hernia repair (Left); Colonoscopy; and TURP (N/A, 2/12/2021). Social History:  reports that he quit smoking about 21 years ago. His smoking use included cigarettes. He has a 33.00 pack-year smoking history. He has never used smokeless tobacco. He reports current alcohol use of about 2.0 standard drinks of alcohol per week. He reports that he does not use drugs. Family History: family history is not on file. The patients home medications have been reviewed.     Allergies: Cipro [ciprofloxacin hcl]    -------------------------------------------------- RESULTS -------------------------------------------------    LABS:  Results for orders placed or performed during the hospital encounter of 06/25/21   CBC Auto Differential   Result Value Ref Range    WBC 21.8 (H) 4.5 - 11.5 E9/L    RBC 3.89 3.80 - 5.80 E12/L    Hemoglobin 11.2 (L) 12.5 - 16.5 g/dL    Hematocrit 35.1 (L) 37.0 - 54.0 %    MCV 90.2 80.0 - 99.9 fL    MCH 28.8 26.0 - 35.0 pg    MCHC 31.9 (L) 32.0 - 34.5 %    RDW 14.5 11.5 - 15.0 fL    Platelets 055 966 - 538 E9/L    MPV 9.4 7.0 - 12.0 fL    Neutrophils % 89.6 (H) 43.0 - 80.0 %    Immature Granulocytes % 0.6 0.0 - 5.0 %    Lymphocytes % 4.2 (L) 20.0 - 42.0 %    Monocytes % 5.3 2.0 - 12.0 %    Eosinophils % 0.0 0.0 - 6.0 %    Basophils % 0.3 0.0 - 2.0 %    Neutrophils Absolute 19.51 (H) 1.80 - 7.30 E9/L    Immature Granulocytes # 0.12 E9/L    Lymphocytes Absolute 0.92 (L) 1.50 - 4.00 E9/L    Monocytes Absolute 1.16 (H) 0.10 - 0.95 E9/L    Eosinophils Absolute 0.00 (L) 0.05 - 0.50 E9/L    Basophils Absolute 0.06 0.00 - 0.20 E9/L   Comprehensive Metabolic Panel   Result Value Ref Range    Sodium 135 132 - 146 mmol/L    Potassium 4.4 3.5 - 5.0 mmol/L    Chloride 101 98 - 107 mmol/L    CO2 19 (L) 22 - 29 mmol/L    Anion Gap 15 7 - 16 mmol/L    Glucose 199 (H) 74 - 99 mg/dL    BUN 23 6 - 23 mg/dL    CREATININE 1.6 (H) 0.7 - 1.2 mg/dL    GFR Non-African American 42 >=60 mL/min/1.73    GFR African American 51     Calcium 9.5 8.6 - 10.2 mg/dL    Total Protein 8.1 6.4 - 8.3 g/dL    Albumin 3.6 3.5 - 5.2 g/dL    Total Bilirubin 0.4 0.0 - 1.2 mg/dL    Alkaline Phosphatase 147 (H) 40 - 129 U/L    ALT 19 0 - 40 U/L    AST 20 0 - 39 U/L   Urinalysis   Result Value Ref Range    Color, UA RED (A) Straw/Yellow    Clarity, UA TURBID (A) Clear    Glucose, Ur 100 (A) Negative mg/dL    Bilirubin Urine Negative Negative    Ketones, Urine Negative Negative mg/dL    Specific Gravity, UA 1.025 1.005 - 1.030    Blood, Urine LARGE (A) Negative    pH, UA 7.5 5.0 - 9.0    Protein, UA >=300 (A) Negative mg/dL    Urobilinogen, Urine 0.2 <2.0 E.U./dL    Nitrite, Urine Negative Negative    Leukocyte Esterase, Urine Negative Negative   Magnesium   Result Value Ref Range    Magnesium 2.1 1.6 - 2.6 mg/dL   Microscopic Urinalysis   Result Value Ref Range    WBC, UA 1-3 0 - 5 /HPF    RBC, UA PACKED 0 - 2 /HPF    Epithelial Cells, UA RARE /HPF    Bacteria, UA FEW (A) None Seen /HPF       RADIOLOGY:  CTA ABDOMEN PELVIS W CONTRAST   Final Result   No evidence of aortic dissection or aneurysmal dilatation. Bilateral hydronephrosis and bilateral hydroureter. Perinephric stranding which may be chronic, or related to obstructive   uropathy. Renal inflammatory process cannot be excluded. No striated   nephrogram to suggest pyelonephritis. Heterogeneous increased attenuation within the urinary bladder consistent   with hematuria or clot. Nonspecific stranding of the subcutaneous fat at the base of the penis which   may represent edema or cellulitis. Bilateral hydrocele, left greater than right. CT ABDOMEN PELVIS WO CONTRAST Additional Contrast? None   Final Result   Dilated urinary bladder containing a moderate to large amount of blood   products within the lumen of the bladder. There is also severe bilateral   hydroureteronephrosis.   While this could be simple hematuria, an  underlying mass cannot be excluded. Additionally, there is subcutaneous fat stranding   along the base of the penis, of uncertain etiology or clinical significance. Urology consultation is recommended. Relatively ahaustral appearance of the descending and rectosigmoid colon. Please correlate for prior history of ulcerative colitis. Large gastric diverticulum versus a large amount of undigested material and   gas within the gastric cardia. This would be better evaluated with upper   endoscopy, if clinically appropriate. Incidental findings as above.                 ------------------------- NURSING NOTES AND VITALS REVIEWED ---------------------------  Date / Time Roomed:  6/25/2021  9:23 PM  ED Bed Assignment:  13/13    The nursing notes within the ED encounter and vital signs as below have been reviewed. Patient Vitals for the past 24 hrs:   BP Temp Pulse Resp SpO2   06/26/21 1530 110/87 -- 98 18 98 %   06/26/21 1133 131/85 98.8 °F (37.1 °C) 122 18 98 %   06/26/21 0715 134/76 -- -- -- --       Oxygen Saturation Interpretation: Normal    ------------------------------------------ PROGRESS NOTES ------------------------------------------    Counseling:  I have spoken with the patient and discussed todays results, in addition to providing specific details for the plan of care and counseling regarding the diagnosis and prognosis. Their questions are answered at this time and they are agreeable with the plan of admission.    --------------------------------- ADDITIONAL PROVIDER NOTES ---------------------------------  Consultations:  Spoke with Dr. Damion Quinn. Discussed case. They will admit the patient. This patient's ED course included: a personal history and physicial examination    This patient has remained hemodynamically stable during their ED course.       Medications   atorvastatin (LIPITOR) tablet 40 mg (has no administration in time range)   metoprolol tartrate (LOPRESSOR) tablet 12.5 mg (12.5 mg Oral Given 6/26/21 1133)   sodium chloride flush 0.9 % injection 5-40 mL (has no administration in time range)   sodium chloride flush 0.9 % injection 5-40 mL (has no administration in time range)   0.9 % sodium chloride infusion (has no administration in time range)   ondansetron (ZOFRAN-ODT) disintegrating tablet 4 mg (has no administration in time range)     Or   ondansetron (ZOFRAN) injection 4 mg (has no administration in time range)   polyethylene glycol (GLYCOLAX) packet 17 g (has no administration in time range)   acetaminophen (TYLENOL) tablet 650 mg (has no administration in time range)     Or   acetaminophen (TYLENOL) suppository 650 mg (has no administration in time range)   0.9 % sodium chloride infusion ( Intravenous New Bag 6/26/21 0943)   cefTRIAXone (ROCEPHIN) 1,000 mg in sodium chloride (PF) 10 mL IV syringe (has no administration in time range)   morphine injection 4 mg (4 mg Intravenous Given 6/26/21 0952)   0.9 % sodium chloride bolus (0 mLs Intravenous Stopped 6/26/21 0346)   fentaNYL (SUBLIMAZE) injection 50 mcg (50 mcg Intravenous Given 6/25/21 2335)   iopamidol (ISOVUE-370) 76 % injection 75 mL (75 mLs Intravenous Given 6/25/21 2355)   morphine injection 4 mg (4 mg Intravenous Given 6/26/21 0325)   cefTRIAXone (ROCEPHIN) 1,000 mg in sterile water 10 mL IV syringe (0 mg Intravenous Stopped 6/26/21 1448)         Diagnosis:  1. MAYRA (acute kidney injury) ([facility] [address]    2. Lower abdominal pain        Disposition:  Patient's disposition: Admit to med/surg floor  Patient's condition is stable.              Jian Hand DO  Resident  06/26/21 3357

## 2021-06-26 NOTE — PROGRESS NOTES
Radiology Procedure Waiver   Name: Evangelina Carr  : [id number]  MRN: [id number]    Date:  21    Time: 9:39 PM EDT    Benefits of immediately proceeding with Radiology exam(s) without pre-testing outweigh the risks or are not indicated as specified below and therefore the following is/are being waived:    [] Pregnancy test   [] Patients LMP on-time and regular.   [] Patient had Tubal Ligation or has other Contraception Device. [] Patient  is Menopausal or Premenarcheal.    [] Patient had Full or Partial Hysterectomy. [] Protocol for Iodine allergy    [] MRI Questionnaire     [x] BUN/Creatinine   [] Patient age w/no hx of renal dysfunction. [] Patient on Dialysis. [] Recent Normal Labs.   Electronically signed by Sofiya Magana DO on 21 at 9:39 PM EDT

## 2021-06-27 LAB
ANION GAP SERPL CALCULATED.3IONS-SCNC: 6 MMOL/L (ref 7–16)
BUN BLDV-MCNC: 33 MG/DL (ref 6–23)
CALCIUM SERPL-MCNC: 8.8 MG/DL (ref 8.6–10.2)
CHLORIDE BLD-SCNC: 108 MMOL/L (ref 98–107)
CO2: 24 MMOL/L (ref 22–29)
CREAT SERPL-MCNC: 1.7 MG/DL (ref 0.7–1.2)
GFR AFRICAN AMERICAN: 48
GFR NON-AFRICAN AMERICAN: 40 ML/MIN/1.73
GLUCOSE BLD-MCNC: 105 MG/DL (ref 74–99)
HCT VFR BLD CALC: 24 % (ref 37–54)
HEMOGLOBIN: 7.7 G/DL (ref 12.5–16.5)
MCH RBC QN AUTO: 28.8 PG (ref 26–35)
MCHC RBC AUTO-ENTMCNC: 32.1 % (ref 32–34.5)
MCV RBC AUTO: 89.9 FL (ref 80–99.9)
PDW BLD-RTO: 15.4 FL (ref 11.5–15)
PLATELET # BLD: 281 E9/L (ref 130–450)
PMV BLD AUTO: 9.7 FL (ref 7–12)
POTASSIUM REFLEX MAGNESIUM: 4.4 MMOL/L (ref 3.5–5)
RBC # BLD: 2.67 E12/L (ref 3.8–5.8)
SODIUM BLD-SCNC: 138 MMOL/L (ref 132–146)
WBC # BLD: 14.9 E9/L (ref 4.5–11.5)

## 2021-06-27 PROCEDURE — 36415 COLL VENOUS BLD VENIPUNCTURE: CPT

## 2021-06-27 PROCEDURE — 6370000000 HC RX 637 (ALT 250 FOR IP): Performed by: INTERNAL MEDICINE

## 2021-06-27 PROCEDURE — 2580000003 HC RX 258: Performed by: INTERNAL MEDICINE

## 2021-06-27 PROCEDURE — 1200000000 HC SEMI PRIVATE

## 2021-06-27 PROCEDURE — 80048 BASIC METABOLIC PNL TOTAL CA: CPT

## 2021-06-27 PROCEDURE — 6360000002 HC RX W HCPCS: Performed by: INTERNAL MEDICINE

## 2021-06-27 PROCEDURE — 99232 SBSQ HOSP IP/OBS MODERATE 35: CPT | Performed by: INTERNAL MEDICINE

## 2021-06-27 PROCEDURE — 6370000000 HC RX 637 (ALT 250 FOR IP): Performed by: STUDENT IN AN ORGANIZED HEALTH CARE EDUCATION/TRAINING PROGRAM

## 2021-06-27 PROCEDURE — 85027 COMPLETE CBC AUTOMATED: CPT

## 2021-06-27 RX ORDER — ATROPA BELLADONNA AND OPIUM 16.2; 6 MG/1; MG/1
60 SUPPOSITORY RECTAL EVERY 8 HOURS PRN
Status: DISCONTINUED | OUTPATIENT
Start: 2021-06-27 | End: 2021-06-29 | Stop reason: HOSPADM

## 2021-06-27 RX ORDER — OXYCODONE HYDROCHLORIDE AND ACETAMINOPHEN 5; 325 MG/1; MG/1
1 TABLET ORAL EVERY 4 HOURS PRN
Status: DISCONTINUED | OUTPATIENT
Start: 2021-06-27 | End: 2021-06-29 | Stop reason: HOSPADM

## 2021-06-27 RX ADMIN — ATORVASTATIN CALCIUM 40 MG: 20 TABLET, FILM COATED ORAL at 21:44

## 2021-06-27 RX ADMIN — ATROPA BELLADONNA AND OPIUM 60 MG: 16.2; 6 SUPPOSITORY RECTAL at 14:23

## 2021-06-27 RX ADMIN — MORPHINE SULFATE 4 MG: 4 INJECTION, SOLUTION INTRAMUSCULAR; INTRAVENOUS at 12:11

## 2021-06-27 RX ADMIN — SODIUM CHLORIDE 75 ML/HR: 9 INJECTION, SOLUTION INTRAVENOUS at 01:39

## 2021-06-27 RX ADMIN — SODIUM CHLORIDE 1000 MG: 9 INJECTION INTRAMUSCULAR; INTRAVENOUS; SUBCUTANEOUS at 06:14

## 2021-06-27 RX ADMIN — METOPROLOL TARTRATE 12.5 MG: 25 TABLET, FILM COATED ORAL at 10:22

## 2021-06-27 RX ADMIN — OXYCODONE AND ACETAMINOPHEN 1 TABLET: 5; 325 TABLET ORAL at 16:53

## 2021-06-27 RX ADMIN — METOPROLOL TARTRATE 12.5 MG: 25 TABLET, FILM COATED ORAL at 21:44

## 2021-06-27 RX ADMIN — SODIUM CHLORIDE: 9 INJECTION, SOLUTION INTRAVENOUS at 18:09

## 2021-06-27 ASSESSMENT — PAIN SCALES - GENERAL
PAINLEVEL_OUTOF10: 6
PAINLEVEL_OUTOF10: 9
PAINLEVEL_OUTOF10: 10
PAINLEVEL_OUTOF10: 0
PAINLEVEL_OUTOF10: 7

## 2021-06-27 NOTE — CONSULTS
INPATIENT CONSULTATION RECORD FOR  6/27/2021      [facility]  [address] [facility], [address] [facility]  [phone]        REASON FOR CONSULTATION:      Gross hematuria and bilateral hydronephrosis. HISTORY OF PRESENT ILLNESS:      The patient is a 70 y.o. male patient who presents with Gross hematuria. Pt had TURP with Dr. Michael Damon in February of 2021. He has been doing well. Some weak stream.   Urine became bloody several days ago. He denies taking a blood thinner. He did have a history of radiation for rectal cancer. 25 F [address] cheney was placed in the ER. Denies previous gross hematuria. No nausea or emesis.    No flank pain      Past Medical History:   Diagnosis Date    CAD (coronary artery disease)     HTN (hypertension)     Hyperlipidemia     [facility]STEMI [facility]non-ST elevated [facility] infarction) [facility]     Rectal cancer ([facility]     Tobacco abuse, in remission          Past Surgical History:   Procedure Laterality Date    COLONOSCOPY      CORONARY ARTERY BYPASS GRAFT N/A 12/10/2020    CORONARY ARTERY BYPASS, BELINDA performed by Eunice Gu DO at [address] [place] [address] Left     RECTAL SURGERY      TURP N/A 2/12/2021    CYSTOSCOPY RETROGRADE PYELOGRAM PLASMA LOOP TRANSURETHRAL RESECTION PROSTATE performed by Danyelle Ramirez DO at [facility] OR       Medications Prior to Admission:    Medications Prior to Admission: Cholecalciferol (VITAMIN D3) 125 MCG (5000 UT) CAPS, Take 5,000 Units by mouth daily  atorvastatin (LIPITOR) 40 MG tablet, Take 1 tablet by mouth nightly  clopidogrel (PLAVIX) 75 MG tablet, Take 1 tablet by mouth daily  magnesium hydroxide (MILK OF MAGNESIA) 400 MG/5ML suspension, Take 5 mLs by mouth daily as needed for Constipation  metoprolol tartrate (LOPRESSOR) 25 MG tablet, Take 0.5 tablets by mouth 2 times daily  aspirin 81 MG EC tablet, Take 1 tablet by mouth daily  docusate sodium (COLACE) 100 MG capsule, Take 1 capsule by mouth 2 times daily as needed for Constipation (constipation)    Allergies:    Cipro [ciprofloxacin hcl]    Social History:    reports that he quit smoking about 21 years ago. His smoking use included cigarettes. He has a 33.00 pack-year smoking history. He has never used smokeless tobacco. He reports current alcohol use of about 2.0 standard drinks of alcohol per week. He reports that he does not use drugs. Family History:   Non-contributory to this Urological problem  family history is not on file. REVIEW OF SYSTEMS:  Respiratory: negative for cough and hemoptysis  Cardiovascular: negative for chest pain and dyspnea  Gastrointestinal: negative for abdominal pain, diarrhea, nausea and vomiting  Derm: negative for rash and skin lesion(s)  Neurological: negative for seizures and tremors  Endocrine: negative for diabetic symptoms including polydipsia and polyuria  : As above in the HPI, otherwise negative  All other systems negative    PHYSICAL EXAM:    Vitals:  BP (!) 140/68   Pulse 96   Temp 98.8 °F (37.1 °C) (Oral)   Resp 16   Ht 5' 10\" (1.778 m)   Wt 194 lb 1.6 oz (88 kg)   SpO2 96%   BMI 27.85 kg/m²     General:  Awake, alert, oriented X 3. Well developed, well nourished, well groomed. No apparent distress. HEENT:  Normocephalic, atraumatic. Pupils equal, round. No scleral icterus. No conjunctival injection. Normal lips, teeth, and gums. No nasal discharge. Neck:  Supple, no masses. Heart:  RRR  Lungs:  No audible wheezing. Respirations symmetric and non-labored.   Abdomen:  soft, nontender, no masses, no organomegaly, no peritoneal signs  Extremities:  No clubbing, cyanosis, or edema  Skin:  Warm and dry, no open lesions or rashes  Neuro:  Cranial nerves 2-12 intact, no focal deficits  Rectal: deferred  Genitalia:  Uncircumcised phallus, scrotal edema 22 F [address] cheney in place    LABS:    Lab Results   Component Value Date    WBC 14.9 (H) 06/27/2021    HGB 7.7 (L) 06/27/2021    HCT 24.0 (L) 06/27/2021    MCV 89.9 06/27/2021     06/27/2021       Lab Results   Component Value Date    CREATININE 1.7 (H) 06/27/2021       Lab Results   Component Value Date    PSA 0.30 12/12/2020       Lab Results   Component Value Date    LABURIN <10,000 CFU/mL  Gram positive organism   (A) 12/08/2020    LABURIN >100,000 CFU/ml 12/08/2020       No results found for: BC    No results found for: BLOODCULT2     Imaging:   Impression   No evidence of aortic dissection or aneurysmal dilatation.       Bilateral hydronephrosis and bilateral hydroureter.       Perinephric stranding which may be chronic, or related to obstructive   uropathy.  Renal inflammatory process cannot be excluded.  No striated   nephrogram to suggest pyelonephritis.       Heterogeneous increased attenuation within the urinary bladder consistent   with hematuria or clot.       Nonspecific stranding of the subcutaneous fat at the base of the penis which   may represent edema or cellulitis.       Bilateral hydrocele, left greater than right. ASSESSMENT:   69 y/o M with gross hematuria, Clot retention, s/p TURP in 2/2021    PLAN:    Pt had 22 F 3 way cheney placed in ER. CT reviewed which showed clot retention with bilateral hydroureteronephrosis. I hand irrigated 2 liters of saline through the catheter and got about 800cc of clot from the bladder. At the end of irrigation his catheter was light pink. We will start CBI. B and O suppository for bladder spasms. Will make NPO at midnight in case hematuria persists and he needs clot evacuation. Trend Hgb  Transfusions per primary. Will follow.          Diane Ames MD,   10:02 AM  6/27/2021

## 2021-06-27 NOTE — PROGRESS NOTES
Admitting Date and Time: 6/25/2021  9:23 PM  Admit Dx: MAYRA (acute kidney injury) ([facility] [address] [N17.9]    Subjective:     lower ab pain radiating to back mildly improved  Per RN: no other issues    ROS: denies fever, chills, cp, sob, n/v, HA unless stated above.      atorvastatin  40 mg Oral Nightly    metoprolol tartrate  12.5 mg Oral BID    sodium chloride flush  5-40 mL Intravenous 2 times per day    cefTRIAXone (ROCEPHIN) IV  1,000 mg Intravenous Q24H     opium-belladonna, 60 mg, Q8H PRN  sodium chloride flush, 5-40 mL, PRN  sodium chloride, 25 mL, PRN  ondansetron, 4 mg, Q8H PRN   Or  ondansetron, 4 mg, Q6H PRN  polyethylene glycol, 17 g, Daily PRN  acetaminophen, 650 mg, Q6H PRN   Or  acetaminophen, 650 mg, Q6H PRN  morphine, 4 mg, Q4H PRN         Objective:    BP (!) 112/59   Pulse 96   Temp 98.8 °F (37.1 °C) (Oral)   Resp 16   Ht 5' 10\" (1.778 m)   Wt 194 lb 1.6 oz (88 kg)   SpO2 96%   BMI 27.85 kg/m²   General Appearance: alert and oriented to person, place and time and in no acute distress  Skin: warm and dry  Head: normocephalic and atraumatic  Eyes: pupils equal, round, and reactive to light, extraocular eye movements intact, conjunctivae normal  Neck: neck supple and non tender without mass   Pulmonary/Chest: clear to auscultation bilaterally- no wheezes, rales or rhonchi, normal air movement, no respiratory distress  Cardiovascular: normal rate, normal S1 and S2 and no carotid bruits  Abdomen: soft, non-tender, non-distended, normal bowel sounds, no masses or organomegaly  Extremities: no cyanosis, no clubbing and no  edema  Neurologic: no cranial nerve deficit and speech normal      Recent Labs     06/25/21 1925 06/27/21  0457    138   K 4.4 4.4    108*   CO2 19* 24   BUN 23 33*   CREATININE 1.6* 1.7*   GLUCOSE 199* 105*   CALCIUM 9.5 8.8       Recent Labs     06/25/21 1925   ALKPHOS 147*   PROT 8.1   LABALBU 3.6   BILITOT 0.4   AST 20   ALT 19       Recent Labs     06/25/21 1925 06/27/21  0457   WBC 21.8* 14.9*   RBC 3.89 2.67*   HGB 11.2* 7.7*   HCT 35.1* 24.0*   MCV 90.2 89.9   MCH 28.8 28.8   MCHC 31.9* 32.1   RDW 14.5 15.4*    281   MPV 9.4 9.7           Radiology:   CTA ABDOMEN PELVIS W CONTRAST   Final Result   No evidence of aortic dissection or aneurysmal dilatation. Bilateral hydronephrosis and bilateral hydroureter. Perinephric stranding which may be chronic, or related to obstructive   uropathy. Renal inflammatory process cannot be excluded. No striated   nephrogram to suggest pyelonephritis. Heterogeneous increased attenuation within the urinary bladder consistent   with hematuria or clot. Nonspecific stranding of the subcutaneous fat at the base of the penis which   may represent edema or cellulitis. Bilateral hydrocele, left greater than right. CT ABDOMEN PELVIS WO CONTRAST Additional Contrast? None   Final Result   Dilated urinary bladder containing a moderate to large amount of blood   products within the lumen of the bladder. There is also severe bilateral   hydroureteronephrosis. While this could be simple hematuria, an  underlying   mass cannot be excluded. Additionally, there is subcutaneous fat stranding   along the base of the penis, of uncertain etiology or clinical significance. Urology consultation is recommended. Relatively ahaustral appearance of the descending and rectosigmoid colon. Please correlate for prior history of ulcerative colitis. Large gastric diverticulum versus a large amount of undigested material and   gas within the gastric cardia. This would be better evaluated with upper   endoscopy, if clinically appropriate. Incidental findings as above. Assessment:  Active Problems:    MAYRA (acute kidney injury) ([address]  Resolved Problems:    * No resolved hospital problems.  *      Plan:  History of present illness from History and Physical:   Patient is a 79-year-old male with past medical history significant for rectal cancer, radiation, NSTEMI, hypertension, coronary artery disease, hyperlipidemia, CABG, TURP. He presented to the emergency department with complaints of 3 days of abdominal pain, back pain, hematuria. He states that his pain is located in the lower abdomen and suprapubic region. There is radiation to his back. He is also complaining of gross hematuria. His TURP procedure was performed in February of this year. In the ED, hemoglobin is 11.2 initially. After multiple bags of fluid through CBI, urine is still red. Work-up also shows bilateral hydronephrosis, elevated creatinine of 1.6, up from baseline of 0.9. Medicine was asked to admit for urology consultation and further monitoring. 1) MAYRA / Gross hematuria / Bilateral hydronephrosis  R/o obstruction from blood clots per urology. started on CBI in the ED.   creatinine February 0.9.    urology irrigated Estrada for clot removal until pink. They plan to keep n.p.o. after midnight in case needs clot evacuation tomorrow    2) acute blood loss anemia Monitor hemoglobin. No need for transfusion now    3) Coronary artery disease  Hold home dose of aspirin and Plavix due to bleeding  Continue home dose of statin    4) Hypertension: Continue home dose of metoprolol   5)  dnrcca. I d/w patient and rn had witnessed discussion between patient and wife upon admission. patient agrees wife is aware  6) DVT prophylaxis: SCDs  7)  dispo home when ready      NOTE: This report was transcribed using voice recognition software. Every effort was made to ensure accuracy; however, inadvertent computerized transcription errors may be present.      Electronically signed by Maira Silverio MD on 6/27/2021 at 12:44 PM

## 2021-06-27 NOTE — ED NOTES
Report given to [facility] on 3rd floor     Maxx Yee, [place]  06/26/21 2042

## 2021-06-28 ENCOUNTER — ANESTHESIA (OUTPATIENT)
Dept: OPERATING ROOM | Age: 76
DRG: 663 | End: 2021-06-28
Payer: MEDICARE

## 2021-06-28 ENCOUNTER — ANESTHESIA EVENT (OUTPATIENT)
Dept: OPERATING ROOM | Age: 76
DRG: 663 | End: 2021-06-28
Payer: MEDICARE

## 2021-06-28 ENCOUNTER — APPOINTMENT (OUTPATIENT)
Dept: GENERAL RADIOLOGY | Age: 76
DRG: 663 | End: 2021-06-28
Payer: MEDICARE

## 2021-06-28 VITALS
OXYGEN SATURATION: 100 % | SYSTOLIC BLOOD PRESSURE: 103 MMHG | RESPIRATION RATE: 14 BRPM | DIASTOLIC BLOOD PRESSURE: 62 MMHG

## 2021-06-28 LAB
ABO/RH: NORMAL
ANION GAP SERPL CALCULATED.3IONS-SCNC: 6 MMOL/L (ref 7–16)
ANTIBODY SCREEN: NORMAL
BLOOD BANK DISPENSE STATUS: NORMAL
BLOOD BANK PRODUCT CODE: NORMAL
BPU ID: NORMAL
BUN BLDV-MCNC: 26 MG/DL (ref 6–23)
CALCIUM SERPL-MCNC: 8.5 MG/DL (ref 8.6–10.2)
CHLORIDE BLD-SCNC: 109 MMOL/L (ref 98–107)
CO2: 23 MMOL/L (ref 22–29)
CREAT SERPL-MCNC: 1.2 MG/DL (ref 0.7–1.2)
DESCRIPTION BLOOD BANK: NORMAL
GFR AFRICAN AMERICAN: >60
GFR NON-AFRICAN AMERICAN: 59 ML/MIN/1.73
GLUCOSE BLD-MCNC: 89 MG/DL (ref 74–99)
HCT VFR BLD CALC: 22.9 % (ref 37–54)
HEMOGLOBIN: 7.1 G/DL (ref 12.5–16.5)
MCH RBC QN AUTO: 28.9 PG (ref 26–35)
MCHC RBC AUTO-ENTMCNC: 31 % (ref 32–34.5)
MCV RBC AUTO: 93.1 FL (ref 80–99.9)
PDW BLD-RTO: 15.3 FL (ref 11.5–15)
PLATELET # BLD: 225 E9/L (ref 130–450)
PMV BLD AUTO: 10.2 FL (ref 7–12)
POTASSIUM SERPL-SCNC: 4.4 MMOL/L (ref 3.5–5)
RBC # BLD: 2.46 E12/L (ref 3.8–5.8)
SODIUM BLD-SCNC: 138 MMOL/L (ref 132–146)
WBC # BLD: 9.9 E9/L (ref 4.5–11.5)

## 2021-06-28 PROCEDURE — 3700000000 HC ANESTHESIA ATTENDED CARE: Performed by: UROLOGY

## 2021-06-28 PROCEDURE — 51700 IRRIGATION OF BLADDER: CPT

## 2021-06-28 PROCEDURE — 6370000000 HC RX 637 (ALT 250 FOR IP): Performed by: INTERNAL MEDICINE

## 2021-06-28 PROCEDURE — 2580000003 HC RX 258: Performed by: NURSE ANESTHETIST, CERTIFIED REGISTERED

## 2021-06-28 PROCEDURE — 86900 BLOOD TYPING SEROLOGIC ABO: CPT

## 2021-06-28 PROCEDURE — 3700000001 HC ADD 15 MINUTES (ANESTHESIA): Performed by: UROLOGY

## 2021-06-28 PROCEDURE — 2709999900 HC NON-CHARGEABLE SUPPLY: Performed by: UROLOGY

## 2021-06-28 PROCEDURE — 6360000002 HC RX W HCPCS

## 2021-06-28 PROCEDURE — 1200000000 HC SEMI PRIVATE

## 2021-06-28 PROCEDURE — 6360000002 HC RX W HCPCS: Performed by: INTERNAL MEDICINE

## 2021-06-28 PROCEDURE — P9016 RBC LEUKOCYTES REDUCED: HCPCS

## 2021-06-28 PROCEDURE — 86850 RBC ANTIBODY SCREEN: CPT

## 2021-06-28 PROCEDURE — 86901 BLOOD TYPING SEROLOGIC RH(D): CPT

## 2021-06-28 PROCEDURE — 99233 SBSQ HOSP IP/OBS HIGH 50: CPT | Performed by: INTERNAL MEDICINE

## 2021-06-28 PROCEDURE — 86920 COMPATIBILITY TEST SPIN: CPT

## 2021-06-28 PROCEDURE — 2580000003 HC RX 258: Performed by: INTERNAL MEDICINE

## 2021-06-28 PROCEDURE — 3600000003 HC SURGERY LEVEL 3 BASE: Performed by: UROLOGY

## 2021-06-28 PROCEDURE — 74400 UROGRAPHY IV +-KUB TOMOG: CPT

## 2021-06-28 PROCEDURE — 0W3R8ZZ CONTROL BLEEDING IN GENITOURINARY TRACT, VIA NATURAL OR ARTIFICIAL OPENING ENDOSCOPIC: ICD-10-PCS | Performed by: UROLOGY

## 2021-06-28 PROCEDURE — 2580000003 HC RX 258: Performed by: UROLOGY

## 2021-06-28 PROCEDURE — C1758 CATHETER, URETERAL: HCPCS | Performed by: UROLOGY

## 2021-06-28 PROCEDURE — 85027 COMPLETE CBC AUTOMATED: CPT

## 2021-06-28 PROCEDURE — 7100000000 HC PACU RECOVERY - FIRST 15 MIN: Performed by: UROLOGY

## 2021-06-28 PROCEDURE — 80048 BASIC METABOLIC PNL TOTAL CA: CPT

## 2021-06-28 PROCEDURE — 36430 TRANSFUSION BLD/BLD COMPNT: CPT

## 2021-06-28 PROCEDURE — 7100000001 HC PACU RECOVERY - ADDTL 15 MIN: Performed by: UROLOGY

## 2021-06-28 PROCEDURE — 3600000013 HC SURGERY LEVEL 3 ADDTL 15MIN: Performed by: UROLOGY

## 2021-06-28 PROCEDURE — 36415 COLL VENOUS BLD VENIPUNCTURE: CPT

## 2021-06-28 RX ORDER — SODIUM CHLORIDE 9 MG/ML
INJECTION, SOLUTION INTRAVENOUS PRN
Status: DISCONTINUED | OUTPATIENT
Start: 2021-06-28 | End: 2021-06-29 | Stop reason: HOSPADM

## 2021-06-28 RX ORDER — ONDANSETRON 2 MG/ML
4 INJECTION INTRAMUSCULAR; INTRAVENOUS
Status: DISCONTINUED | OUTPATIENT
Start: 2021-06-28 | End: 2021-06-28 | Stop reason: HOSPADM

## 2021-06-28 RX ORDER — SODIUM CHLORIDE, SODIUM LACTATE, POTASSIUM CHLORIDE, CALCIUM CHLORIDE 600; 310; 30; 20 MG/100ML; MG/100ML; MG/100ML; MG/100ML
INJECTION, SOLUTION INTRAVENOUS CONTINUOUS
Status: DISCONTINUED | OUTPATIENT
Start: 2021-06-28 | End: 2021-06-29

## 2021-06-28 RX ORDER — PROMETHAZINE HYDROCHLORIDE 25 MG/ML
6.25 INJECTION, SOLUTION INTRAMUSCULAR; INTRAVENOUS
Status: DISCONTINUED | OUTPATIENT
Start: 2021-06-28 | End: 2021-06-28 | Stop reason: HOSPADM

## 2021-06-28 RX ORDER — MEPERIDINE HYDROCHLORIDE 25 MG/ML
12.5 INJECTION INTRAMUSCULAR; INTRAVENOUS; SUBCUTANEOUS EVERY 5 MIN PRN
Status: DISCONTINUED | OUTPATIENT
Start: 2021-06-28 | End: 2021-06-28 | Stop reason: HOSPADM

## 2021-06-28 RX ORDER — MORPHINE SULFATE 2 MG/ML
2 INJECTION, SOLUTION INTRAMUSCULAR; INTRAVENOUS EVERY 5 MIN PRN
Status: DISCONTINUED | OUTPATIENT
Start: 2021-06-28 | End: 2021-06-28 | Stop reason: HOSPADM

## 2021-06-28 RX ORDER — SODIUM CHLORIDE 9 MG/ML
INJECTION, SOLUTION INTRAVENOUS CONTINUOUS PRN
Status: DISCONTINUED | OUTPATIENT
Start: 2021-06-28 | End: 2021-06-28 | Stop reason: SDUPTHER

## 2021-06-28 RX ORDER — FENTANYL CITRATE 50 UG/ML
INJECTION, SOLUTION INTRAMUSCULAR; INTRAVENOUS PRN
Status: DISCONTINUED | OUTPATIENT
Start: 2021-06-28 | End: 2021-06-28 | Stop reason: SDUPTHER

## 2021-06-28 RX ORDER — PROPOFOL 10 MG/ML
INJECTION, EMULSION INTRAVENOUS CONTINUOUS PRN
Status: DISCONTINUED | OUTPATIENT
Start: 2021-06-28 | End: 2021-06-28 | Stop reason: SDUPTHER

## 2021-06-28 RX ADMIN — PROPOFOL 20 MG: 10 INJECTION, EMULSION INTRAVENOUS at 15:12

## 2021-06-28 RX ADMIN — PROPOFOL 50 MG: 10 INJECTION, EMULSION INTRAVENOUS at 14:43

## 2021-06-28 RX ADMIN — FENTANYL CITRATE 100 MCG: 50 INJECTION, SOLUTION INTRAMUSCULAR; INTRAVENOUS at 14:42

## 2021-06-28 RX ADMIN — FENTANYL CITRATE 50 MCG: 50 INJECTION, SOLUTION INTRAMUSCULAR; INTRAVENOUS at 14:54

## 2021-06-28 RX ADMIN — SODIUM CHLORIDE: 9 INJECTION, SOLUTION INTRAVENOUS at 14:33

## 2021-06-28 RX ADMIN — SODIUM CHLORIDE 1000 MG: 9 INJECTION INTRAMUSCULAR; INTRAVENOUS; SUBCUTANEOUS at 05:30

## 2021-06-28 RX ADMIN — HYDROMORPHONE HYDROCHLORIDE 0.5 MG: 1 INJECTION, SOLUTION INTRAMUSCULAR; INTRAVENOUS; SUBCUTANEOUS at 16:03

## 2021-06-28 RX ADMIN — PROPOFOL 20 MG: 10 INJECTION, EMULSION INTRAVENOUS at 15:19

## 2021-06-28 RX ADMIN — METOPROLOL TARTRATE 12.5 MG: 25 TABLET, FILM COATED ORAL at 21:03

## 2021-06-28 RX ADMIN — PROPOFOL 75 MCG/KG/MIN: 10 INJECTION, EMULSION INTRAVENOUS at 14:42

## 2021-06-28 RX ADMIN — ATORVASTATIN CALCIUM 40 MG: 20 TABLET, FILM COATED ORAL at 21:04

## 2021-06-28 RX ADMIN — PROPOFOL 10 MG: 10 INJECTION, EMULSION INTRAVENOUS at 15:09

## 2021-06-28 RX ADMIN — Medication 0.5 MG: at 15:42

## 2021-06-28 RX ADMIN — HYDROMORPHONE HYDROCHLORIDE 0.5 MG: 1 INJECTION, SOLUTION INTRAMUSCULAR; INTRAVENOUS; SUBCUTANEOUS at 15:42

## 2021-06-28 RX ADMIN — PROPOFOL 40 MG: 10 INJECTION, EMULSION INTRAVENOUS at 14:55

## 2021-06-28 RX ADMIN — PROPOFOL 10 MG: 10 INJECTION, EMULSION INTRAVENOUS at 15:10

## 2021-06-28 RX ADMIN — METOPROLOL TARTRATE 12.5 MG: 25 TABLET, FILM COATED ORAL at 08:31

## 2021-06-28 RX ADMIN — SODIUM CHLORIDE, POTASSIUM CHLORIDE, SODIUM LACTATE AND CALCIUM CHLORIDE: 600; 310; 30; 20 INJECTION, SOLUTION INTRAVENOUS at 16:57

## 2021-06-28 RX ADMIN — Medication 0.5 MG: at 16:03

## 2021-06-28 RX ADMIN — PROPOFOL 10 MG: 10 INJECTION, EMULSION INTRAVENOUS at 15:16

## 2021-06-28 RX ADMIN — SODIUM CHLORIDE: 9 INJECTION, SOLUTION INTRAVENOUS at 05:26

## 2021-06-28 ASSESSMENT — PULMONARY FUNCTION TESTS
PIF_VALUE: 1
PIF_VALUE: 0
PIF_VALUE: 0
PIF_VALUE: 1
PIF_VALUE: 1
PIF_VALUE: 0
PIF_VALUE: 1
PIF_VALUE: 0
PIF_VALUE: 1
PIF_VALUE: 0
PIF_VALUE: 1
PIF_VALUE: 0
PIF_VALUE: 1

## 2021-06-28 ASSESSMENT — PAIN DESCRIPTION - DESCRIPTORS: DESCRIPTORS: ACHING;DISCOMFORT

## 2021-06-28 ASSESSMENT — PAIN SCALES - GENERAL
PAINLEVEL_OUTOF10: 7
PAINLEVEL_OUTOF10: 2
PAINLEVEL_OUTOF10: 8

## 2021-06-28 ASSESSMENT — LIFESTYLE VARIABLES: SMOKING_STATUS: 0

## 2021-06-28 ASSESSMENT — PAIN DESCRIPTION - PROGRESSION: CLINICAL_PROGRESSION: GRADUALLY IMPROVING

## 2021-06-28 ASSESSMENT — PAIN DESCRIPTION - PAIN TYPE: TYPE: ACUTE PAIN;SURGICAL PAIN

## 2021-06-28 ASSESSMENT — PAIN DESCRIPTION - LOCATION
LOCATION: BUTTOCKS
LOCATION: PENIS

## 2021-06-28 NOTE — PROGRESS NOTES
6/28/2021 10:20 AM  Service: Urology  Group: [facility] (James/Donny/Can)    Cristian Last  [phone]    Subjective:    Urine light pink with CBI at moderate rate  Family present  Denies pain or discomfort     Review of Systems  Constitutional: No fever or chills   Respiratory: negative for cough and hemoptysis  Cardiovascular: negative for chest pain and dyspnea  Gastrointestinal: negative for abdominal pain, diarrhea, nausea and vomiting   : See above  Derm: negative for rash and skin lesion(s)  Neurological: negative for seizures and tremors  Musculoskeletal: Negative    Psychiatric: Negative   All other reviews are negative      Scheduled Meds:   atorvastatin  40 mg Oral Nightly    metoprolol tartrate  12.5 mg Oral BID    sodium chloride flush  5-40 mL Intravenous 2 times per day    cefTRIAXone (ROCEPHIN) IV  1,000 mg Intravenous Q24H       Objective:  Vitals:    06/28/21 0525   BP: 122/60   Pulse: 67   Resp: 18   Temp: 98.6 °F (37 °C)   SpO2: 98%         Allergies: Cipro [ciprofloxacin hcl]    General Appearance: alert and oriented to person, place and time and in no acute distress  Skin: no rash or erythema  Head: normocephalic and atraumatic  Pulmonary/Chest: normal air movement, no respiratory distress  Abdomen: soft, non-tender, non-distended  Genitourinary: cheney light pink with CBI at moderate rate  Extremities: no cyanosis, clubbing or edema         Labs:     Recent Labs     06/28/21  0507      K 4.4   *   CO2 23   BUN 26*   CREATININE 1.2   GLUCOSE 89   CALCIUM 8.5*       Lab Results   Component Value Date    HGB 7.1 06/28/2021    HCT 22.9 06/28/2021         Assessment/Plan:  BPH s/p TURP (February 2021 w/ Dr. Sherald Spurling [facility])  Gross hematuria  Bilateral hydronephrosis  Hx of radiation for rectal cancer   Anemia     His cheney was hand irrigated with approximately 1000cc of saline  for large amount of clot retrieval. CBI was restarted, urine pink    Cont to watch the creatinine, trending down 1.6>>>1.2  Cont to watch the hemoglobin  Transfusions per primary, he is to receive 1 unit today   His Plavix is on hold   Urine culture pending  Cont antibiotics per primary, on Rocephin    Cont the manual irrigations every 2 hours and PRN  Cont the CBI  Keep NPO  Will add on today for cystoscopy, retrograde pyelogram, clot evacuation, fulguration so long as the surgery schedule allows for this   Will follow       ARGENTINA Anderson - CNP   [facility]  [facility]

## 2021-06-28 NOTE — PROGRESS NOTES
Patient's catheter irrigated with 240cc NS, small amount of clots returned. Patient irrigated until clear.

## 2021-06-28 NOTE — PROGRESS NOTES
Patient went to surgery with blood running. Informed RN in holding that vitals needed done when blood was complete.

## 2021-06-28 NOTE — ANESTHESIA PRE PROCEDURE
Department of Anesthesiology  Preprocedure Note       Name:  Darleen Johnson   Age:  70 y.o.  :  1945                                          MRN:  [id number]         Date:  2021      Surgeon: Ok Floor):  Mraisabel Jorgensen MD    Procedure: Procedure(s):  CYSTOSCOPY RETROGRADE PYELOGRAM, CLOT EVACUATION    Medications prior to admission:   Prior to Admission medications    Medication Sig Start Date End Date Taking?  Authorizing Provider   Cholecalciferol (VITAMIN D3) 125 MCG (5000 UT) CAPS Take 5,000 Units by mouth daily   Yes Historical Provider, MD   atorvastatin (LIPITOR) 40 MG tablet Take 1 tablet by mouth nightly 21   Davy Anthony MD   clopidogrel (PLAVIX) 75 MG tablet Take 1 tablet by mouth daily 21   Davy Anthony MD   magnesium hydroxide (MILK OF MAGNESIA) 400 MG/5ML suspension Take 5 mLs by mouth daily as needed for Constipation    Historical Provider, MD   metoprolol tartrate (LOPRESSOR) 25 MG tablet Take 0.5 tablets by mouth 2 times daily 21   Davy Anthony MD   aspirin 81 MG EC tablet Take 1 tablet by mouth daily 20   EDUARDO Enamorado   docusate sodium (COLACE) 100 MG capsule Take 1 capsule by mouth 2 times daily as needed for Constipation (constipation) 20   EDUARDO Enamorado       Current medications:    Current Facility-Administered Medications   Medication Dose Route Frequency Provider Last Rate Last Admin    0.9 % sodium chloride infusion   Intravenous PRN Estrada Grant MD        opium-belladonna (B&O SUPPRETTES) 16.2-60 MG suppository 60 mg  60 mg Rectal Q8H PRN Nato Hernandez MD   60 mg at 21 1423    oxyCODONE-acetaminophen (PERCOCET) 5-325 MG per tablet 1 tablet  1 tablet Oral Q4H PRN Mychal Craven MD   1 tablet at 21 1653    atorvastatin (LIPITOR) tablet 40 mg  40 mg Oral Nightly Particia Nadine, DO   40 mg at 214    metoprolol tartrate (LOPRESSOR) tablet 12.5 mg  12.5 mg Oral BID Particia Nadine, DO   12.5 mg at 06/28/21 0831    sodium chloride flush 0.9 % injection 5-40 mL  5-40 mL Intravenous 2 times per day Glenard Jacobsen, DO        sodium chloride flush 0.9 % injection 5-40 mL  5-40 mL Intravenous PRN Glenard Jacobsen, DO        0.9 % sodium chloride infusion  25 mL Intravenous PRN Glenard Jacobsen, DO        ondansetron (ZOFRAN-ODT) disintegrating tablet 4 mg  4 mg Oral Q8H PRN Glenard Jacobsen, DO        Or    ondansetron (ZOFRAN) injection 4 mg  4 mg Intravenous Q6H PRN Glenard Jacobsen, DO        polyethylene glycol (GLYCOLAX) packet 17 g  17 g Oral Daily PRN Glenard Jacobsen, DO        acetaminophen (TYLENOL) tablet 650 mg  650 mg Oral Q6H PRN Glenard Jacobsen, DO        Or    acetaminophen (TYLENOL) suppository 650 mg  650 mg Rectal Q6H PRN Glenard Jacobsen, DO        0.9 % sodium chloride infusion   Intravenous Continuous Glenard Jacobsen, DO 75 mL/hr at 06/28/21 0526 New Bag at 06/28/21 0526    cefTRIAXone (ROCEPHIN) 1,000 mg in sodium chloride (PF) 10 mL IV syringe  1,000 mg Intravenous Q24H Glenard Jacobsen, DO   1,000 mg at 06/28/21 0530    morphine injection 4 mg  4 mg Intravenous Q4H PRN Glenard Jacobsen, DO   4 mg at 06/27/21 1211       Allergies:     Allergies   Allergen Reactions    Cipro [Ciprofloxacin Hcl] Hives       Problem List:    Patient Active Problem List   Diagnosis Code    Chest pain R07.9    Anemia D64.9    Leukocytosis D72.829    Essential hypertension I10    History of rectal cancer Z85.048    [facility]non-ST elevated [facility] I21.4    Coronary artery disease involving native coronary artery of native heart without angina pectoris I25.10    Pure hypercholesterolemia E78.00    BPH with urinary obstruction N40.1, N13.8    MAYRA (acute kidney injury) ([facility] N17.9       Past Medical History:        Diagnosis Date    CAD (coronary artery disease)     HTN (hypertension)     Hyperlipidemia     NSTEMI (non-ST elevated myocardial infarction) ([address]     Rectal cancer ([facility] [address]     Tobacco 06/28/2021    K 4.4 06/27/2021     06/28/2021    CO2 23 06/28/2021    BUN 26 06/28/2021    CREATININE 1.2 06/28/2021    GFRAA >60 06/28/2021    LABGLOM 59 06/28/2021    GLUCOSE 89 06/28/2021    PROT 8.1 06/25/2021    CALCIUM 8.5 06/28/2021    BILITOT 0.4 06/25/2021    ALKPHOS 147 06/25/2021    AST 20 06/25/2021    ALT 19 06/25/2021       POC Tests: No results for input(s): POCGLU, POCNA, POCK, POCCL, POCBUN, POCHEMO, POCHCT in the last 72 hours. Coags:   Lab Results   Component Value Date    PROTIME 17.6 12/10/2020    INR 1.5 12/10/2020    APTT 28.1 12/10/2020       HCG (If Applicable): No results found for: PREGTESTUR, PREGSERUM, HCG, HCGQUANT     ABGs:   Lab Results   Component Value Date    PO2ART 228.8 12/10/2020    ELF8CZZ 38.5 12/10/2020    LUL8DSY 23.2 12/10/2020        Type & Screen (If Applicable):  No results found for: LABABO, LABRH    Drug/Infectious Status (If Applicable):  No results found for: HIV, HEPCAB    COVID-19 Screening (If Applicable):   Lab Results   Component Value Date    COVID19 Not Detected 02/08/2021         Anesthesia Evaluation  Patient summary reviewed and Nursing notes reviewed no history of anesthetic complications:   Airway: Mallampati: II  TM distance: >3 FB   Neck ROM: full  Mouth opening: > = 3 FB Dental:    (+) edentulous      Pulmonary:Negative Pulmonary ROS breath sounds clear to auscultation      (-) not a current smoker                           Cardiovascular:  Exercise tolerance: good (>4 METS),   (+) hypertension:, past MI:, CAD:, CABG/stent:, hyperlipidemia        Rhythm: regular  Rate: normal           Beta Blocker:  Dose within 24 Hrs         Neuro/Psych:   Negative Neuro/Psych ROS              GI/Hepatic/Renal:            ROS comment: BPH  Hematuria. Endo/Other:    (+) blood dyscrasia (acute blood loss anemia-receiving one unit PRBC today )::., .                 Abdominal:         (-) obese       Vascular: negative vascular ROS.          Other Findings:

## 2021-06-28 NOTE — CARE COORDINATION
CM note: Pt is OR, unable to see for transition of care planning. Per chart review pt lives with spouse, has a hx of [facility]. Will follow up with patient tomorrow 6/29.

## 2021-06-28 NOTE — PROGRESS NOTES
Department of Internal Medicine  General Internal Medicine  Attending Progress Note  Chief Complaint   Patient presents with    Hematuria    Abdominal Pain    Back Pain     SUBJECTIVE:    Reports that he is doing well. No fever or chill. No chest pain. Patient is aware of plans to transfuse 1 unit of blood today due to anemia.  He signed consent    OBJECTIVE      Medications    Current Facility-Administered Medications: 0.9 % sodium chloride infusion, , Intravenous, PRN  opium-belladonna (B&O SUPPRETTES) 16.2-60 MG suppository 60 mg, 60 mg, Rectal, Q8H PRN  oxyCODONE-acetaminophen (PERCOCET) 5-325 MG per tablet 1 tablet, 1 tablet, Oral, Q4H PRN  atorvastatin (LIPITOR) tablet 40 mg, 40 mg, Oral, Nightly  metoprolol tartrate (LOPRESSOR) tablet 12.5 mg, 12.5 mg, Oral, BID  sodium chloride flush 0.9 % injection 5-40 mL, 5-40 mL, Intravenous, 2 times per day  sodium chloride flush 0.9 % injection 5-40 mL, 5-40 mL, Intravenous, PRN  0.9 % sodium chloride infusion, 25 mL, Intravenous, PRN  ondansetron (ZOFRAN-ODT) disintegrating tablet 4 mg, 4 mg, Oral, Q8H PRN **OR** ondansetron (ZOFRAN) injection 4 mg, 4 mg, Intravenous, Q6H PRN  polyethylene glycol (GLYCOLAX) packet 17 g, 17 g, Oral, Daily PRN  acetaminophen (TYLENOL) tablet 650 mg, 650 mg, Oral, Q6H PRN **OR** acetaminophen (TYLENOL) suppository 650 mg, 650 mg, Rectal, Q6H PRN  0.9 % sodium chloride infusion, , Intravenous, Continuous  cefTRIAXone (ROCEPHIN) 1,000 mg in sodium chloride (PF) 10 mL IV syringe, 1,000 mg, Intravenous, Q24H  morphine injection 4 mg, 4 mg, Intravenous, Q4H PRN  Physical    VITALS:  /60   Pulse 67   Temp 98.6 °F (37 °C) (Oral)   Resp 18   Ht 5' 10\" (1.778 m)   Wt 194 lb 1.6 oz (88 kg)   SpO2 98%   BMI 27.85 kg/m²   CONSTITUTIONAL:  awake, alert, cooperative, no apparent distress, and appears stated age  EYES:  Lids and lashes normal, pupils equal, round and reactive to light, extra ocular muscles intact, sclera clear, conjunctiva normal  ENT:  Normocephalic, without obvious abnormality, atraumatic, sinuses nontender on palpation, external ears without lesions, oral pharynx with moist mucus membranes, tonsils without erythema or exudates, gums normal and good dentition. NECK:  Supple, symmetrical, trachea midline, no adenopathy, thyroid symmetric, not enlarged and no tenderness, skin normal  HEMATOLOGIC/LYMPHATICS:  no cervical lymphadenopathy  LUNGS:  No increased work of breathing, good air exchange, clear to auscultation bilaterally, no crackles or wheezing  CARDIOVASCULAR:  Normal apical impulse, regular rate and rhythm, normal S1 and S2, no S3 or S4, and no murmur noted  ABDOMEN:  No scars, normal bowel sounds, soft, non-distended, non-tender, no masses palpated, no hepatosplenomegally  MUSCULOSKELETAL:  there is no redness, warmth, or swelling of the joints  NEUROLOGIC:  No focal neuro deficit  SKIN:  no bruising or bleeding  Data    CBC:   Lab Results   Component Value Date    WBC 9.9 06/28/2021    RBC 2.46 06/28/2021    HGB 7.1 06/28/2021    HCT 22.9 06/28/2021    MCV 93.1 06/28/2021    MCH 28.9 06/28/2021    MCHC 31.0 06/28/2021    RDW 15.3 06/28/2021     06/28/2021    MPV 10.2 06/28/2021     BMP:    Lab Results   Component Value Date     06/28/2021    K 4.4 06/28/2021    K 4.4 06/27/2021     06/28/2021    CO2 23 06/28/2021    BUN 26 06/28/2021    LABALBU 3.6 06/25/2021    CREATININE 1.2 06/28/2021    CALCIUM 8.5 06/28/2021    GFRAA >60 06/28/2021    LABGLOM 59 06/28/2021    GLUCOSE 89 06/28/2021       ASSESSMENT AND PLAN      1. MAYRA (acute kidney injury) ([facility] [address]): Much improved. Suspected that this is related to hydronephrosis  Urology following  Continue IV fluid  Renal panel in am    2. Acute blood loss anemia: This is from hematuria  Etiology of hematuria is unknown  Possible cystoscopy  Ordered 1 unit of PRBC  Repeat CBC in am    3. Hypertension Essential: good control  Continue to monitor    4. Hyperlipidemia: on statin

## 2021-06-28 NOTE — PROGRESS NOTES
Patient irrigated with 500 cc normal saline. No clots returned. Irrigated fluid light pink in color.

## 2021-06-28 NOTE — BRIEF OP NOTE
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

## 2021-06-29 VITALS
HEIGHT: 70 IN | SYSTOLIC BLOOD PRESSURE: 118 MMHG | RESPIRATION RATE: 18 BRPM | BODY MASS INDEX: 27.79 KG/M2 | WEIGHT: 194.1 LBS | DIASTOLIC BLOOD PRESSURE: 63 MMHG | OXYGEN SATURATION: 97 % | HEART RATE: 70 BPM | TEMPERATURE: 98.6 F

## 2021-06-29 LAB
ANION GAP SERPL CALCULATED.3IONS-SCNC: 8 MMOL/L (ref 7–16)
BUN BLDV-MCNC: 22 MG/DL (ref 6–23)
CALCIUM SERPL-MCNC: 8.2 MG/DL (ref 8.6–10.2)
CHLORIDE BLD-SCNC: 107 MMOL/L (ref 98–107)
CO2: 22 MMOL/L (ref 22–29)
CREAT SERPL-MCNC: 1 MG/DL (ref 0.7–1.2)
GFR AFRICAN AMERICAN: >60
GFR NON-AFRICAN AMERICAN: >60 ML/MIN/1.73
GLUCOSE BLD-MCNC: 98 MG/DL (ref 74–99)
HCT VFR BLD CALC: 23.3 % (ref 37–54)
HEMOGLOBIN: 7.6 G/DL (ref 12.5–16.5)
MCH RBC QN AUTO: 29 PG (ref 26–35)
MCHC RBC AUTO-ENTMCNC: 32.6 % (ref 32–34.5)
MCV RBC AUTO: 88.9 FL (ref 80–99.9)
PDW BLD-RTO: 14.8 FL (ref 11.5–15)
PLATELET # BLD: 249 E9/L (ref 130–450)
PMV BLD AUTO: 10.2 FL (ref 7–12)
POTASSIUM SERPL-SCNC: 4.3 MMOL/L (ref 3.5–5)
RBC # BLD: 2.62 E12/L (ref 3.8–5.8)
SODIUM BLD-SCNC: 137 MMOL/L (ref 132–146)
URINE CULTURE, ROUTINE: NORMAL
WBC # BLD: 10.6 E9/L (ref 4.5–11.5)

## 2021-06-29 PROCEDURE — 2580000003 HC RX 258: Performed by: INTERNAL MEDICINE

## 2021-06-29 PROCEDURE — 85027 COMPLETE CBC AUTOMATED: CPT

## 2021-06-29 PROCEDURE — 6360000002 HC RX W HCPCS: Performed by: INTERNAL MEDICINE

## 2021-06-29 PROCEDURE — 6370000000 HC RX 637 (ALT 250 FOR IP): Performed by: INTERNAL MEDICINE

## 2021-06-29 PROCEDURE — 36415 COLL VENOUS BLD VENIPUNCTURE: CPT

## 2021-06-29 PROCEDURE — 99239 HOSP IP/OBS DSCHRG MGMT >30: CPT | Performed by: INTERNAL MEDICINE

## 2021-06-29 PROCEDURE — 80048 BASIC METABOLIC PNL TOTAL CA: CPT

## 2021-06-29 RX ORDER — LANOLIN ALCOHOL/MO/W.PET/CERES
325 CREAM (GRAM) TOPICAL 2 TIMES DAILY
Qty: 90 TABLET | Refills: 3 | Status: ON HOLD | OUTPATIENT
Start: 2021-06-29 | End: 2022-07-18 | Stop reason: ALTCHOICE

## 2021-06-29 RX ORDER — OXYCODONE HYDROCHLORIDE AND ACETAMINOPHEN 5; 325 MG/1; MG/1
1 TABLET ORAL EVERY 8 HOURS PRN
Qty: 9 TABLET | Refills: 0 | Status: SHIPPED | OUTPATIENT
Start: 2021-06-29 | End: 2021-07-02

## 2021-06-29 RX ADMIN — SODIUM CHLORIDE 125 MG: 9 INJECTION, SOLUTION INTRAVENOUS at 09:58

## 2021-06-29 RX ADMIN — SODIUM CHLORIDE, PRESERVATIVE FREE 10 ML: 5 INJECTION INTRAVENOUS at 08:12

## 2021-06-29 RX ADMIN — OXYCODONE AND ACETAMINOPHEN 1 TABLET: 5; 325 TABLET ORAL at 04:38

## 2021-06-29 RX ADMIN — METOPROLOL TARTRATE 12.5 MG: 25 TABLET, FILM COATED ORAL at 08:11

## 2021-06-29 RX ADMIN — SODIUM CHLORIDE 1000 MG: 9 INJECTION INTRAMUSCULAR; INTRAVENOUS; SUBCUTANEOUS at 05:05

## 2021-06-29 ASSESSMENT — PAIN SCALES - GENERAL
PAINLEVEL_OUTOF10: 6
PAINLEVEL_OUTOF10: 3
PAINLEVEL_OUTOF10: 0

## 2021-06-29 ASSESSMENT — PAIN DESCRIPTION - PAIN TYPE: TYPE: ACUTE PAIN;SURGICAL PAIN

## 2021-06-29 ASSESSMENT — PAIN DESCRIPTION - LOCATION: LOCATION: PENIS

## 2021-06-29 NOTE — OP NOTE
[address]                                OPERATIVE REPORT    PATIENT NAME: Jamia Campbell                    :        1945  MED REC NO:   [id number]                            ROOM:       4032  ACCOUNT NO:   [de-identified]                           ADMIT DATE: 2021  PROVIDER:     Lincoln Welch MD    DATE OF PROCEDURE:  2021    PREOPERATIVE DIAGNOSIS:  Hematuria. POSTOPERATIVE DIAGNOSIS  Hematuria secondary to hemorrhagic cystitis. OPERATION PERFORMED:  Cystopanendoscopy, retrograde pyelogram,  evacuation of clots, fulguration of bladder. SURGEON:  Lincoln Welch MD    ANESTHESIA:  LMAC. ESTIMATED BLOOD LOSS:  Less than 50 mL. OPERATION REPORT:  With the patient in the lithotomy position under  satisfactory sedation, his genitalia were examined. His left scrotum  was extremely leathery and not very mobile. His tissues seemed fixated  as his perineum and his rectal area. He was prepped and draped in a  sterile manner. A 21-Faroese panendoscope passed through the pendulous  urethra, but not very easily. The tissues were extremely rigid,  however, I was able to see he did not have a urethral stricture. His  prostatic fossa had been resected. There was no mobility to the  cystoscope. Once it was in the bladder, there were multiple clots which  were irrigated clear. I was able to see areas in the bladder that were  hemorrhagic. I did not see any obvious tumor areas, although his  examination was less than adequate in view of this severe rigidity of  his tissues. I did use a right angle lens and again no tumors were  found. Once the clots were evacuated from his bladder, retrograde  pyelograms were performed. Upper tracts were actually quite normal with  minor dilatation. There were no filling defects.   Using a Bugbee  electrode, I was able to cauterize multiple areas of his bladder. He  had good hemostasis at this point. A 24 30 mL three-way Estrada catheter  was inserted, placed on continuous irrigation. The patient has a great  deal of difficulty voiding, however, he has no evidence of obstructive  uropathy other than the rigidity of his tissues.         Vic Ordonez MD    D: 06/28/2021 15:46:09       T: 06/28/2021 15:49:31     [email or web address]  Job#: [id number]     Doc#: [id number]    CC:

## 2021-06-29 NOTE — PROGRESS NOTES
6/29/2021 10:21 AM  Service: Urology  Group: [facility] urology (James/Donny/Can)    Shan Finnegan  [id number]    Subjective:    Doing well this morning  Estrada catheter draining yellow urine  No pain or discomfort  Hopes to go home today  No family present    Review of Systems  Constitutional: No fever or chills   Respiratory: negative for cough and hemoptysis  Cardiovascular: negative for chest pain and dyspnea  Gastrointestinal: negative for abdominal pain, diarrhea, nausea and vomiting   : See above  Derm: negative for rash and skin lesion(s)  Neurological: negative for seizures and tremors  Musculoskeletal: Negative    Psychiatric: Negative   All other reviews are negative      Scheduled Meds:   ferric gluconate (FERRLECIT) IVPB  125 mg Intravenous Daily    atorvastatin  40 mg Oral Nightly    metoprolol tartrate  12.5 mg Oral BID    sodium chloride flush  5-40 mL Intravenous 2 times per day       Objective:  Vitals:    06/29/21 0438   BP: (!) 123/58   Pulse: 79   Resp: 17   Temp: 98.7 °F (37.1 °C)   SpO2: 96%         Allergies: Cipro [ciprofloxacin hcl]    General Appearance: alert and oriented to person, place and time and in no acute distress  Skin: no rash or erythema  Head: normocephalic and atraumatic  Pulmonary/Chest: normal air movement, no respiratory distress  Abdomen: soft, non-tender, non-distended  Genitourinary:  Estrada catheter draining yellow urine  Extremities: no cyanosis, clubbing or edema         Labs:     Recent Labs     06/29/21  0513      K 4.3      CO2 22   BUN 22   CREATININE 1.0   GLUCOSE 98   CALCIUM 8.2*       Lab Results   Component Value Date    HGB 7.6 06/29/2021    HCT 23.3 06/29/2021         Assessment/Plan:  POD #1 cystoscopy, retrograde pyelogram, evacuation of clots, fulguration of bladder      Creatinine stable  Hemoglobin stable  Antibiotics per primary   Stop CBI  Continue the Estrada catheter  He will be discharged home with a Estrada catheter with plans to follow-up next week for Estrada catheter removal, has scheduled appointment on Tuesday of next week  No further  interventions planned at this time  Please call with additional questions or concerns    ARGENTINA Cordova - CNP   [facility]  Urology

## 2021-06-29 NOTE — PROGRESS NOTES
CLINICAL PHARMACY NOTE: MEDS TO BEDS    Total # of Prescriptions Filled: 1   The following medications were delivered to the patient:  · PERCOCET 5/325 MG     Additional Documentation:  FERROUS 325MG- OTC NOT COVERED BY INSURANCE, PT AWARE.

## 2021-06-29 NOTE — DISCHARGE SUMMARY
Physician Discharge Summary     Patient ID:  Arnol Mclean  [id number]  89 y.o.  1945    Admit date: 6/25/2021    Discharge date and time: 6/29/2021    Admitting Physician: Katie Mcmahon DO     Discharge Physician: Nicole Savage    Admission Diagnoses: MAYRA (acute kidney injury) [facility]) [N17.9]    Discharge Diagnoses:   1. Hematuria  2. Hemorrhagic Cystitis  3. Acute kidney Injury  4. Hyperlipidemia  5. Acute blood loss anemia due to #1  6. Hypertension Essential  7. Bilateral hydroureter with bilateral Hydronephrosis     Admission Condition: fair    Discharged Condition: good    Indication for Admission:     Hospital Course:   Mr. Jose Francisco Grant was admitted with hematuria. Further evaluation showed kidney injury. There was concern for UTI and he was started on antibiotics. He was treated with IV abx for 3 days. Urine culture came back negative. Abx was discontinued on the 3rd day. He had anemia, this was due to hematuria. He received 1 unit of PRBC and his hgb remained at 7.6. He had cystoscopy with evacuation of hematoma. Finding noted in urology's procedure note. He also received 1 bag of IV iron and iron tablets was sent to patient's pharmacy. On the day of discharge, there was no hematuria in the irrigation bag. He was discharged in stable condition. His renal function returned to baseline after treatment with IV fluid and evacuation of hematoma. Time spent in discharge of patient is greater than 30 minutes. Medication List        START taking these medications      ferrous sulfate 325 (65 Fe) MG EC tablet  Commonly known as: Fe Tabs  Take 1 tablet by mouth 2 times daily     oxyCODONE-acetaminophen 5-325 MG per tablet  Commonly known as: PERCOCET  Take 1 tablet by mouth every 8 hours as needed for Pain for up to 3 days.             CONTINUE taking these medications      aspirin 81 MG EC tablet  Take 1 tablet by mouth daily     atorvastatin 40 MG tablet  Commonly known as: LIPITOR  Take 1 tablet by mouth nightly     clopidogrel 75 MG tablet  Commonly known as: PLAVIX  Take 1 tablet by mouth daily     docusate sodium 100 MG capsule  Commonly known as: COLACE  Take 1 capsule by mouth 2 times daily as needed for Constipation (constipation)     magnesium hydroxide 400 MG/5ML suspension  Commonly known as: MILK OF MAGNESIA     metoprolol tartrate 25 MG tablet  Commonly known as: LOPRESSOR  Take 0.5 tablets by mouth 2 times daily     vitamin D3 125 MCG (5000 UT) Caps               Where to Get Your Medications        These medications were sent to [facility] [place], [place] - [address]  [address] [place] [address]      Phone: [phone]   ferrous sulfate 325 (65 Fe) MG EC tablet       You can get these medications from any pharmacy    Bring a paper prescription for each of these medications  oxyCODONE-acetaminophen 5-325 MG per tablet         Consults: urology    Significant Diagnostic Studies: labs: CBC    Outstanding Order Results       Date and Time Order Name Status Description    6/28/2021 10:00 AM PREPARE RBC (CROSSMATCH), 1 Units Preliminary     6/26/2021 12:33 AM Culture, Urine Preliminary             Treatments: IV hydration and antibiotics: ceftriaxone    Discharge Exam:  BP (!) 123/58   Pulse 79   Temp 98.7 °F (37.1 °C) (Oral)   Resp 17   Ht 5' 10\" (1.778 m)   Wt 194 lb 1.6 oz (88 kg)   SpO2 96%   BMI 27.85 kg/m²   General appearance: alert, appears stated age and cooperative  Head: Normocephalic, without obvious abnormality, atraumatic  Eyes: conjunctivae/corneas clear. PERRL, EOM's intact. Fundi benign. Ears: normal TM's and external ear canals both ears  Nose: Nares normal. Septum midline. Mucosa normal. No drainage or sinus tenderness.   Throat: lips, mucosa, and tongue normal; teeth and gums normal  Neck: no adenopathy, no carotid bruit, no JVD, supple, symmetrical, trachea midline and thyroid not enlarged, symmetric, no tenderness/mass/nodules  Back: symmetric, no curvature. ROM normal. No CVA tenderness. Lungs: clear to auscultation bilaterally  Heart: regular rate and rhythm, S1, S2 normal, no murmur, click, rub or gallop  Abdomen: soft, non-tender; bowel sounds normal; no masses,  no organomegaly  Extremities: extremities normal, atraumatic, no cyanosis or edema  Pulses: 2+ and symmetric    Disposition: home    Patient Instructions:   [unfilled]  Activity: activity as tolerated  Diet: cardiac diet  Wound Care: none needed    Follow-up with PCP and Urology in 4 weeks.     Signed:  Jolynn Valle MD  6/29/2021  7:45 AM

## 2021-06-29 NOTE — CARE COORDINATION
CM note: Met with patient for transition of care planning. Reviewed with patient the intent to discharge home with urinary catheter. Pt states he has had one at home before and can care for it on his own. Pt lives with his spouse, is independent with ADLs and does not use any DME. PCP is Dr. De Bauman at the [facility], he does not want the [place] notified of admission. Plan at this time is to return home with spouse, patient denies any discharge needs.

## 2021-06-29 NOTE — ANESTHESIA POSTPROCEDURE EVALUATION
Department of Anesthesiology  Postprocedure Note    Patient: Chelo Simmons  MRN: [id number]  YOB: 1945  Date of evaluation: 6/28/2021  Time:  8:23 PM     Procedure Summary     Date: 06/28/21 Room / Location: [facility]  [address] [facility]    Anesthesia Start: 8207 Anesthesia Stop: 2015    Procedure: CYSTOSCOPY RETROGRADE PYELOGRAM, CLOT EVACUATION (N/A Bladder) Diagnosis: (GROSS HEMATURIA)    Surgeons: Katelynn Britt MD Responsible Provider: Efrem Starr MD    Anesthesia Type: MAC ASA Status: 3          Anesthesia Type: MAC    Juan Antonio Phase I: Juan Antonio Score: 10    Juan Antonio Phase II:      Last vitals: Reviewed and per EMR flowsheets.        Anesthesia Post Evaluation    Patient location during evaluation: PACU  Patient participation: complete - patient participated  Level of consciousness: awake  Airway patency: patent  Nausea & Vomiting: no nausea and no vomiting  Complications: no  Cardiovascular status: hemodynamically stable  Respiratory status: acceptable  Hydration status: euvolemic

## 2021-06-29 NOTE — CARE COORDINATION
Reviewed and explained BPCI-A letter. Patient verbalized understanding. [address] [facility] assistant.  Electronically signed by Duke Hernandez on 6/29/2021 at 11:55 AM

## 2022-06-22 ENCOUNTER — HOSPITAL ENCOUNTER (EMERGENCY)
Age: 77
Discharge: HOME | End: 2022-06-22
Payer: MEDICARE

## 2022-06-22 VITALS
SYSTOLIC BLOOD PRESSURE: 131 MMHG | OXYGEN SATURATION: 96 % | RESPIRATION RATE: 18 BRPM | HEART RATE: 65 BPM | TEMPERATURE: 98.9 F | DIASTOLIC BLOOD PRESSURE: 62 MMHG

## 2022-06-22 DIAGNOSIS — H01.111: Primary | ICD-10-CM

## 2022-06-22 DIAGNOSIS — H01.112: Primary | ICD-10-CM

## 2022-06-22 DIAGNOSIS — S05.00XA CORNEAL ABRASION, UNSPECIFIED LATERALITY, INITIAL ENCOUNTER: ICD-10-CM

## 2022-06-22 PROCEDURE — 6370000000 HC RX 637 (ALT 250 FOR IP): Performed by: PHYSICIAN ASSISTANT

## 2022-06-22 PROCEDURE — 99283 EMERGENCY DEPT VISIT LOW MDM: CPT

## 2022-06-22 RX ORDER — ERYTHROMYCIN 5 MG/G
OINTMENT OPHTHALMIC ONCE
Status: COMPLETED | OUTPATIENT
Start: 2022-06-22 | End: 2022-06-22

## 2022-06-22 RX ORDER — PREDNISONE 10 MG/1
20 TABLET ORAL ONCE
Status: COMPLETED | OUTPATIENT
Start: 2022-06-22 | End: 2022-06-22

## 2022-06-22 RX ORDER — ERYTHROMYCIN 5 MG/G
OINTMENT OPHTHALMIC
Qty: 3.5 G | Refills: 0 | Status: SHIPPED | OUTPATIENT
Start: 2022-06-22 | End: 2022-07-02

## 2022-06-22 RX ORDER — TETRACAINE HYDROCHLORIDE 5 MG/ML
2 SOLUTION OPHTHALMIC ONCE
Status: COMPLETED | OUTPATIENT
Start: 2022-06-22 | End: 2022-06-22

## 2022-06-22 RX ORDER — PREDNISONE 20 MG/1
TABLET ORAL
Qty: 18 TABLET | Refills: 0 | Status: SHIPPED | OUTPATIENT
Start: 2022-06-22 | End: 2022-07-02

## 2022-06-22 RX ADMIN — TETRACAINE HYDROCHLORIDE 2 DROP: 5 SOLUTION OPHTHALMIC at 18:46

## 2022-06-22 RX ADMIN — FLUORESCEIN SODIUM 1 EACH: 0.6 STRIP OPHTHALMIC at 18:45

## 2022-06-22 RX ADMIN — PREDNISONE 20 MG: 10 TABLET ORAL at 18:44

## 2022-06-22 RX ADMIN — ERYTHROMYCIN: 5 OINTMENT OPHTHALMIC at 18:46

## 2022-06-22 ASSESSMENT — PAIN - FUNCTIONAL ASSESSMENT
PAIN_FUNCTIONAL_ASSESSMENT: NONE - DENIES PAIN

## 2022-06-22 NOTE — ED PROVIDER NOTES
[address]  Department of Emergency Medicine   ED  Encounter Note  Admit Date/RoomTime: 2022  5:33 PM  ED Room: [place] [address]    NAME: Jona Rosario  :   MRN: [id number]     Chief Complaint:  Other (went to urgent care and was sent to ER due to poision ivy/poison oak around right eye)    History of Present Illness       Jona Rosario is a 78 y.o. old male who presents to the emergency department by private vehicle, for sudden onset of itchy, red and raised area on right eye which began 3 day(s) prior to arrival.  The symptoms were caused by exposure to poison ivy while outdoors. Since onset the symptoms have been gradually worsening. Patient reports that he was seen in urgent care this morning with same complaint and they directed him to the ER for further evaluation. Patient has not taken anything prior to arrival.  Prior history of similar episodes: Yes, patient reports he has had poison ivy in the past but never in his eye. Kerrie Graven His symptoms are associated with nothing additional and relieved by nothing. He denies any chest pain, shortness of breath, fever, chills, nausea, vomiting, diarrhea, blurred vision, eye pain, dizziness or headache. .    ROS   Pertinent positives and negatives are stated within HPI, all other systems reviewed and are negative. Past Medical History:  has a past medical history of CAD (coronary artery disease), HTN (hypertension), Hyperlipidemia, NSTEMI (non-ST elevated myocardial infarction) ([facility] [address] Rectal cancer ([facility] [address] and Tobacco abuse, in remission. Surgical History:  has a past surgical history that includes Coronary artery bypass graft (N/A, 12/10/2020); Rectal surgery; Hemorrhoid surgery; Inguinal hernia repair (Left); Colonoscopy; TURP (N/A, 2021); and Bladder surgery (N/A, 2021). Social History:  reports that he quit smoking about 22 years ago. His smoking use included cigarettes.  He has a 33.00 pack-year smoking history. He has never used smokeless tobacco. He reports current alcohol use of about 2.0 standard drinks of alcohol per week. He reports that he does not use drugs. Family History: family history is not on file. Allergies: Cipro [ciprofloxacin hcl]    Physical Exam   Oxygen Saturation Interpretation: Normal.        ED Triage Vitals   BP Temp Temp Source Heart Rate Resp SpO2 Height Weight   06/22/22 1532 06/22/22 1443 06/22/22 1443 06/22/22 1443 06/22/22 1532 06/22/22 1443 -- --   (!) 157/68 98.9 °F (37.2 °C) Oral 66 18 98 %           Constitutional:  Alert, development consistent with age. HEENT:  NC/NT. Airway patent. Eyes:  PERRL, EOMI bilaterally. , no discharge noted from left eye. Clear drainage noted from right eye. Conjunctival injection noted within right eye on visual inspection. No tenderness to palpation surrounding periorbital area however the patient does verbalize pruritus with palpation. .   Ears:  TMs without perforation, injection, or bulging. External canals clear without exudate. Mouth:  Mucous membranes moist without lesions, tongue and gums normal.  Throat:  Pharynx without injection, exudate, or tonsillar hypertrophy. Airway patient. Neck:  Supple. No lymphadenopathy. Respiratory:  Clear to auscultation and breath sounds equal.  CV:  Regular rate and rhythm. GI:  Abdomen Soft, nontender, +BS. Integument:  Skin turgor: Normal.              Area of erythema, mild edema, noted over right facial cheek extending up into upper and lower right eyelid without active bleeding, active drainage or signs of infection or foreign body. .  Neurological:  Orientation age-appropriate unless noted elseware. Motor functions intact. Lab / Imaging Results   (All laboratory and radiology results have been personally reviewed by myself)  Labs:  No results found for this visit on 06/22/22. Imaging: All Radiology results interpreted by Radiologist unless otherwise noted.   No orders to display       ED Course / Medical Decision Making     Medications   fluorescein ophthalmic strip 1 each (has no administration in time range)   tetracaine (TETRAVISC) 0.5 % ophthalmic solution 2 drop (has no administration in time range)   erythromycin (ROMYCIN) ophthalmic ointment (has no administration in time range)   predniSONE (DELTASONE) tablet 20 mg (has no administration in time range)        Consults:   None    Procedures:   Procedure: Fluorescein eye stain  Resection as benefits were discussed with patient prior to initiation. Patient gave verbal consent. Patient's eye was dyed with tetracaine    Vera Kobs LAMP EXAM:  Performed By: EDUARDO Phoenix. Right Eye: Cornea: an abrasion is present which is approximately 3 mm at the 6 o'clock position. Flourescein stain: Positive. Anterior chamber: normal.         MDM:   Patient's eye was fluorescein eye stain due to the conjunctival injection noted on visual inspection. Fluorescein eye stain did demonstrate an abrasion at the 6 o'clock position that was without active bleeding, active drainage or obvious foreign body. Patient was given first dose of erythromycin ointment with an ED setting and educated on how to utilize it. Prescription for continued erythromycin ointment was sent to pharmacy of choice which is to be utilized as directed in its entirety for treatment. Patient was also given first dose of steroids well with an ED setting today due to the fact that topical steroids are not appropriate for her on the face and the patient does have significant contact dermatitis due to poison ivy surrounding the right eye. Risk versus benefits were discussed including increase in blood pressure as the patient does take hypertensive medication and the importance of limiting salt intake for the next several days. Patient did verbalize that he has had poison ivy in the past and tolerated steroids well.   Patient was given first dose with an ED setting which she tolerated well. Prescription was sent to the patient's pharmacy of choice for continued regimen to be utilized as directed in its entirety. Patient appropriate for discharged home as he is alert and oriented, no acute distress and afebrile. Patient is recommended to follow-up with his primary care provider in the next following days for ED follow-up appointment or to represent to ER with new or worsening symptoms. Patient states he is both understandable and agreeable to this plan. Plan of Care/Counseling:  EDUARDO Toure reviewed today's visit with the patient and spouse / life partner in addition to providing specific details for the plan of care and counseling regarding the diagnosis and prognosis. Questions are answered at this time and are agreeable with the plan. Assessment      1. Contact dermatitis of right upper and lower eyelids    2. Corneal abrasion, unspecified laterality, initial encounter      Plan   Discharged home. Patient condition is good    New Medications     New Prescriptions    ERYTHROMYCIN (ROMYCIN) 5 MG/GM OPHTHALMIC OINTMENT    Apply thin layer oint to affected eye Nightly. PREDNISONE (DELTASONE) 20 MG TABLET    Take 1 tablet by mouth twice daily for 7 days. Then take 1 tablet by mouth once daily for 7 days. Electronically signed by EDUARDO Toure   DD: 6/22/22  **This report was transcribed using voice recognition software. Every effort was made to ensure accuracy; however, inadvertent computerized transcription errors may be present.   END OF ED PROVIDER NOTE       EDUARDO Toure  06/22/22 [address] [place], [place]  06/22/22 9280

## 2022-06-22 NOTE — ED NOTES
Department of Emergency Medicine  FIRST PROVIDER TRIAGE NOTE             Independent MLP           6/22/22  3:34 PM EDT    Date of Encounter: 6/22/22   MRN: [id number]      HPI: Messi Dailey is a 78 y.o. male who presents to the ED for Other (went to urgent care and was sent to ER due to poision ivy around eye)  Patient states that he went to urgent care earlier today for evaluation of right eye swelling and itching for the past 3 days due to poison ivy exposure. He states that he was recommended to come to the ER due to the severity of swelling and eye redness. Patient states he has not utilized anything over-the-counter or prescriptive to alleviate his symptoms thus far. Patient denies any blurred vision or pain in the area or additional symptoms. Patient does not wear contacts. ROS: Negative for cp, sob, abd pain, back pain, fever, cough, vomiting, diarrhea, urinary complaints, headache or dizziness. PE: Gen Appearance/Constitutional: alert  CV: regular rate  Pulm: CTA bilat  GI: soft and NT     Initial Plan of Care: All treatment areas with department are currently occupied. Plan to order/Initiate the following while awaiting opening in ED: Fluorescein eye stain, medication.   Initiate Treatment-Testing, Proceed toTreatment Area When Bed Available for ED Attending/MLP to Continue Care    Electronically signed by EDUARDO Ledbetter   DD: 6/22/22         Duglas Ledbetter  06/22/22 5904

## 2022-07-17 ENCOUNTER — HOSPITAL ENCOUNTER (INPATIENT)
Age: 77
LOS: 3 days | Discharge: HOME | DRG: 690 | End: 2022-07-21
Attending: EMERGENCY MEDICINE | Admitting: INTERNAL MEDICINE
Payer: MEDICARE

## 2022-07-17 DIAGNOSIS — N28.9 RENAL INSUFFICIENCY: ICD-10-CM

## 2022-07-17 DIAGNOSIS — N30.91 HEMORRHAGIC CYSTITIS: ICD-10-CM

## 2022-07-17 DIAGNOSIS — R33.8 ACUTE URINARY RETENTION: Primary | ICD-10-CM

## 2022-07-17 LAB
ANION GAP SERPL CALCULATED.3IONS-SCNC: 12 MMOL/L (ref 7–16)
BACTERIA: ABNORMAL /HPF
BILIRUBIN URINE: NEGATIVE
BLOOD, URINE: ABNORMAL
BUN BLDV-MCNC: 19 MG/DL (ref 6–23)
CALCIUM SERPL-MCNC: 9 MG/DL (ref 8.6–10.2)
CHLORIDE BLD-SCNC: 106 MMOL/L (ref 98–107)
CLARITY: ABNORMAL
CO2: 22 MMOL/L (ref 22–29)
COLOR: ABNORMAL
CREAT SERPL-MCNC: 1.3 MG/DL (ref 0.7–1.2)
EPITHELIAL CELLS, UA: ABNORMAL /HPF
GFR AFRICAN AMERICAN: >60
GFR NON-AFRICAN AMERICAN: 54 ML/MIN/1.73
GLUCOSE BLD-MCNC: 176 MG/DL (ref 74–99)
GLUCOSE URINE: NEGATIVE MG/DL
KETONES, URINE: NEGATIVE MG/DL
LEUKOCYTE ESTERASE, URINE: ABNORMAL
NITRITE, URINE: NEGATIVE
PH UA: 6.5 (ref 5–9)
POTASSIUM REFLEX MAGNESIUM: 3.8 MMOL/L (ref 3.5–5)
PROTEIN UA: >=300 MG/DL
RBC UA: ABNORMAL /HPF (ref 0–2)
REASON FOR REJECTION: NORMAL
REJECTED TEST: NORMAL
SODIUM BLD-SCNC: 140 MMOL/L (ref 132–146)
SPECIFIC GRAVITY UA: 1.02 (ref 1–1.03)
UROBILINOGEN, URINE: 0.2 E.U./DL
WBC UA: ABNORMAL /HPF (ref 0–5)

## 2022-07-17 PROCEDURE — 96361 HYDRATE IV INFUSION ADD-ON: CPT

## 2022-07-17 PROCEDURE — 80048 BASIC METABOLIC PNL TOTAL CA: CPT

## 2022-07-17 PROCEDURE — 81001 URINALYSIS AUTO W/SCOPE: CPT

## 2022-07-17 PROCEDURE — 85025 COMPLETE CBC W/AUTO DIFF WBC: CPT

## 2022-07-17 PROCEDURE — 6360000002 HC RX W HCPCS: Performed by: EMERGENCY MEDICINE

## 2022-07-17 PROCEDURE — 99285 EMERGENCY DEPT VISIT HI MDM: CPT

## 2022-07-17 PROCEDURE — 2580000003 HC RX 258: Performed by: EMERGENCY MEDICINE

## 2022-07-17 PROCEDURE — 87088 URINE BACTERIA CULTURE: CPT

## 2022-07-17 PROCEDURE — 36415 COLL VENOUS BLD VENIPUNCTURE: CPT

## 2022-07-17 PROCEDURE — 96374 THER/PROPH/DIAG INJ IV PUSH: CPT

## 2022-07-17 RX ORDER — FENTANYL CITRATE 0.05 MG/ML
50 INJECTION, SOLUTION INTRAMUSCULAR; INTRAVENOUS ONCE
Status: COMPLETED | OUTPATIENT
Start: 2022-07-17 | End: 2022-07-17

## 2022-07-17 RX ORDER — 0.9 % SODIUM CHLORIDE 0.9 %
1000 INTRAVENOUS SOLUTION INTRAVENOUS ONCE
Status: COMPLETED | OUTPATIENT
Start: 2022-07-17 | End: 2022-07-17

## 2022-07-17 RX ADMIN — FENTANYL CITRATE 50 MCG: 0.05 INJECTION, SOLUTION INTRAMUSCULAR; INTRAVENOUS at 21:58

## 2022-07-17 RX ADMIN — SODIUM CHLORIDE 1000 ML: 9 INJECTION, SOLUTION INTRAVENOUS at 20:24

## 2022-07-17 ASSESSMENT — ENCOUNTER SYMPTOMS
VOMITING: 0
SHORTNESS OF BREATH: 0
ABDOMINAL PAIN: 1
ABDOMINAL DISTENTION: 1
COUGH: 0
BACK PAIN: 0
COLOR CHANGE: 0
NAUSEA: 1
DIARRHEA: 1
BLOOD IN STOOL: 0

## 2022-07-17 ASSESSMENT — PAIN DESCRIPTION - DESCRIPTORS
DESCRIPTORS: PRESSURE
DESCRIPTORS: PRESSURE

## 2022-07-17 ASSESSMENT — PAIN DESCRIPTION - LOCATION
LOCATION: PENIS
LOCATION: PENIS

## 2022-07-17 ASSESSMENT — PAIN SCALES - GENERAL: PAINLEVEL_OUTOF10: 8

## 2022-07-17 ASSESSMENT — PAIN - FUNCTIONAL ASSESSMENT: PAIN_FUNCTIONAL_ASSESSMENT: 0-10

## 2022-07-17 ASSESSMENT — PAIN DESCRIPTION - PAIN TYPE: TYPE: ACUTE PAIN

## 2022-07-17 NOTE — ED PROVIDER NOTES
Patient presents to the ED for evaluation of urinary retention. Patient states that he has had difficulty urinating for the past 4 days. He is also been having blood in the urine. Today the urinary retention got much worse. He is complaining of lots of abdominal pain which he describes as a pressure in his lower abdomen. Prior history of TURP back in 21. Does follow with local urology. States he was in a call them tomorrow. Pain is moderate to severe in severity in his lower abdomen. He was given fentanyl by EMS which she states only helped a little bit. Mild nausea associated with the pain. No back pain. No fever or chills. No chest pain or shortness of breath. Patient is on aspirin and Plavix but no anticoagulants. Review of Systems   Constitutional:  Positive for diaphoresis. Negative for chills, fatigue and fever. Respiratory:  Negative for cough and shortness of breath. Cardiovascular:  Negative for chest pain and palpitations. Gastrointestinal:  Positive for abdominal distention, abdominal pain, diarrhea and nausea. Negative for blood in stool and vomiting. Genitourinary:  Positive for decreased urine volume, difficulty urinating and hematuria. Negative for dysuria, flank pain and urgency. Musculoskeletal:  Negative for back pain and myalgias. Skin:  Negative for color change and pallor. Neurological:  Negative for dizziness and light-headedness. Hematological:  Does not bruise/bleed easily. All other systems reviewed and are negative. Physical Exam  Vitals and nursing note reviewed. Constitutional:       General: He is not in acute distress. Appearance: He is well-developed. He is diaphoretic. Comments: Appears uncomfortable but is in no distress. HENT:      Head: Normocephalic and atraumatic. Eyes:      Conjunctiva/sclera: Conjunctivae normal.   Cardiovascular:      Rate and Rhythm: Normal rate and regular rhythm.       Heart sounds: Normal heart sounds. No murmur heard. Pulmonary:      Effort: Pulmonary effort is normal. No respiratory distress. Breath sounds: Normal breath sounds. No wheezing or rales. Abdominal:      General: Bowel sounds are normal. There is distension (Suprapubic fullness to palpation). Palpations: Abdomen is soft. Tenderness: There is abdominal tenderness (Suprapubic tenderness to palpation. ). There is no right CVA tenderness, left CVA tenderness, guarding or rebound. Musculoskeletal:      Cervical back: Normal range of motion and neck supple. Skin:     General: Skin is warm. Coloration: Skin is not pale. Neurological:      Mental Status: He is alert and oriented to person, place, and time. Procedures     [facility]       2100. I have assumed care of this patient from the departing physician, Dr. Shaista Zarate. I have discussed the initial history and exam, diagnostics and treatment plan. I have introduced myself to the patient and answered all questions to this point. ED Course as of 07/18/22 0227   Marlea Leyden Jul 17, 2022 2015 Nurse was able to place a 18 [employer] three-way Estrada catheter. Lots of large clots came out. She is not running continuous irrigation. Fluid coming out of the catheter is very light pink tinged fluid. Patient feels much better after the catheter was placed. Only reports mild abdominal pressure now. [MS]   2052 Nurse reports that with the initial insertion there was 600 cc of bloody urine that came out. Patient resting in bed at this time. No complaints. Feels better. Awaiting lab results. Patient's wife is at the bedside who reports that he has had to have prior cauterization of a site of bleeding in his bladder before when he had hematuria. This was done by Dr. Eddy Quiroz. Patient states he follows with Dr. America Villafana. [MS]   2100 Signing out patient to [address] [place] [address]. Patient's case discussed.   Treatment plan as well as current test results reviewed together.   [MS]      ED Course User Index  [MS] Chyna Moraes, DO       --------------------------------------------- PAST HISTORY ---------------------------------------------  Past Medical History:  has a past medical history of CAD (coronary artery disease), HTN (hypertension), Hyperlipidemia, NSTEMI (non-ST elevated myocardial infarction) ([facility] [facility] ([facility] and Tobacco abuse, in remission. Past Surgical History:  has a past surgical history that includes Coronary artery bypass graft (N/A, 12/10/2020); Rectal surgery; Hemorrhoid surgery; Inguinal hernia repair (Left); Colonoscopy; TURP (N/A, 2/12/2021); and Bladder surgery (N/A, 6/28/2021). Social History:  reports that he quit smoking about 22 years ago. His smoking use included cigarettes. He has a 33.00 pack-year smoking history. He has never used smokeless tobacco. He reports current alcohol use of about 2.0 standard drinks per week. He reports that he does not use drugs. Family History: family history is not on file. The patients home medications have been reviewed.     Allergies: Cipro [ciprofloxacin hcl]    -------------------------------------------------- RESULTS -------------------------------------------------    LABS:  Results for orders placed or performed during the hospital encounter of 07/17/22   Urinalysis with Microscopic   Result Value Ref Range    Color, UA RED (A) Straw/Yellow    Clarity, UA TURBID (A) Clear    Glucose, Ur Negative Negative mg/dL    Bilirubin Urine Negative Negative    Ketones, Urine Negative Negative mg/dL    Specific Gravity, UA 1.025 1.005 - 1.030    Blood, Urine LARGE (A) Negative    pH, UA 6.5 5.0 - 9.0    Protein, UA >=300 (A) Negative mg/dL    Urobilinogen, Urine 0.2 <2.0 E.U./dL    Nitrite, Urine Negative Negative    Leukocyte Esterase, Urine TRACE (A) Negative    WBC, UA 5-10 (A) 0 - 5 /HPF    RBC, UA PACKED 0 - 2 /HPF    Epithelial Cells, UA NONE SEEN /HPF    Bacteria, UA RARE (A) None Seen /HPF   Basic Metabolic Panel w/ Reflex to MG   Result Value Ref Range    Sodium 140 132 - 146 mmol/L    Potassium reflex Magnesium 3.8 3.5 - 5.0 mmol/L    Chloride 106 98 - 107 mmol/L    CO2 22 22 - 29 mmol/L    Anion Gap 12 7 - 16 mmol/L    Glucose 176 (H) 74 - 99 mg/dL    BUN 19 6 - 23 mg/dL    CREATININE 1.3 (H) 0.7 - 1.2 mg/dL    GFR Non-African American 54 >=60 mL/min/1.73    GFR African American >60     Calcium 9.0 8.6 - 10.2 mg/dL   SPECIMEN REJECTION   Result Value Ref Range    Rejected Test cbcwd     Reason for Rejection see below    CBC with Auto Differential   Result Value Ref Range    WBC 13.5 (H) 4.5 - 11.5 E9/L    RBC 3.35 (L) 3.80 - 5.80 E12/L    Hemoglobin 10.4 (L) 12.5 - 16.5 g/dL    Hematocrit 31.4 (L) 37.0 - 54.0 %    MCV 93.7 80.0 - 99.9 fL    MCH 31.0 26.0 - 35.0 pg    MCHC 33.1 32.0 - 34.5 %    RDW 13.1 11.5 - 15.0 fL    Platelets 899 853 - 979 E9/L    MPV 9.9 7.0 - 12.0 fL    Neutrophils % 85.0 (H) 43.0 - 80.0 %    Immature Granulocytes % 0.4 0.0 - 5.0 %    Lymphocytes % 7.8 (L) 20.0 - 42.0 %    Monocytes % 6.4 2.0 - 12.0 %    Eosinophils % 0.1 0.0 - 6.0 %    Basophils % 0.3 0.0 - 2.0 %    Neutrophils Absolute 11.49 (H) 1.80 - 7.30 E9/L    Immature Granulocytes # 0.06 E9/L    Lymphocytes Absolute 1.06 (L) 1.50 - 4.00 E9/L    Monocytes Absolute 0.87 0.10 - 0.95 E9/L    Eosinophils Absolute 0.01 (L) 0.05 - 0.50 E9/L    Basophils Absolute 0.04 0.00 - 0.20 E9/L       RADIOLOGY:  No orders to display       ------------------------- NURSING NOTES AND VITALS REVIEWED ---------------------------  Date / Time Roomed:  7/17/2022  7:08 PM  ED Bed Assignment:  21/21    The nursing notes within the ED encounter and vital signs as below have been reviewed.      Patient Vitals for the past 24 hrs:   BP Temp Pulse Resp SpO2 Height Weight   07/18/22 0023 (!) 146/79 -- 91 16 98 % -- --   07/17/22 2359 (!) 142/82 -- (!) 105 18 99 % -- --   07/17/22 2205 (!) 153/94 -- (!) 115 18 97 % -- --   07/17/22 2105 (!) 142/73 -- 92 16 95 % -- -- 07/17/22 1930 (!) 164/96 99.7 °F (37.6 °C) (!) 109 20 95 % 5' 10\" (1.778 m) 230 lb (104.3 kg)       Oxygen Saturation Interpretation: Normal    ------------------------------------------ PROGRESS NOTES ------------------------------------------  Re-evaluation(s):  Time: 0130. Patients symptoms are improving  Repeat physical examination is improved    Counseling:  I have spoken with the patient and discussed todays results, in addition to providing specific details for the plan of care and counseling regarding the diagnosis and prognosis. Their questions are answered at this time and they are agreeable with the plan of admission.    --------------------------------- ADDITIONAL PROVIDER NOTES ---------------------------------  Consultations:  Time: 8495. Spoke with Dr. Chidi Lott.  Discussed case. They will admit the patient. This patient's ED course included: a personal history and physicial examination, re-evaluation prior to disposition, multiple bedside re-evaluations, and IV medications    This patient has remained hemodynamically stable during their ED course. Diagnosis:  1. Acute urinary retention    2. Hemorrhagic cystitis    3. Renal insufficiency        Disposition:  Patient's disposition: Admit to med/surg floor  Patient's condition is stable.          [address] [facility],   07/18/22 8709

## 2022-07-18 PROBLEM — R31.9 HEMATURIA: Status: ACTIVE | Noted: 2022-07-18

## 2022-07-18 LAB
BASOPHILS ABSOLUTE: 0.04 E9/L (ref 0–0.2)
BASOPHILS RELATIVE PERCENT: 0.3 % (ref 0–2)
EOSINOPHILS ABSOLUTE: 0.01 E9/L (ref 0.05–0.5)
EOSINOPHILS RELATIVE PERCENT: 0.1 % (ref 0–6)
HCT VFR BLD CALC: 31.4 % (ref 37–54)
HEMOGLOBIN: 10.4 G/DL (ref 12.5–16.5)
IMMATURE GRANULOCYTES #: 0.06 E9/L
IMMATURE GRANULOCYTES %: 0.4 % (ref 0–5)
LYMPHOCYTES ABSOLUTE: 1.06 E9/L (ref 1.5–4)
LYMPHOCYTES RELATIVE PERCENT: 7.8 % (ref 20–42)
MCH RBC QN AUTO: 31 PG (ref 26–35)
MCHC RBC AUTO-ENTMCNC: 33.1 % (ref 32–34.5)
MCV RBC AUTO: 93.7 FL (ref 80–99.9)
MONOCYTES ABSOLUTE: 0.87 E9/L (ref 0.1–0.95)
MONOCYTES RELATIVE PERCENT: 6.4 % (ref 2–12)
NEUTROPHILS ABSOLUTE: 11.49 E9/L (ref 1.8–7.3)
NEUTROPHILS RELATIVE PERCENT: 85 % (ref 43–80)
PDW BLD-RTO: 13.1 FL (ref 11.5–15)
PLATELET # BLD: 266 E9/L (ref 130–450)
PMV BLD AUTO: 9.9 FL (ref 7–12)
RBC # BLD: 3.35 E12/L (ref 3.8–5.8)
WBC # BLD: 13.5 E9/L (ref 4.5–11.5)

## 2022-07-18 PROCEDURE — 99223 1ST HOSP IP/OBS HIGH 75: CPT | Performed by: INTERNAL MEDICINE

## 2022-07-18 PROCEDURE — 6360000002 HC RX W HCPCS: Performed by: EMERGENCY MEDICINE

## 2022-07-18 PROCEDURE — 2580000003 HC RX 258: Performed by: NURSE PRACTITIONER

## 2022-07-18 PROCEDURE — 51702 INSERT TEMP BLADDER CATH: CPT

## 2022-07-18 PROCEDURE — 2580000003 HC RX 258: Performed by: EMERGENCY MEDICINE

## 2022-07-18 PROCEDURE — 1200000000 HC SEMI PRIVATE

## 2022-07-18 PROCEDURE — 6370000000 HC RX 637 (ALT 250 FOR IP): Performed by: INTERNAL MEDICINE

## 2022-07-18 PROCEDURE — 6360000002 HC RX W HCPCS: Performed by: NURSE PRACTITIONER

## 2022-07-18 RX ORDER — ATORVASTATIN CALCIUM 40 MG/1
40 TABLET, FILM COATED ORAL NIGHTLY
Status: DISCONTINUED | OUTPATIENT
Start: 2022-07-18 | End: 2022-07-21 | Stop reason: HOSPADM

## 2022-07-18 RX ORDER — CLOPIDOGREL BISULFATE 75 MG/1
75 TABLET ORAL DAILY
Status: DISCONTINUED | OUTPATIENT
Start: 2022-07-18 | End: 2022-07-21 | Stop reason: HOSPADM

## 2022-07-18 RX ORDER — POLYETHYLENE GLYCOL 3350 17 G/17G
17 POWDER, FOR SOLUTION ORAL DAILY PRN
Status: DISCONTINUED | OUTPATIENT
Start: 2022-07-18 | End: 2022-07-21 | Stop reason: HOSPADM

## 2022-07-18 RX ORDER — PROMETHAZINE HYDROCHLORIDE 25 MG/1
12.5 TABLET ORAL EVERY 6 HOURS PRN
Status: DISCONTINUED | OUTPATIENT
Start: 2022-07-18 | End: 2022-07-21 | Stop reason: HOSPADM

## 2022-07-18 RX ORDER — SODIUM CHLORIDE 9 MG/ML
INJECTION, SOLUTION INTRAVENOUS CONTINUOUS
Status: DISCONTINUED | OUTPATIENT
Start: 2022-07-18 | End: 2022-07-18 | Stop reason: DRUGHIGH

## 2022-07-18 RX ORDER — SODIUM CHLORIDE 9 MG/ML
INJECTION, SOLUTION INTRAVENOUS CONTINUOUS
Status: DISCONTINUED | OUTPATIENT
Start: 2022-07-18 | End: 2022-07-20

## 2022-07-18 RX ORDER — ACETAMINOPHEN 650 MG/1
650 SUPPOSITORY RECTAL EVERY 6 HOURS PRN
Status: DISCONTINUED | OUTPATIENT
Start: 2022-07-18 | End: 2022-07-21 | Stop reason: HOSPADM

## 2022-07-18 RX ORDER — MORPHINE SULFATE 2 MG/ML
2 INJECTION, SOLUTION INTRAMUSCULAR; INTRAVENOUS EVERY 4 HOURS PRN
Status: DISCONTINUED | OUTPATIENT
Start: 2022-07-18 | End: 2022-07-21 | Stop reason: HOSPADM

## 2022-07-18 RX ORDER — ONDANSETRON 2 MG/ML
4 INJECTION INTRAMUSCULAR; INTRAVENOUS EVERY 6 HOURS PRN
Status: DISCONTINUED | OUTPATIENT
Start: 2022-07-18 | End: 2022-07-21 | Stop reason: HOSPADM

## 2022-07-18 RX ORDER — ACETAMINOPHEN 325 MG/1
650 TABLET ORAL EVERY 6 HOURS PRN
Status: DISCONTINUED | OUTPATIENT
Start: 2022-07-18 | End: 2022-07-21 | Stop reason: HOSPADM

## 2022-07-18 RX ADMIN — METOPROLOL TARTRATE 12.5 MG: 25 TABLET, FILM COATED ORAL at 09:05

## 2022-07-18 RX ADMIN — CEFTRIAXONE SODIUM 1000 MG: 1 INJECTION, POWDER, FOR SOLUTION INTRAMUSCULAR; INTRAVENOUS at 07:03

## 2022-07-18 RX ADMIN — MORPHINE SULFATE 2 MG: 2 INJECTION, SOLUTION INTRAMUSCULAR; INTRAVENOUS at 10:59

## 2022-07-18 RX ADMIN — SODIUM CHLORIDE: 9 INJECTION, SOLUTION INTRAVENOUS at 09:00

## 2022-07-18 RX ADMIN — ATORVASTATIN CALCIUM 40 MG: 40 TABLET, FILM COATED ORAL at 20:50

## 2022-07-18 RX ADMIN — METOPROLOL TARTRATE 12.5 MG: 25 TABLET, FILM COATED ORAL at 20:50

## 2022-07-18 RX ADMIN — MORPHINE SULFATE 2 MG: 2 INJECTION, SOLUTION INTRAMUSCULAR; INTRAVENOUS at 22:45

## 2022-07-18 RX ADMIN — CHOLECALCIFEROL TAB 125 MCG (5000 UNIT) 5000 UNITS: 125 TAB at 09:06

## 2022-07-18 ASSESSMENT — PAIN DESCRIPTION - DESCRIPTORS
DESCRIPTORS: CRAMPING;ACHING;DISCOMFORT
DESCRIPTORS: PRESSURE

## 2022-07-18 ASSESSMENT — PAIN SCALES - GENERAL
PAINLEVEL_OUTOF10: 6
PAINLEVEL_OUTOF10: 3
PAINLEVEL_OUTOF10: 6
PAINLEVEL_OUTOF10: 6

## 2022-07-18 ASSESSMENT — PAIN DESCRIPTION - LOCATION
LOCATION: ABDOMEN
LOCATION: BACK

## 2022-07-18 ASSESSMENT — PAIN DESCRIPTION - PAIN TYPE: TYPE: ACUTE PAIN

## 2022-07-18 ASSESSMENT — PAIN - FUNCTIONAL ASSESSMENT
PAIN_FUNCTIONAL_ASSESSMENT: ACTIVITIES ARE NOT PREVENTED
PAIN_FUNCTIONAL_ASSESSMENT: 0-10
PAIN_FUNCTIONAL_ASSESSMENT: NONE - DENIES PAIN

## 2022-07-18 ASSESSMENT — LIFESTYLE VARIABLES: HOW OFTEN DO YOU HAVE A DRINK CONTAINING ALCOHOL: MONTHLY OR LESS

## 2022-07-18 ASSESSMENT — PAIN DESCRIPTION - ONSET: ONSET: GRADUAL

## 2022-07-18 ASSESSMENT — PAIN DESCRIPTION - FREQUENCY: FREQUENCY: INTERMITTENT

## 2022-07-18 NOTE — ED NOTES
CBI turned off and cheney clamped in order to retrieve urine specimen. Approximately 10cc retrieved from clamped cheney for urine specimen. CBI turned on and its noted the irrigation is backing up the CBI tubing. No urine draining in tubing. Patient extremely uncomfortable with pressure in his bladder. CBI turned off again; cheney hand irrigated with no return of irrigation after instilling 20-30cc. Patient extremely uncomfortable with irrigation of cheney. Dr Travis Burt made aware.      Teressa Linder RN  07/17/22 7014

## 2022-07-18 NOTE — H&P
[address] [place], [place] [address]ist Group   HISTORY AND PHYSICAL EXAM      AUTHOR: Verenice Trujillo MD PATIENT NAME: Ginny Diamond   DATE: 2022 MRN: [id number], : 1945   Primary Care Physician: Esha Pearl,      CHIEF COMPLAINT / REASON FOR ADMISSION:  Bloody urine, lower abdominal distension    HPI:   This is a 78 y.o. male  has a past medical history of CAD (coronary artery disease), HTN (hypertension), Hyperlipidemia, NSTEMI (non-ST elevated myocardial infarction) ([address] Rectal cancer ([address] and Tobacco abuse, in remission. presented with Bloody urine, lower abdominal distension  for last few days prior to arrival to the hospital.  He is having red rine with clots and now have difficulty in voiding with associated lower abdominal distension. Has h/o hemorrhagic cystitis in past but no bladder cancer or mass as per last cystoscopy. The patient was seen and examined at bedside, appears alert and awake with no acute distress and is able to answer simple  questions. On direct questioning, patient denied any  resting ongoing chest pain, resting SOB, hemoptysis, productive cough, fever, ongoing palpitation, active abdominal pain, hematemesis, rectal bleeding, neel, any other  and GI complaints, and any new focal neuro deficits .     ROS:  Pertinent positives and negatives are noted in the HPI, all other systems are reviewed and negative    PMH:  Past Medical History:   Diagnosis Date    CAD (coronary artery disease)     HTN (hypertension)     Hyperlipidemia     NSTEMI (non-ST elevated myocardial infarction) ([address]     Rectal cancer ([address]     Tobacco abuse, in remission        Surgical History:  Past Surgical History:   Procedure Laterality Date    BLADDER SURGERY N/A 2021    CYSTOSCOPY RETROGRADE PYELOGRAM, CLOT EVACUATION performed by Jazz Rodríguez MD at [facility]141 12/10/2020    CORONARY ARTERY BYPASS, BELINDA performed by Nancy Cazares DO Henrique at [address] Left     RECTAL SURGERY      TURP N/A 2/12/2021    CYSTOSCOPY RETROGRADE PYELOGRAM PLASMA LOOP TRANSURETHRAL RESECTION PROSTATE performed by Maria Guadalupe Ramirez DO at [facility] OR       Medications Prior to Admission:    Prior to Admission medications    Medication Sig Start Date End Date Taking? Authorizing Provider   metoprolol tartrate (LOPRESSOR) 25 MG tablet Take 0.5 tablets by mouth 2 times daily 7/23/21   Mckenzie Coleman MD   ferrous sulfate (FE TABS) 325 (65 Fe) MG EC tablet Take 1 tablet by mouth 2 times daily 6/29/21   Danita Fallon MD   Cholecalciferol (VITAMIN D3) 125 MCG (5000 UT) CAPS Take 5,000 Units by mouth daily    Historical Provider, MD   atorvastatin (LIPITOR) 40 MG tablet Take 1 tablet by mouth nightly 4/22/21   Eloisa Mao MD   clopidogrel (PLAVIX) 75 MG tablet Take 1 tablet by mouth daily 4/22/21   Eloisa Mao MD   magnesium hydroxide (MILK OF MAGNESIA) 400 MG/5ML suspension Take 5 mLs by mouth daily as needed for Constipation    Historical Provider, MD   aspirin 81 MG EC tablet Take 1 tablet by mouth daily 12/14/20   EDUARDO Shaikh   docusate sodium (COLACE) 100 MG capsule Take 1 capsule by mouth 2 times daily as needed for Constipation (constipation) 12/14/20   EDUARDO Shaikh       Allergies:    Cipro [ciprofloxacin hcl]    Social History:    reports that he quit smoking about 22 years ago. His smoking use included cigarettes. He has a 33.00 pack-year smoking history. He has never used smokeless tobacco. He reports current alcohol use of about 2.0 standard drinks per week. He reports that he does not use drugs. Family History:   family history is not on file.    Family History: Reviewed and no pertinent family history  PHYSICAL EXAM:  Vitals:  /77   Pulse 87   Temp 99.7 °F (37.6 °C)   Resp 16   Ht 5' 10\" (1.778 m)   Wt 230 lb (104.3 kg)   SpO2 98%   BMI 33.00 kg/m²   GENERAL: No acute distress, Alert and awake, Afebrile, Appears tired and weak otherwise hemodynamically stable at present. HEENT: PERRLA, no icterus. OP clear and no exudates. NECK: Supple  no carotid/ophthalmic bruits, JVD None. RESPIRATORY:  Bilateral equal vesicular breath sound with no wheezing. Lung bases are clear. HEART: No tachycardia at bedside and regular rhythm. Normal S1 and S2, No S3 or S4 is audible. No pulsation, thrills, murmur or friction rubs. ABDOMEN: Soft, nondistended, nontender. No hepatomegaly or splenomegaly. No CVA tenderness on the both sides. Bowel sound is present. Estrada is training bloody urine + continuous bladder irrigation. EXTREMITIES: All peripheral pulses are present. No calf tenderness or swelling. No pedal edema is present. Mark Anthony Gregory NEUROLOGY: Alert and awake. No new focal neuro deficit. Bilateral Pupil is equal and reactive to light. CN-ii-xii otherwise grossly intact. Motor and Sensory: Grossly Intact bilaterally with no new focal signs     LABS:  Recent Labs     07/17/22  2357   WBC 13.5*   RBC 3.35*   HGB 10.4*   HCT 31.4*   MCV 93.7   MCH 31.0   MCHC 33.1   RDW 13.1      MPV 9.9     Recent Labs     07/17/22  2026      K 3.8      CO2 22   BUN 19   CREATININE 1.3*   GLUCOSE 176*   CALCIUM 9.0     No results for input(s): POCGLU in the last 72 hours.   Results for orders placed or performed during the hospital encounter of 07/17/22   Urinalysis with Microscopic   Result Value Ref Range    Color, UA RED (A) Straw/Yellow    Clarity, UA TURBID (A) Clear    Glucose, Ur Negative Negative mg/dL    Bilirubin Urine Negative Negative    Ketones, Urine Negative Negative mg/dL    Specific Gravity, UA 1.025 1.005 - 1.030    Blood, Urine LARGE (A) Negative    pH, UA 6.5 5.0 - 9.0    Protein, UA >=300 (A) Negative mg/dL    Urobilinogen, Urine 0.2 <2.0 E.U./dL    Nitrite, Urine Negative Negative    Leukocyte Esterase, Urine TRACE (A) Negative    WBC, UA 5-10 (A) 0 - 5 /HPF    RBC, UA PACKED 0 - 2 /HPF    Epithelial Cells, UA NONE SEEN /HPF    Bacteria, UA RARE (A) None Seen /HPF   Basic Metabolic Panel w/ Reflex to MG   Result Value Ref Range    Sodium 140 132 - 146 mmol/L    Potassium reflex Magnesium 3.8 3.5 - 5.0 mmol/L    Chloride 106 98 - 107 mmol/L    CO2 22 22 - 29 mmol/L    Anion Gap 12 7 - 16 mmol/L    Glucose 176 (H) 74 - 99 mg/dL    BUN 19 6 - 23 mg/dL    CREATININE 1.3 (H) 0.7 - 1.2 mg/dL    GFR Non-African American 54 >=60 mL/min/1.73    GFR African American >60     Calcium 9.0 8.6 - 10.2 mg/dL   SPECIMEN REJECTION   Result Value Ref Range    Rejected Test cbcwd     Reason for Rejection see below    CBC with Auto Differential   Result Value Ref Range    WBC 13.5 (H) 4.5 - 11.5 E9/L    RBC 3.35 (L) 3.80 - 5.80 E12/L    Hemoglobin 10.4 (L) 12.5 - 16.5 g/dL    Hematocrit 31.4 (L) 37.0 - 54.0 %    MCV 93.7 80.0 - 99.9 fL    MCH 31.0 26.0 - 35.0 pg    MCHC 33.1 32.0 - 34.5 %    RDW 13.1 11.5 - 15.0 fL    Platelets 933 420 - 814 E9/L    MPV 9.9 7.0 - 12.0 fL    Neutrophils % 85.0 (H) 43.0 - 80.0 %    Immature Granulocytes % 0.4 0.0 - 5.0 %    Lymphocytes % 7.8 (L) 20.0 - 42.0 %    Monocytes % 6.4 2.0 - 12.0 %    Eosinophils % 0.1 0.0 - 6.0 %    Basophils % 0.3 0.0 - 2.0 %    Neutrophils Absolute 11.49 (H) 1.80 - 7.30 E9/L    Immature Granulocytes # 0.06 E9/L    Lymphocytes Absolute 1.06 (L) 1.50 - 4.00 E9/L    Monocytes Absolute 0.87 0.10 - 0.95 E9/L    Eosinophils Absolute 0.01 (L) 0.05 - 0.50 E9/L    Basophils Absolute 0.04 0.00 - 0.20 E9/L     ED Course as of 07/18/22 8613   Nabil Pagan Jul 17, 2022 2015 Nurse was able to place a 18 [facility] three-way Estrada catheter. Lots of large clots came out. She is not running continuous irrigation. Fluid coming out of the catheter is very light pink tinged fluid. Patient feels much better after the catheter was placed. Only reports mild abdominal pressure now.  [MS]   2052 Nurse reports that with the initial insertion there was 600 cc of bloody urine that came out. Patient resting in bed at this time. No complaints. Feels better. Awaiting lab results. Patient's wife is at the bedside who reports that he has had to have prior cauterization of a site of bleeding in his bladder before when he had hematuria. This was done by Dr. Janes Smith. Patient states he follows with Dr. Aida Crenshaw. [MS]   2100 Signing out patient to [address] [place] [address]. Patient's case discussed. Treatment plan as well as current test results reviewed together.   [MS]      ED Course User Index  [MS] Barry Spencer DO     Radiology: No results found. ASSESSMENT:    Present on Admission:   Hematuria    PLAN:  # Gross hematuria + Clots + Acute retention - possible UTI   H/o hemorrhagic cystitis  Estrada inserted and bladder irrigation on going   BUN/Cr stable   H&H stable   Urology consult  # H/O CAD and prior IHD   Hold plavix for now  Continue home meds  # Hypertension   Currently better controlled  Will resume home medications as needed. Monitor vitals and adjust BP meds as needed  # Rest of the chronic medical problems are stable and will be managed with appropriately with home medications, placed nursing communication order to verify home medications before giving them to the patient. # Diet: NPO Except Meds  # IVF's: Yes  # Fall Precaution: Yes  # Disposition: Home/primary residence   # Code Status: Full code by default  # DVT Prophylaxis : Mechanical compression devise  The patient at bedside was counseled about clinical status, laboratory/imaging results, diagnoses, medication side effects, risk, and treatment plan, all questions were answered to patient's satisfaction and verbalized understanding      SIGNATURE: Priti Wyman MD PATIENT NAME: Bria Florez   CONTACT #: Hospitalist on call MRN: [id number]     Disclaimer: Portions of this note may have been generated using Dragon voice recognition software.  Reasonable efforts were made to correct any dictation errors that resulted due to the programming of this software but some may still be present.

## 2022-07-18 NOTE — ED NOTES
Patient arrives unable to urinate. Verbal order for 3-way cheney with continuous irrigation received. #18 Welsh 3-way cheney placed with significant difficulty. Patient has multiple strictures making placing a cheney very difficult. Scant amount of cherry red urine finally returns. Cheney hand irrigated with numerous clots returned. CBI turned on; patient continues to complain of significant pressure and pain. Irrigation not draining out at near rate of infusion. CBI turned off and patient irrigated using piston syringe again. Large stringy clot returns as cheney begins to drain again. CBI turned on and irrigant returns ar good rate to equal infusion. Patient continues to complain of pressure and discomfort.      Riley Roach RN  07/17/22 3368

## 2022-07-18 NOTE — PLAN OF CARE
Problem: Discharge Planning  Goal: Discharge to home or other facility with appropriate resources  Outcome: Progressing Towards Goal     Problem: Safety - Adult  Goal: Free from fall injury  Outcome: Progressing Towards Goal     Problem: ABCDS Injury Assessment  Goal: Absence of physical injury  Outcome: Progressing Towards Goal

## 2022-07-18 NOTE — CONSULTS
7/18/2022 8:42 AM  Service: Urology  Group: [facility] ([facility])    Armando Carr  [id number]     Chief Complaint:    Clance Lamont Hematuria      History of Present Illness: The patient is a 78 y.o. male patient who presented to the hospital yesterday with complaints of difficulty urinating for 4 days and gross hematuria. He had a 18F 3 way cheney inserted in the ER for 600cc of immediate output. This is currently draining dark cherry colored urine with clots. He complains of bladder pressure. CBI is at a slow rate.    He is known to our practice and is patient of Dr. Asha Ramirez   He has a history of BPH with LUTS s/p TURP in 2021  He also has a history of gross hematuria/hemorrhagic cystitis s/p cysto with fulguration in 2021  He has a history of anal cancer s/p surgery and XRT in 2008  He does take Plavix  This has been placed on hold   He has been seen for some incomplete emptying int he past  Discussions on CIC were had during his last office appointment but this was placed on hold       Past Medical History:   Diagnosis Date    CAD (coronary artery disease)     HTN (hypertension)     Hyperlipidemia     NSTEMI (non-ST elevated myocardial infarction) ([facility]     Rectal cancer ([facility]     Tobacco abuse, in remission          Past Surgical History:   Procedure Laterality Date    BLADDER SURGERY N/A 6/28/2021    CYSTOSCOPY RETROGRADE PYELOGRAM, CLOT EVACUATION performed by Bebe Gutiérrez MD at [address] [place] [address] N/A 12/10/2020    CORONARY ARTERY BYPASS, BELINDA performed by Curtis Cardona DO at [address] Left     RECTAL SURGERY      TURP N/A 2/12/2021    CYSTOSCOPY RETROGRADE PYELOGRAM PLASMA LOOP TRANSURETHRAL RESECTION PROSTATE performed by Alden Ramirez DO at [facility] OR       Medications Prior to Admission:    Medications Prior to Admission: metoprolol tartrate (LOPRESSOR) 25 MG tablet, Take 0.5 tablets by mouth 2 times daily  ferrous sulfate (FE TABS) 325 (65 Fe) MG EC tablet, Take 1 tablet by mouth 2 times daily  Cholecalciferol (VITAMIN D3) 125 MCG (5000 UT) CAPS, Take 5,000 Units by mouth daily  atorvastatin (LIPITOR) 40 MG tablet, Take 1 tablet by mouth nightly  clopidogrel (PLAVIX) 75 MG tablet, Take 1 tablet by mouth daily  magnesium hydroxide (MILK OF MAGNESIA) 400 MG/5ML suspension, Take 5 mLs by mouth daily as needed for Constipation  aspirin 81 MG EC tablet, Take 1 tablet by mouth daily  docusate sodium (COLACE) 100 MG capsule, Take 1 capsule by mouth 2 times daily as needed for Constipation (constipation)    Allergies:    Cipro [ciprofloxacin hcl]    Social History:    reports that he quit smoking about 22 years ago. His smoking use included cigarettes. He has a 33.00 pack-year smoking history. He has never used smokeless tobacco. He reports current alcohol use of about 2.0 standard drinks per week. He reports that he does not use drugs. Family History:   Non-contributory to this Urological problem  family history is not on file. Review of Systems:  Constitutional: No fever or chills   Respiratory: negative for cough and hemoptysis  Cardiovascular: negative for chest pain and dyspnea  Gastrointestinal: negative for abdominal pain, diarrhea, nausea and vomiting   Derm: negative for rash and skin lesion(s)  Neurological: negative for seizures and tremors  Musculoskeletal: Negative    Psychiatric: Negative   : As above in the HPI, otherwise negative  All other reviews are negative    Physical Exam:     Vitals:  BP (!) 122/58   Pulse 84   Temp 98.8 °F (37.1 °C)   Resp 18   Ht 5' 10\" (1.778 m)   Wt 230 lb (104.3 kg)   SpO2 98%   BMI 33.00 kg/m²     General:  Awake, alert, oriented X 3. No apparent distress. HEENT:  Normocephalic, atraumatic. Lungs:  Respirations symmetric and non-labored.   Abdomen:  soft, nontender, no masses  Extremities:  No clubbing, cyanosis, or edema  Skin:  Warm and dry, no open lesions or rashes  Neuro: There are no motor or sensory deficits in the 4 quadrant extremities   Rectal: deferred  Genitourinary: cheney with dark cherry urine and clots, CBI at slow rate, urine backing up into CBI port     Labs:     Recent Labs     07/17/22  2357   WBC 13.5*   RBC 3.35*   HGB 10.4*   HCT 31.4*   MCV 93.7   MCH 31.0   MCHC 33.1   RDW 13.1      MPV 9.9         Recent Labs     07/17/22 2026   CREATININE 1.3*       Lab Results   Component Value Date    PSA 0.30 12/12/2020         Assessment/plan:  Gross Hematuria   Urinary retention   BPH s/p TURP (February 2021, Dr. Rudolph Rising [facility])  Hemorrhagic cystitis   Hx of  anal cancer post surgery and XRT in 2008     Patient urine was backing up into CBI port, cheney grossly bloody, difficult to irrigate. His cheney was removed. His genitalia were prepped and draped in sterile fashion. Lidocaine jelly was injected into his urethra. Following this attempted to insert a 25 French Simplastic Cheney catheter but was unable. A 22F 3 way cheney was inserted into the bladder after meeting a strong amount of resistance at the prostate. The balloon was inflated with 10cc of sterile water. He was then hand irrigated with over 2000cc of saline for large amount of clot retrieval. The CBI was reconnected and left at a moderate rate. Urine culture pending  Antibiotics per primary   Cytology ordered   Continue to watch the creatinine  Continue to watch the hemoglobin  His Plavix is currently on hold   Continue the CBI  Continue the manual irrigations every 2 hours and as needed gross hematuria  He will be made n.p.o. after midnight and re-evaluated in the morning  We will cont to follow     Electronically signed by ARGENTINA Nuñez CNP on 7/18/2022 at 8:42 AM    I agree with the assessment and plan of HAILEY Nuñez. I personally evaluated the patient and made any changes to reflect my impression and plan.

## 2022-07-18 NOTE — PROGRESS NOTES
[address] [place], [place] [address]   Progress Note    Admitting Date and Time: 7/17/2022  7:08 PM  Admit Dx: Hematuria [R31.9]    Subjective:    Patient seen and examined. Wife at bedside. Patient states hematuria began 2-4 days ago. Has hx squamous cell carcinoma of anus in 2008, s/p radiation treatment, lost bladder spasticity. Hx of hematuria 2 years ago, saw urology, underwent TURP. Recurrent hematuria last June, cystoscopy performed and urology cauterized bleeding areas in bladder. Reports bladder pressure. ROS:   Denies CP, SOB, subjective fever, chills, N/V/D, abdominal pain,  HA. All systems reviewed and negative unless otherwise documented.       atorvastatin  40 mg Oral Nightly    clopidogrel  75 mg Oral Daily    vitamin D3  5,000 Units Oral Daily    metoprolol tartrate  12.5 mg Oral BID    [START ON 7/19/2022] cefTRIAXone (ROCEPHIN) IV  1,000 mg IntraVENous Q24H     promethazine, 12.5 mg, Q6H PRN   Or  ondansetron, 4 mg, Q6H PRN  polyethylene glycol, 17 g, Daily PRN  acetaminophen, 650 mg, Q6H PRN   Or  acetaminophen, 650 mg, Q6H PRN       Objective:    /77   Pulse 87   Temp 99.7 °F (37.6 °C)   Resp 16   Ht 5' 10\" (1.778 m)   Wt 230 lb (104.3 kg)   SpO2 98%   BMI 33.00 kg/m²   General Appearance: alert and oriented to person, place and time and in no acute distress  Skin: warm and dry  Head: normocephalic and atraumatic  Eyes: pupils equal, round, and reactive to light, extraocular eye movements intact, conjunctivae normal  Neck: neck supple and non tender without mass   Pulmonary/Chest: clear to auscultation bilaterally- no wheezes, rales or rhonchi, normal air movement, no respiratory distress  Cardiovascular: normal rate, normal S1 and S2 and no carotid bruits  Abdomen: soft, non-tender, non-distended, normal bowel sounds, no masses or organomegaly  Extremities: no cyanosis, no clubbing and no edema  Neurologic: no cranial nerve deficit and speech normal  -: 3 way cheney with dark hematuria      Recent Labs     07/17/22 2026      K 3.8      CO2 22   BUN 19   CREATININE 1.3*   GLUCOSE 176*   CALCIUM 9.0       No results for input(s): ALKPHOS, PROT, LABALBU, BILITOT, AST, ALT in the last 72 hours. Recent Labs     07/17/22  2357   WBC 13.5*   RBC 3.35*   HGB 10.4*   HCT 31.4*   MCV 93.7   MCH 31.0   MCHC 33.1   RDW 13.1      MPV 9.9         Radiology:   No orders to display     No results found. Assessment:  Principal Problem:    Hematuria  Resolved Problems:    * No resolved hospital problems. *      Plan:  Gross hematuria  -continue CBI  -urology consult, await input  -continue ceftriaxone  -IV hydration, prn morphine for pain    2. CAD  -s/p CABG 2020  -hold plavix, continue statin    3.  HTN  -continue metoprolol     Electronically signed by ARGENTINA Sanchez - CNP on 7/18/2022 at 7:30 AM

## 2022-07-19 PROBLEM — R33.8 ACUTE URINARY RETENTION: Status: ACTIVE | Noted: 2022-07-19

## 2022-07-19 LAB
ALBUMIN SERPL-MCNC: 2.9 G/DL (ref 3.5–5.2)
ALP BLD-CCNC: 102 U/L (ref 40–129)
ALT SERPL-CCNC: 14 U/L (ref 0–40)
ANION GAP SERPL CALCULATED.3IONS-SCNC: 10 MMOL/L (ref 7–16)
AST SERPL-CCNC: 16 U/L (ref 0–39)
BASOPHILS ABSOLUTE: 0.04 E9/L (ref 0–0.2)
BASOPHILS RELATIVE PERCENT: 0.3 % (ref 0–2)
BILIRUB SERPL-MCNC: 0.5 MG/DL (ref 0–1.2)
BUN BLDV-MCNC: 14 MG/DL (ref 6–23)
CALCIUM SERPL-MCNC: 8.4 MG/DL (ref 8.6–10.2)
CHLORIDE BLD-SCNC: 106 MMOL/L (ref 98–107)
CO2: 22 MMOL/L (ref 22–29)
CREAT SERPL-MCNC: 0.9 MG/DL (ref 0.7–1.2)
EOSINOPHILS ABSOLUTE: 0.33 E9/L (ref 0.05–0.5)
EOSINOPHILS RELATIVE PERCENT: 2.9 % (ref 0–6)
GFR AFRICAN AMERICAN: >60
GFR NON-AFRICAN AMERICAN: >60 ML/MIN/1.73
GLUCOSE BLD-MCNC: 106 MG/DL (ref 74–99)
HCT VFR BLD CALC: 29.5 % (ref 37–54)
HEMOGLOBIN: 9.3 G/DL (ref 12.5–16.5)
IMMATURE GRANULOCYTES #: 0.05 E9/L
IMMATURE GRANULOCYTES %: 0.4 % (ref 0–5)
LYMPHOCYTES ABSOLUTE: 1.88 E9/L (ref 1.5–4)
LYMPHOCYTES RELATIVE PERCENT: 16.3 % (ref 20–42)
MCH RBC QN AUTO: 30.4 PG (ref 26–35)
MCHC RBC AUTO-ENTMCNC: 31.5 % (ref 32–34.5)
MCV RBC AUTO: 96.4 FL (ref 80–99.9)
MONOCYTES ABSOLUTE: 1.05 E9/L (ref 0.1–0.95)
MONOCYTES RELATIVE PERCENT: 9.1 % (ref 2–12)
NEUTROPHILS ABSOLUTE: 8.17 E9/L (ref 1.8–7.3)
NEUTROPHILS RELATIVE PERCENT: 71 % (ref 43–80)
PDW BLD-RTO: 13.3 FL (ref 11.5–15)
PLATELET # BLD: 247 E9/L (ref 130–450)
PMV BLD AUTO: 9.9 FL (ref 7–12)
POTASSIUM REFLEX MAGNESIUM: 4.1 MMOL/L (ref 3.5–5)
RBC # BLD: 3.06 E12/L (ref 3.8–5.8)
SODIUM BLD-SCNC: 138 MMOL/L (ref 132–146)
TOTAL PROTEIN: 6.2 G/DL (ref 6.4–8.3)
WBC # BLD: 11.5 E9/L (ref 4.5–11.5)

## 2022-07-19 PROCEDURE — 6370000000 HC RX 637 (ALT 250 FOR IP): Performed by: NURSE PRACTITIONER

## 2022-07-19 PROCEDURE — 88112 CYTOPATH CELL ENHANCE TECH: CPT

## 2022-07-19 PROCEDURE — 99232 SBSQ HOSP IP/OBS MODERATE 35: CPT | Performed by: NURSE PRACTITIONER

## 2022-07-19 PROCEDURE — 36415 COLL VENOUS BLD VENIPUNCTURE: CPT

## 2022-07-19 PROCEDURE — 6360000002 HC RX W HCPCS: Performed by: INTERNAL MEDICINE

## 2022-07-19 PROCEDURE — 6360000002 HC RX W HCPCS: Performed by: NURSE PRACTITIONER

## 2022-07-19 PROCEDURE — 6370000000 HC RX 637 (ALT 250 FOR IP): Performed by: INTERNAL MEDICINE

## 2022-07-19 PROCEDURE — 2580000003 HC RX 258: Performed by: INTERNAL MEDICINE

## 2022-07-19 PROCEDURE — 80053 COMPREHEN METABOLIC PANEL: CPT

## 2022-07-19 PROCEDURE — 1200000000 HC SEMI PRIVATE

## 2022-07-19 PROCEDURE — APPSS30 APP SPLIT SHARED TIME 16-30 MINUTES: Performed by: NURSE PRACTITIONER

## 2022-07-19 PROCEDURE — 85025 COMPLETE CBC W/AUTO DIFF WBC: CPT

## 2022-07-19 RX ORDER — TAMSULOSIN HYDROCHLORIDE 0.4 MG/1
0.4 CAPSULE ORAL DAILY
Status: DISCONTINUED | OUTPATIENT
Start: 2022-07-19 | End: 2022-07-21 | Stop reason: HOSPADM

## 2022-07-19 RX ADMIN — TAMSULOSIN HYDROCHLORIDE 0.4 MG: 0.4 CAPSULE ORAL at 14:58

## 2022-07-19 RX ADMIN — CEFTRIAXONE SODIUM 1000 MG: 1 INJECTION, POWDER, FOR SOLUTION INTRAMUSCULAR; INTRAVENOUS at 05:31

## 2022-07-19 RX ADMIN — METOPROLOL TARTRATE 12.5 MG: 25 TABLET, FILM COATED ORAL at 09:02

## 2022-07-19 RX ADMIN — METOPROLOL TARTRATE 12.5 MG: 25 TABLET, FILM COATED ORAL at 20:37

## 2022-07-19 RX ADMIN — ATORVASTATIN CALCIUM 40 MG: 40 TABLET, FILM COATED ORAL at 20:37

## 2022-07-19 RX ADMIN — ACETAMINOPHEN 650 MG: 325 TABLET ORAL at 20:44

## 2022-07-19 RX ADMIN — MORPHINE SULFATE 2 MG: 2 INJECTION, SOLUTION INTRAMUSCULAR; INTRAVENOUS at 05:32

## 2022-07-19 RX ADMIN — CHOLECALCIFEROL TAB 125 MCG (5000 UNIT) 5000 UNITS: 125 TAB at 09:02

## 2022-07-19 ASSESSMENT — PAIN SCALES - GENERAL
PAINLEVEL_OUTOF10: 9
PAINLEVEL_OUTOF10: 2

## 2022-07-19 ASSESSMENT — PAIN DESCRIPTION - ORIENTATION: ORIENTATION: LEFT

## 2022-07-19 ASSESSMENT — PAIN DESCRIPTION - DESCRIPTORS: DESCRIPTORS: ACHING

## 2022-07-19 ASSESSMENT — PAIN DESCRIPTION - LOCATION: LOCATION: HEAD

## 2022-07-19 NOTE — PROGRESS NOTES
7/19/2022 9:11 AM  Service: Urology  Group: [facility] ([facility])    Maria Del Carmen Rosenbaum  [id number]    Subjective:   Urine yellow with CBI at very slow rate  No complaints  No family present     Review of Systems  Constitutional: No fever or chills   Respiratory: negative for cough and hemoptysis  Cardiovascular: negative for chest pain and dyspnea  Gastrointestinal: negative for abdominal pain, diarrhea, nausea and vomiting   : See above  Derm: negative for rash and skin lesion(s)  Neurological: negative for seizures and tremors  Musculoskeletal: Negative    Psychiatric: Negative   All other reviews are negative      Scheduled Meds:   atorvastatin  40 mg Oral Nightly    [Held by provider] clopidogrel  75 mg Oral Daily    vitamin D3  5,000 Units Oral Daily    metoprolol tartrate  12.5 mg Oral BID    cefTRIAXone (ROCEPHIN) IV  1,000 mg IntraVENous Q24H       Objective:  Vitals:    07/19/22 0623   BP: (!) 159/78   Pulse: 90   Resp: 19   Temp: 99.1 °F (37.3 °C)   SpO2: 96%         Allergies: Cipro [ciprofloxacin hcl]    General Appearance: alert and oriented to person, place and time and in no acute distress  Skin: no rash or erythema  Head: normocephalic and atraumatic  Pulmonary/Chest: normal air movement, no respiratory distress  Abdomen: soft, non-tender, non-distended  Genitourinary: urine yellow with CBI at slow rate   Extremities: no cyanosis, clubbing or edema         Labs:     Recent Labs     07/17/22 2026      K 3.8      CO2 22   BUN 19   CREATININE 1.3*   GLUCOSE 176*   CALCIUM 9.0       Lab Results   Component Value Date/Time    HGB 10.4 07/17/2022 11:57 PM    HCT 31.4 07/17/2022 11:57 PM       Lab Results   Component Value Date    PSA 0.30 12/12/2020         Assessment/Plan:  Gross Hematuria  Urinary retention (600cc)  BPH s/p TURP (February 2021, Dr. Tiffanie Ramsey [facility])  Hemorrhagic cystitis   Hx of  anal cancer post surgery and XRT in 2008    Urine culture pending  Antibiotics per primary  Urine cytology pending   Hemoglobin stable   His Plavix is currently on hold   Will attempt to wean CBI today   Cont the manual irrigations   Reportedly had 600cc of retained urine when cheney was inserted in ER.  There was some resistance met at the prostate when inserting the 3 way cheney  I will restart him on Flomax   We will cont his cheney for now  Likely will need outpatient voiding trial and also will need cystoscopy in the office  Will follow      ARGENTINA Aranda - CNP   [facility]

## 2022-07-19 NOTE — CARE COORDINATION
CM note: chart reviewed for transition of care needs. Pt admitted with hematuria, urology consult, cheney inserted with CBI. Pt lives with spouse and is independent with ADLs. Will follow for possible home health care, for cheney care, however patient has hx of TURP in 2021 so patient may have experience caring for catheter at home.

## 2022-07-19 NOTE — PROGRESS NOTES
[address] [place], [place] [address]   Progress Note    Admitting Date and Time: 7/17/2022  7:08 PM  Admit Dx: Hemorrhagic cystitis [N30.91]  Renal insufficiency [N28.9]  Hematuria [R31.9]  Acute urinary retention [R33.8]    Subjective:    Patient seen and examined. CBI with yellow urine in cheney bag. Urine had backed up into CBI bag overnight and cheney was difficult to irrigate. 3 way cheney was removed and reinserted per urology with some degree of difficulty. ROS:   Denies CP, SOB, subjective fever, chills, N/V/D, abdominal pain,  HA. All systems reviewed and negative unless otherwise documented.       atorvastatin  40 mg Oral Nightly    [Held by provider] clopidogrel  75 mg Oral Daily    vitamin D3  5,000 Units Oral Daily    metoprolol tartrate  12.5 mg Oral BID    cefTRIAXone (ROCEPHIN) IV  1,000 mg IntraVENous Q24H     promethazine, 12.5 mg, Q6H PRN   Or  ondansetron, 4 mg, Q6H PRN  polyethylene glycol, 17 g, Daily PRN  acetaminophen, 650 mg, Q6H PRN   Or  acetaminophen, 650 mg, Q6H PRN  morphine, 2 mg, Q4H PRN       Objective:    BP (!) 159/78   Pulse 90   Temp 99.1 °F (37.3 °C) (Oral)   Resp 19   Ht 5' 10\" (1.778 m)   Wt 230 lb (104.3 kg)   SpO2 96%   BMI 33.00 kg/m²   General Appearance: alert and oriented to person, place and time and in no acute distress  Skin: warm and dry  Head: normocephalic and atraumatic  Eyes: pupils equal, round, and reactive to light, extraocular eye movements intact, conjunctivae normal  Neck: neck supple and non tender without mass   Pulmonary/Chest: clear to auscultation bilaterally- no wheezes, rales or rhonchi, normal air movement, no respiratory distress  Cardiovascular: normal rate, normal S1 and S2 and no carotid bruits  Abdomen: soft, non-tender, non-distended, normal bowel sounds, no masses or organomegaly  Extremities: no cyanosis, no clubbing and no edema  Neurologic: no cranial nerve deficit and speech normal  -: 3 way cheney with yellow urine      Recent Labs     07/17/22 2026      K 3.8      CO2 22   BUN 19   CREATININE 1.3*   GLUCOSE 176*   CALCIUM 9.0         No results for input(s): ALKPHOS, PROT, LABALBU, BILITOT, AST, ALT in the last 72 hours. Recent Labs     07/17/22  2357   WBC 13.5*   RBC 3.35*   HGB 10.4*   HCT 31.4*   MCV 93.7   MCH 31.0   MCHC 33.1   RDW 13.1      MPV 9.9           Radiology:   No orders to display     No results found. Assessment:  Principal Problem:    Hematuria  Resolved Problems:    * No resolved hospital problems. *      Plan:  Gross hematuria  -continue CBI w/ manual irrigations Q2H per urology  -continue ceftriaxone  -IV hydration, prn morphine for pain    2. CAD  -s/p CABG 2020  -hold plavix, continue statin    3. HTN  -continue metoprolol    4.  Hx BPH  -s/p TURP 2021     Electronically signed by ARGENTINA Nesbitt - CNP on 7/19/2022 at 7:15 AM

## 2022-07-19 NOTE — CARE COORDINATION
Called the [place] and gave Jennifer all patients intake information.  Electronically signed by Tavo Nunn on 7/19/2022 at 2:17 PM

## 2022-07-20 LAB
BASOPHILS ABSOLUTE: 0.02 E9/L (ref 0–0.2)
BASOPHILS RELATIVE PERCENT: 0.2 % (ref 0–2)
EOSINOPHILS ABSOLUTE: 0.45 E9/L (ref 0.05–0.5)
EOSINOPHILS RELATIVE PERCENT: 4.6 % (ref 0–6)
HCT VFR BLD CALC: 29.1 % (ref 37–54)
HEMOGLOBIN: 9.2 G/DL (ref 12.5–16.5)
IMMATURE GRANULOCYTES #: 0.04 E9/L
IMMATURE GRANULOCYTES %: 0.4 % (ref 0–5)
LYMPHOCYTES ABSOLUTE: 1.48 E9/L (ref 1.5–4)
LYMPHOCYTES RELATIVE PERCENT: 15 % (ref 20–42)
MCH RBC QN AUTO: 30.8 PG (ref 26–35)
MCHC RBC AUTO-ENTMCNC: 31.6 % (ref 32–34.5)
MCV RBC AUTO: 97.3 FL (ref 80–99.9)
MONOCYTES ABSOLUTE: 0.76 E9/L (ref 0.1–0.95)
MONOCYTES RELATIVE PERCENT: 7.7 % (ref 2–12)
NEUTROPHILS ABSOLUTE: 7.09 E9/L (ref 1.8–7.3)
NEUTROPHILS RELATIVE PERCENT: 72.1 % (ref 43–80)
PDW BLD-RTO: 13.1 FL (ref 11.5–15)
PLATELET # BLD: 250 E9/L (ref 130–450)
PMV BLD AUTO: 10.1 FL (ref 7–12)
RBC # BLD: 2.99 E12/L (ref 3.8–5.8)
URINE CULTURE, ROUTINE: NORMAL
WBC # BLD: 9.8 E9/L (ref 4.5–11.5)

## 2022-07-20 PROCEDURE — 2580000003 HC RX 258: Performed by: NURSE PRACTITIONER

## 2022-07-20 PROCEDURE — 99239 HOSP IP/OBS DSCHRG MGMT >30: CPT | Performed by: NURSE PRACTITIONER

## 2022-07-20 PROCEDURE — 6370000000 HC RX 637 (ALT 250 FOR IP): Performed by: NURSE PRACTITIONER

## 2022-07-20 PROCEDURE — 36415 COLL VENOUS BLD VENIPUNCTURE: CPT

## 2022-07-20 PROCEDURE — 6360000002 HC RX W HCPCS: Performed by: INTERNAL MEDICINE

## 2022-07-20 PROCEDURE — 6370000000 HC RX 637 (ALT 250 FOR IP): Performed by: INTERNAL MEDICINE

## 2022-07-20 PROCEDURE — 2580000003 HC RX 258: Performed by: INTERNAL MEDICINE

## 2022-07-20 PROCEDURE — 85025 COMPLETE CBC W/AUTO DIFF WBC: CPT

## 2022-07-20 PROCEDURE — 1200000000 HC SEMI PRIVATE

## 2022-07-20 PROCEDURE — APPSS45 APP SPLIT SHARED TIME 31-45 MINUTES: Performed by: NURSE PRACTITIONER

## 2022-07-20 RX ORDER — TAMSULOSIN HYDROCHLORIDE 0.4 MG/1
0.4 CAPSULE ORAL DAILY
Qty: 30 CAPSULE | Refills: 3 | Status: SHIPPED | OUTPATIENT
Start: 2022-07-21

## 2022-07-20 RX ADMIN — CHOLECALCIFEROL TAB 125 MCG (5000 UNIT) 5000 UNITS: 125 TAB at 09:32

## 2022-07-20 RX ADMIN — SODIUM CHLORIDE: 9 INJECTION, SOLUTION INTRAVENOUS at 05:58

## 2022-07-20 RX ADMIN — TAMSULOSIN HYDROCHLORIDE 0.4 MG: 0.4 CAPSULE ORAL at 09:33

## 2022-07-20 RX ADMIN — METOPROLOL TARTRATE 12.5 MG: 25 TABLET, FILM COATED ORAL at 09:31

## 2022-07-20 RX ADMIN — ATORVASTATIN CALCIUM 40 MG: 40 TABLET, FILM COATED ORAL at 20:40

## 2022-07-20 RX ADMIN — METOPROLOL TARTRATE 12.5 MG: 25 TABLET, FILM COATED ORAL at 20:40

## 2022-07-20 RX ADMIN — CEFTRIAXONE SODIUM 1000 MG: 1 INJECTION, POWDER, FOR SOLUTION INTRAMUSCULAR; INTRAVENOUS at 05:59

## 2022-07-20 NOTE — CARE COORDINATION
Patient was to be discharged today. Floor RN called to say patient had sudden onset 3 loose stools. Will send for c.diff if patient has a 4th. Hold DC for now.  Re-evaluate in the am.      Electronically signed by ARGENTINA Nj CNP on 7/20/2022 at 3:52 PM

## 2022-07-20 NOTE — PROGRESS NOTES
[address] [place], [place] [address]   Progress Note    Admitting Date and Time: 7/17/2022  7:08 PM  Admit Dx: Hemorrhagic cystitis [N30.91]  Renal insufficiency [N28.9]  Hematuria [R31.9]  Acute urinary retention [R33.8]    Subjective:    Patient seen and examined. Continues to have yellow urine in cheney with CBI. Denies pressure to lower abdomen. Ambulated in room. Tolerating diet. Offers no complaints. ROS:   Denies CP, SOB, subjective fever, chills, N/V/D, abdominal pain,  HA. All systems reviewed and negative unless otherwise documented.       tamsulosin  0.4 mg Oral Daily    COVID-19 mRNA Vacc (Moderna)  0.25 mL IntraMUSCular Once    atorvastatin  40 mg Oral Nightly    [Held by provider] clopidogrel  75 mg Oral Daily    vitamin D3  5,000 Units Oral Daily    metoprolol tartrate  12.5 mg Oral BID    cefTRIAXone (ROCEPHIN) IV  1,000 mg IntraVENous Q24H     promethazine, 12.5 mg, Q6H PRN   Or  ondansetron, 4 mg, Q6H PRN  polyethylene glycol, 17 g, Daily PRN  acetaminophen, 650 mg, Q6H PRN   Or  acetaminophen, 650 mg, Q6H PRN  morphine, 2 mg, Q4H PRN       Objective:    /66   Pulse 82   Temp 98.2 °F (36.8 °C)   Resp 16   Ht 5' 10\" (1.778 m)   Wt 230 lb (104.3 kg)   SpO2 96%   BMI 33.00 kg/m²   General Appearance: alert and oriented to person, place and time and in no acute distress  Skin: warm and dry  Head: normocephalic and atraumatic  Eyes: pupils equal, round, and reactive to light, extraocular eye movements intact, conjunctivae normal  Neck: neck supple and non tender without mass   Pulmonary/Chest: clear to auscultation bilaterally- no wheezes, rales or rhonchi, normal air movement, no respiratory distress  Cardiovascular: normal rate, normal S1 and S2 and no carotid bruits  Abdomen: soft, non-tender, non-distended, normal bowel sounds, no masses or organomegaly  Extremities: no cyanosis, no clubbing and no edema  Neurologic: no cranial nerve deficit and speech normal  -: 3 way cheney with yellow urine      Recent Labs     07/17/22 2026 07/19/22  0927    138   K 3.8 4.1    106   CO2 22 22   BUN 19 14   CREATININE 1.3* 0.9   GLUCOSE 176* 106*   CALCIUM 9.0 8.4*       Recent Labs     07/19/22  0927   ALKPHOS 102   PROT 6.2*   LABALBU 2.9*   BILITOT 0.5   AST 16   ALT 14       Recent Labs     07/17/22  2357 07/19/22  0927 07/20/22  0848   WBC 13.5* 11.5 9.8   RBC 3.35* 3.06* 2.99*   HGB 10.4* 9.3* 9.2*   HCT 31.4* 29.5* 29.1*   MCV 93.7 96.4 97.3   MCH 31.0 30.4 30.8   MCHC 33.1 31.5* 31.6*   RDW 13.1 13.3 13.1    247 250   MPV 9.9 9.9 10.1         Radiology:   No orders to display     No results found. Assessment:  Principal Problem:    Hematuria  Active Problems:    Acute urinary retention  Resolved Problems:    * No resolved hospital problems. *      Plan:  Gross hematuria  -continue CBI per urology  -continue ceftriaxone (day 3), urine cx <10,000 organisms  -prn morphine for pain  -will likely be discharged with cheney and will need to follow up outpatient for voiding trial and in office cystoscopy per urology  -hgb stable    2. CAD  -s/p CABG 2020  -ok to resume plavix per urology. Continue statin    3. HTN  -continue metoprolol    4.  Hx BPH  -s/p TURP 2021  - flomax resumed per urology     Electronically signed by ARGENTINA Madden - CNP on 7/20/2022 at 10:14 AM

## 2022-07-20 NOTE — DISCHARGE SUMMARY
General Appearance: alert and oriented to person, place and time and in no acute distress  Skin: warm and dry  Head: normocephalic and atraumatic  Eyes: pupils equal, round, and reactive to light, extraocular eye movements intact, conjunctivae normal  Neck: neck supple and non tender without mass  Pulmonary/Chest: clear to auscultation bilaterally- no wheezes, rales or rhonchi, normal air movement, no respiratory distress  Cardiovascular: normal rate, normal S1 and S2 and no carotid bruits  Abdomen: soft, non-tender, non-distended, normal bowel sounds, no masses or organomegaly  Extremities: no cyanosis, no clubbing and no edema  Neurologic: no cranial nerve deficit and speech normal  : 3 way cheney with yellow urine      I/O last 3 completed shifts: In: 2017.9 [P.O.:600; I.V.:1417.9]  Out: 1700 [Urine:1700]  I/O this shift:  In: 360 [P.O.:360]  Out: 450 [Urine:450]      LABS:  Recent Labs     07/17/22 2026 07/19/22  0927    138   K 3.8 4.1    106   CO2 22 22   BUN 19 14   CREATININE 1.3* 0.9   GLUCOSE 176* 106*   CALCIUM 9.0 8.4*       Recent Labs     07/17/22  2357 07/19/22  0927 07/20/22  0848   WBC 13.5* 11.5 9.8   RBC 3.35* 3.06* 2.99*   HGB 10.4* 9.3* 9.2*   HCT 31.4* 29.5* 29.1*   MCV 93.7 96.4 97.3   MCH 31.0 30.4 30.8   MCHC 33.1 31.5* 31.6*   RDW 13.1 13.3 13.1    247 250   MPV 9.9 9.9 10.1       No results for input(s): POCGLU in the last 72 hours. Imaging:  No results found. Patient Instructions:      Medication List        START taking these medications      tamsulosin 0.4 MG capsule  Commonly known as: FLOMAX  Take 1 capsule by mouth in the morning.   Start taking on: July 21, 2022            CONTINUE taking these medications      aspirin 81 MG EC tablet  Take 1 tablet by mouth daily     atorvastatin 40 MG tablet  Commonly known as: LIPITOR  Take 1 tablet by mouth nightly     clopidogrel 75 MG tablet  Commonly known as: PLAVIX  Take 1 tablet by mouth daily docusate sodium 100 MG capsule  Commonly known as: COLACE  Take 1 capsule by mouth 2 times daily as needed for Constipation (constipation)     magnesium hydroxide 400 MG/5ML suspension  Commonly known as: MILK OF MAGNESIA     metoprolol tartrate 25 MG tablet  Commonly known as: LOPRESSOR  Take 0.5 tablets by mouth 2 times daily     SYNTHROID PO     vitamin D3 125 MCG (5000 UT) Caps            STOP taking these medications      ferrous sulfate 325 (65 Fe) MG EC tablet  Commonly known as: Fe Tabs               Where to Get Your Medications        These medications were sent to [facility] [place] Lizzeth Castro, [address] [phone]  [address] [place] [address]., [facility] [address]      Phone: [phone]   tamsulosin 0.4 MG capsule          Note that more than 30 minutes was spent in preparing discharge papers, discussing discharge with patient, medication review, etc.       Signed:  Electronically signed by ARGENTINA Gordon CNP on 7/20/2022 at 1:50 PM

## 2022-07-20 NOTE — PROGRESS NOTES
CLINICAL PHARMACY NOTE: MEDS TO BEDS    Total # of Prescriptions Filled: 1   The following medications were delivered to the patient:  Tamsulosin 0.4 mg    Additional Documentation:

## 2022-07-21 VITALS
DIASTOLIC BLOOD PRESSURE: 72 MMHG | HEIGHT: 70 IN | TEMPERATURE: 98.5 F | RESPIRATION RATE: 16 BRPM | WEIGHT: 230 LBS | SYSTOLIC BLOOD PRESSURE: 132 MMHG | BODY MASS INDEX: 32.93 KG/M2 | HEART RATE: 82 BPM | OXYGEN SATURATION: 99 %

## 2022-07-21 LAB
ANION GAP SERPL CALCULATED.3IONS-SCNC: 10 MMOL/L (ref 7–16)
BASOPHILS ABSOLUTE: 0.03 E9/L (ref 0–0.2)
BASOPHILS RELATIVE PERCENT: 0.3 % (ref 0–2)
BUN BLDV-MCNC: 14 MG/DL (ref 6–23)
CALCIUM SERPL-MCNC: 8.6 MG/DL (ref 8.6–10.2)
CHLORIDE BLD-SCNC: 106 MMOL/L (ref 98–107)
CO2: 23 MMOL/L (ref 22–29)
CREAT SERPL-MCNC: 0.9 MG/DL (ref 0.7–1.2)
EOSINOPHILS ABSOLUTE: 0.46 E9/L (ref 0.05–0.5)
EOSINOPHILS RELATIVE PERCENT: 4.9 % (ref 0–6)
GFR AFRICAN AMERICAN: >60
GFR NON-AFRICAN AMERICAN: >60 ML/MIN/1.73
GLUCOSE BLD-MCNC: 99 MG/DL (ref 74–99)
HCT VFR BLD CALC: 28.8 % (ref 37–54)
HEMOGLOBIN: 9 G/DL (ref 12.5–16.5)
IMMATURE GRANULOCYTES #: 0.05 E9/L
IMMATURE GRANULOCYTES %: 0.5 % (ref 0–5)
LYMPHOCYTES ABSOLUTE: 1.5 E9/L (ref 1.5–4)
LYMPHOCYTES RELATIVE PERCENT: 16.1 % (ref 20–42)
MCH RBC QN AUTO: 29.8 PG (ref 26–35)
MCHC RBC AUTO-ENTMCNC: 31.3 % (ref 32–34.5)
MCV RBC AUTO: 95.4 FL (ref 80–99.9)
MONOCYTES ABSOLUTE: 0.88 E9/L (ref 0.1–0.95)
MONOCYTES RELATIVE PERCENT: 9.5 % (ref 2–12)
NEUTROPHILS ABSOLUTE: 6.39 E9/L (ref 1.8–7.3)
NEUTROPHILS RELATIVE PERCENT: 68.7 % (ref 43–80)
PDW BLD-RTO: 13 FL (ref 11.5–15)
PLATELET # BLD: 287 E9/L (ref 130–450)
PMV BLD AUTO: 10 FL (ref 7–12)
POTASSIUM SERPL-SCNC: 3.7 MMOL/L (ref 3.5–5)
RBC # BLD: 3.02 E12/L (ref 3.8–5.8)
SODIUM BLD-SCNC: 139 MMOL/L (ref 132–146)
WBC # BLD: 9.3 E9/L (ref 4.5–11.5)

## 2022-07-21 PROCEDURE — 36415 COLL VENOUS BLD VENIPUNCTURE: CPT

## 2022-07-21 PROCEDURE — 6370000000 HC RX 637 (ALT 250 FOR IP): Performed by: NURSE PRACTITIONER

## 2022-07-21 PROCEDURE — 6360000002 HC RX W HCPCS: Performed by: INTERNAL MEDICINE

## 2022-07-21 PROCEDURE — 85025 COMPLETE CBC W/AUTO DIFF WBC: CPT

## 2022-07-21 PROCEDURE — 80048 BASIC METABOLIC PNL TOTAL CA: CPT

## 2022-07-21 PROCEDURE — 2580000003 HC RX 258: Performed by: INTERNAL MEDICINE

## 2022-07-21 PROCEDURE — APPSS30 APP SPLIT SHARED TIME 16-30 MINUTES: Performed by: NURSE PRACTITIONER

## 2022-07-21 PROCEDURE — 99232 SBSQ HOSP IP/OBS MODERATE 35: CPT | Performed by: INTERNAL MEDICINE

## 2022-07-21 PROCEDURE — 6370000000 HC RX 637 (ALT 250 FOR IP): Performed by: INTERNAL MEDICINE

## 2022-07-21 RX ADMIN — METOPROLOL TARTRATE 12.5 MG: 25 TABLET, FILM COATED ORAL at 08:38

## 2022-07-21 RX ADMIN — CHOLECALCIFEROL TAB 125 MCG (5000 UNIT) 5000 UNITS: 125 TAB at 08:40

## 2022-07-21 RX ADMIN — TAMSULOSIN HYDROCHLORIDE 0.4 MG: 0.4 CAPSULE ORAL at 08:38

## 2022-07-21 RX ADMIN — CEFTRIAXONE SODIUM 1000 MG: 1 INJECTION, POWDER, FOR SOLUTION INTRAMUSCULAR; INTRAVENOUS at 05:55

## 2022-07-21 RX ADMIN — CLOPIDOGREL BISULFATE 75 MG: 75 TABLET ORAL at 08:38

## 2022-07-21 NOTE — DISCHARGE SUMMARY
[facility] Physician Discharge Summary       Angelica Duarte   [address] [id number] [phone]    Follow up in 1 week(s)      Salma Pierre MD  [address]  [phone]    Schedule an appointment as soon as possible for a visit in 1 week(s)      Activity level: ad lulu    Diet: ADULT DIET; Regular    Condition at discharge: stable    Dispo:home        Patient ID:  Lina Middleton  [id number]  31 y.o.  1945    Admit date: 7/17/2022    Discharge date and time:  7/21/2022  8:38 AM    Admission Diagnoses: Principal Problem:    Hematuria  Active Problems:    Acute urinary retention  Resolved Problems:    * No resolved hospital problems. *      Discharge Diagnoses: Principal Problem:    Hematuria  Active Problems:    Acute urinary retention  Resolved Problems:    * No resolved hospital problems. *      Consults:  IP CONSULT TO UROLOGY  IP CONSULT TO PHARMACY    Procedures:   none    Hospital Course:   78year old who was admitted for gross hematuria after presenting to the ER for same. Consult was made to urology. Three way cheney inserted and continuous bladder irrigation was initiated. Home Plavix was held and H&H closely monitored. On 07/19, patient urine had backed up into CBI bag overnight and cheney was difficult to irrigate. Three way cheney was removed and reinserted per urology with some degree of difficulty. CBI was resumed and patient was started on Flomax. He was also resumed on his home Plavix once hematuria resolved. He is stable for discharge to home with Cheney. He will need to follow up with urology next week. Patinet discharge was held overnight due to 2 episodes of loose stool which resolved. He will be discharged home today.     Discharge Exam:  Vitals:    07/20/22 0931 07/20/22 1747 07/21/22 0530 07/21/22 0830   BP: 132/66 137/80 124/60 129/63   Pulse: 82 97 94 86   Resp:  18 16 16   Temp:  98.1 °F (36.7 °C) 99.3 °F (37.4 °C) 98.5 °F (36.9 °C)   TempSrc:  Oral  Oral   SpO2:  98% 99%    Weight:       Height:             General Appearance: alert and oriented to person, place and time and in no acute distress  Skin: warm and dry  Head: normocephalic and atraumatic  Eyes: pupils equal, round, and reactive to light, extraocular eye movements intact, conjunctivae normal  Neck: neck supple and non tender without mass  Pulmonary/Chest: clear to auscultation bilaterally- no wheezes, rales or rhonchi, normal air movement, no respiratory distress  Cardiovascular: normal rate, normal S1 and S2 and no carotid bruits  Abdomen: soft, non-tender, non-distended, normal bowel sounds, no masses or organomegaly  Extremities: no cyanosis, no clubbing and no edema  Neurologic: no cranial nerve deficit and speech normal  : 3 way cheney with yellow urine      I/O last 3 completed shifts: In: 360 [P.O.:360]  Out: 1400 [Urine:1400]  No intake/output data recorded. LABS:  Recent Labs     07/19/22  0927 07/21/22  0453    139   K 4.1 3.7    106   CO2 22 23   BUN 14 14   CREATININE 0.9 0.9   GLUCOSE 106* 99   CALCIUM 8.4* 8.6         Recent Labs     07/19/22  0927 07/20/22  0848 07/21/22  0453   WBC 11.5 9.8 9.3   RBC 3.06* 2.99* 3.02*   HGB 9.3* 9.2* 9.0*   HCT 29.5* 29.1* 28.8*   MCV 96.4 97.3 95.4   MCH 30.4 30.8 29.8   MCHC 31.5* 31.6* 31.3*   RDW 13.3 13.1 13.0    250 287   MPV 9.9 10.1 10.0         No results for input(s): POCGLU in the last 72 hours. Imaging:  No results found. Patient Instructions:      Medication List        START taking these medications      tamsulosin 0.4 MG capsule  Commonly known as: FLOMAX  Take 1 capsule by mouth in the morning.             CONTINUE taking these medications      aspirin 81 MG EC tablet  Take 1 tablet by mouth daily     atorvastatin 40 MG tablet  Commonly known as: LIPITOR  Take 1 tablet by mouth nightly     clopidogrel 75 MG tablet  Commonly known as: PLAVIX  Take 1 tablet by mouth daily     docusate sodium 100 MG capsule  Commonly known as: COLACE  Take 1 capsule by mouth 2 times daily as needed for Constipation (constipation)     magnesium hydroxide 400 MG/5ML suspension  Commonly known as: MILK OF MAGNESIA     metoprolol tartrate 25 MG tablet  Commonly known as: LOPRESSOR  Take 0.5 tablets by mouth 2 times daily     SYNTHROID PO     vitamin D3 125 MCG (5000 UT) Caps            STOP taking these medications      ferrous sulfate 325 (65 Fe) MG EC tablet  Commonly known as: Fe Tabs               Where to Get Your Medications        These medications were sent to [facility] Lizzeth Castro, [facility] [phone]  [address] [place] [address]., Larissa Damian [address]      Phone: [phone]   tamsulosin 0.4 MG capsule          Note that more than 30 minutes was spent in preparing discharge papers, discussing discharge with patient, medication review, etc.       Signed:  Electronically signed by ARGENTINA Botello CNP on 7/21/2022 at 8:38 AM

## 2022-07-21 NOTE — DISCHARGE INSTRUCTIONS
Dr. Ashley Adames. I understand and acknowledge receipt of the instructions indicated above. Place the patients cheney to leg bag on discharge from the hospital.  Please give the patient a night bag (large bag) and cheney instructions upon discharge. Please have the patient contact the office for cheney removal instructions. The catheter may go red (hematuria), may go clear, and may go red. This is a normal process, as long as the catheter is draining. Call the office if any concerns should arise for further instructions, [phone]. Call upon discharge to schedule a follow-up visit with Eduin Ramirez/Donny/Can ([facility]) at [phone] About Indwelling Urinary Catheter Care to Prevent Infection  Overview     A urinary catheter is a flexible plastic tube that's used to drain urine from your bladder when you can't urinate on your own. The catheter allows urine to drain from the bladder into a bag. Two types of drainage bags may be used with a urinary catheter. A bedside bag is a large bag that you can hang on the side of your bed or on a chair. You can use it overnight or anytime you will be sitting or lying down for a long time. A leg bag is a small bag that you can use during the day. It is usually attached to your thigh or calf and hidden under your clothes. Having a urinary catheter increases your risk of getting a urinary tract infection. Germs may get on the catheter and cause an infection in your bladder or kidneys. The longer you have a catheter, the more likely it is that you will get an infection. You can help prevent this problem with good hygiene and careful handling of your catheter and drainage bags. How can you help prevent infection? Take care to stay clean  Always wash your hands well before and after you handle your catheter. Clean the skin around the catheter daily using soap and water. Dry with a clean towel afterward.  You can shower with your catheter and drainage bag in place unless your doctor told you not to. When you clean around the catheter, check the surrounding skin for signs of infection. Look for things like pus and irritated, swollen, red, or tender skin around the catheter. Be careful with your drainage bag  Always keep the drainage bag below the level of your bladder. This will help keep urine from flowing back into your bladder. Check often to see that urine is flowing through the catheter into the drainage bag. Empty the drainage bag when it is half full. This will keep it from overflowing or backing up. When you empty the drainage bag, do not let the tubing or drain spout touch anything. Keep the cap that comes with the tubing, and cover the tip of the tubing when not in use. Be careful with your catheter  Do not unhook the catheter from the drain tube until you are ready to change the tubing and bag. That could let germs get into the tube. Make sure that the catheter tubing does not get twisted or kinked. Do not tug or pull on the catheter. And make sure that the drainage bag does not drag or pull on the catheter. Do not put powder or lotion on the skin around the catheter. Talk with your doctor about your options for sexual intercourse while wearing a catheter. How do you empty the bag? If your doctor has asked you to keep a record, write down the amount of urine in the bag before you empty it. Wash your hands before and after you touch the bag. Remove the drain spout from its sleeve at the bottom of the drainage bag. Open the valve on the drain spout. Let the urine flow out into the toilet or a container. Be careful not to let the tubing or drain spout touch anything. After you empty the bag, close the valve. Then put the drain spout back into its sleeve at the bottom of the collection bag. How do you switch to a bedside bag for overnight use? Wash your hands before and after you handle the bags.   Empty the leg bag that is attached to the tubing and catheter. Put a clean towel under the tubing attached to the leg bag. Use an alcohol wipe to clean the tip of the tubing attached to the bedside bag. To stop the flow of urine, pinch the catheter with your fingers just above the tubing connection. Use a twisting motion to disconnect the leg bag tubing from the catheter. Then securely connect the catheter to the tubing from the bedside bag. How do you clean a bedside bag? Many people clean their bedside bag in the morning if they switch to a leg bag. To clean a bedside drainage bag:  Remove the bedside bag and attach the leg bag. Fill the bedside bag with 2 parts vinegar and 3 parts water. Let it stand for 20 minutes. Empty the bag, and let it air dry. When should you call for help? Call your doctor now or seek immediate medical care if:    You have symptoms of a urinary infection. These may include:  Pain or burning when you urinate. A frequent need to urinate without being able to pass much urine. Pain in the flank, which is just below the rib cage and above the waist on either side of the back. Blood in your urine. A fever. Your urine smells bad. You see large blood clots in your urine. No urine or very little urine is flowing into the bag for 4 or more hours. Watch closely for changes in your health, and be sure to contact your doctor if:    The area around the catheter becomes irritated, swollen, red, or tender, or there is pus draining from it. Urine is leaking from the place where the catheter enters your body. Follow-up care is a key part of your treatment and safety. Be sure to make and go to all appointments, and call your doctor if you are having problems. It's also a good idea to know your test results and keep a list of the medicines you take. Where can you learn more? Go to [email or web address] org and sign in to your yWorld account.  Enter U010 in the [address]

## 2022-07-21 NOTE — PROGRESS NOTES
7/21/2022 10:15 AM  Service: Urology  Group: [facility] u[facility] (James/Donny/Can)    Maegan Curry  [id number]    Subjective:   Urine remains yellow   No complaints  Going home today     Review of Systems  Constitutional: No fever or chills   Respiratory: negative for cough and hemoptysis  Cardiovascular: negative for chest pain and dyspnea  Gastrointestinal: negative for abdominal pain, diarrhea, nausea and vomiting   : See above  Derm: negative for rash and skin lesion(s)  Neurological: negative for seizures and tremors  Musculoskeletal: Negative    Psychiatric: Negative   All other reviews are negative      Scheduled Meds:   tamsulosin  0.4 mg Oral Daily    COVID-19 mRNA Vacc (Moderna)  0.25 mL IntraMUSCular Once    atorvastatin  40 mg Oral Nightly    clopidogrel  75 mg Oral Daily    vitamin D3  5,000 Units Oral Daily    metoprolol tartrate  12.5 mg Oral BID    cefTRIAXone (ROCEPHIN) IV  1,000 mg IntraVENous Q24H       Objective:  Vitals:    07/21/22 0838   BP: 132/72   Pulse: 82   Resp:    Temp:    SpO2:          Allergies: Cipro [ciprofloxacin hcl]    General Appearance: alert and oriented to person, place and time and in no acute distress  Skin: no rash or erythema  Head: normocephalic and atraumatic  Pulmonary/Chest: normal air movement, no respiratory distress  Abdomen: soft, non-tender, non-distended  Genitourinary: cheney intact draining yellow urine   Extremities: no cyanosis, clubbing or edema         Labs:     Recent Labs     07/21/22  0453      K 3.7      CO2 23   BUN 14   CREATININE 0.9   GLUCOSE 99   CALCIUM 8.6       Lab Results   Component Value Date/Time    HGB 9.0 07/21/2022 04:53 AM    HCT 28.8 07/21/2022 04:53 AM       Lab Results   Component Value Date    PSA 0.30 12/12/2020         Assessment/Plan:  Gross Hematuria  Urinary retention (600cc)  BPH s/p TURP (February 2021, Dr. Talib Alcantara James)  Hemorrhagic cystitis   Hx of  anal cancer post surgery and XRT in 2008      Antibiotics per primary  Urine cytology pending   Ok to resume Plavix   Cont the Flomax   Reportedly had 600cc of retained urine when cheney was inserted in ER.  There was some resistance met at the prostate when inserting the 3 way cheney  Cont the cheney   Will follow up outpatient for a voiding trial   No further  interventions are planned at this time  Please call with any further questions or concerns  Thank you for allowing us to participate in his  care       Cash Caballero, [facility]  Urology

## 2022-07-22 ENCOUNTER — CARE COORDINATION (OUTPATIENT)
Dept: CARE COORDINATION | Age: 77
End: 2022-07-22

## 2022-07-22 NOTE — CARE COORDINATION
Care Transitions Outreach Attempt    Call within 2 business days of discharge: Yes   Attempted to reach patient by telephone. Unable to reach patient. Left HIPAA compliant message requesting a return call. Will attempt to reach patient again. Patient: Michoacano Wiggins Patient : 1945 MRN: [id number]    Last Discharge [facility]       Date Complaint Diagnosis Description Type Department Provider    22 Hematuria Acute urinary retention . .. ED to Hosp-Admission (Discharged) (ADMITTED) [facility] 3 Dalton Saeed MD; Jah Gleason. .. Was this an external facility discharge? No Discharge Facility: [facility]      Noted following upcoming appointments from discharge chart review:   [facility] follow up appointment(s): No future appointments.   [facility] follow up appointment(s):

## 2022-07-25 ENCOUNTER — CARE COORDINATION (OUTPATIENT)
Dept: CARE COORDINATION | Age: 77
End: 2022-07-25

## 2022-07-25 NOTE — CARE COORDINATION
Tika 45 Transitions Initial Follow Up Call    Call within 2 business days of discharge: Yes    Patient: Amanda Major Patient :    MRN: [id number]  Reason for Admission: Hematuria  Discharge Date: 22 RARS: Readmission Risk Score: 10.3      Last Discharge [facility]       Date Complaint Diagnosis Description Type Department Provider    22 Hematuria Acute urinary retention . .. ED to Hosp-Admission (Discharged) (ADMITTED) [facility] 3 Dionne Hodges MD; Emerita Kothari. .. Transitions of Care Initial Call  Spoke with patient's wife Tiny Houston (on HIPAA). Patient has indwelling Estrada catheter and states that the urine is draining well, clear, no odor, blood or mucus shreds. She does state that she can see a minimal amount of sediment. All medications written on discharge have been filled and patient is taking them as written. Medications were reviewed. Appetite:  States his appetite is decreased. Advised small meals throughout day as well as hydration. Wife voices understanding. Bowels/bladder:  Indwelling Estrada catheter, no issues or concerns. No constipation or diarrhea. Follow up appointments:  Has follow up scheduled with PCP in one week and has HFU appt with urology on 22. Contact information given. Patient has no other concerns or needs at this time. Was this an external facility discharge? No Discharge Facility: [address] [place] [address] to be reviewed by the provider   Additional needs identified to be addressed with provider: No  none             Method of communication with provider : none    Advance Care Planning:   Does patient have an Advance Directive: reviewed and current. Care Transition Nurse contacted the patient by telephone to perform post hospital discharge assessment. Verified name and  with patient as identifiers. Provided introduction to self, and explanation of the CTN role.      CTN reviewed discharge instructions, medical action plan and red flags with patient who verbalized understanding. Patient given an opportunity to ask questions and does not have any further questions or concerns at this time. Were discharge instructions available to patient? Yes. Reviewed appropriate site of care based on symptoms and resources available to patient including: PCP  Specialist  When to call 911. The patient agrees to contact the PCP office for questions related to their healthcare. Medication reconciliation was performed with patient, who verbalizes understanding of administration of home medications. Advised obtaining a 90-day supply of all daily and as-needed medications. Was patient discharged with a pulse oximeter? no    CTN provided contact information. No further follow-up call indicated based on severity of symptoms and risk factors. [facility] Physician. Transitions of Care Initial Call    Non-face-to-face services provided:  Obtained and reviewed discharge summary and/or continuity of care documents    Care Transitions 24 Hour Call    Schedule Follow Up Appointment with PCP: Completed  Do you have a copy of your discharge instructions?: Yes  Do you have all of your prescriptions and are they filled?: Yes  Have you been contacted by a [facility] Pharmacist?: No  Have you scheduled your follow up appointment?: Yes  How are you going to get to your appointment?: Car - drive self  Do you feel like you have everything you need to keep you well at home?: Yes  Care Transitions Interventions  No Identified Needs         Follow Up  No future appointments.     Shazia Schroeder LPN

## 2022-09-01 ENCOUNTER — PREP FOR PROCEDURE (OUTPATIENT)
Dept: UROLOGY | Age: 77
End: 2022-09-01

## 2022-09-01 RX ORDER — SODIUM CHLORIDE 0.9 % (FLUSH) 0.9 %
5-40 SYRINGE (ML) INJECTION EVERY 12 HOURS SCHEDULED
Status: CANCELLED | OUTPATIENT
Start: 2022-09-01

## 2022-09-01 RX ORDER — SODIUM CHLORIDE 9 MG/ML
INJECTION, SOLUTION INTRAVENOUS PRN
Status: CANCELLED | OUTPATIENT
Start: 2022-09-01

## 2022-09-01 RX ORDER — SODIUM CHLORIDE 9 MG/ML
INJECTION, SOLUTION INTRAVENOUS CONTINUOUS
Status: CANCELLED | OUTPATIENT
Start: 2022-09-01

## 2022-09-01 RX ORDER — SODIUM CHLORIDE 0.9 % (FLUSH) 0.9 %
5-40 SYRINGE (ML) INJECTION PRN
Status: CANCELLED | OUTPATIENT
Start: 2022-09-01

## 2022-09-01 NOTE — PROGRESS NOTES
[facility] PRE-ADMISSION TESTING INSTRUCTIONS    The Preadmission Testing patient is instructed accordingly using the following criteria (check applicable):    ARRIVAL INSTRUCTIONS:  [x] Parking the day of Surgery is located in the Main Entrance lot. Upon entering the door, make an immediate right to the surgery reception desk    [x] Bring photo ID and insurance card    [] Bring in a copy of Living will or Durable Power of  papers. [x] Please be sure to arrange for responsible adult to provide transportation to and from the hospital    [x] Please arrange for responsible adult to be with you for the 24 hour period post procedure due to having anesthesia    [x] If you awake am of surgery not feeling well or have temperature >100 please call [phone]    GENERAL INSTRUCTIONS:    [x] Nothing by mouth after midnight, including gum, candy, mints or water    [x] You may brush your teeth, but do not swallow any water    [x] Take medications as instructed with 1-2 oz of water    [x] Stop herbal supplements and vitamins 5 days prior to procedure    [x] Follow preop dosing of blood thinners per physician instructions    [] Take 1/2 dose of evening insulin, but no insulin after midnight    [] No oral diabetic medications after midnight    [] If diabetic and have low blood sugar or feel symptomatic, take 1-2oz apple juice only    [] Bring inhalers day of surgery    [] Bring C-PAP/ Bi-Pap day of surgery    [] Bring urine specimen day of surgery    [x] Shower or bath with soap, lather and rinse well, AM of Surgery, no lotion, powders or creams to surgical site    [] Follow bowel prep as instructed per surgeon    [x] No tobacco products within 24 hours of surgery     [x] No alcohol or illegal drug use within 24 hours of surgery.     [x] Jewelry, body piercing's, eyeglasses, contact lenses and dentures are not permitted into surgery (bring cases)      [x] Please do not wear any nail polish, make up or hair products on the day of surgery    [x] You can expect a call the business day prior to procedure to notify you if your arrival time changes    [x] If you receive a survey after surgery we would greatly appreciate your comments    [] Parent/guardian of a minor must accompany their child and remain on the premises  the entire time they are under our care     [] Pediatric patients may bring favorite toy, blanket or comfort item with them    [] A caregiver or family member must remain with the patient during their stay if they are mentally handicapped, have dementia, disoriented or unable to use a call light or would be a safety concern if left unattended    [x] Please notify surgeon if you develop any illness between now and time of surgery (cold, cough, sore throat, fever, nausea, vomiting) or any signs of infections  including skin, wounds, and dental.    [x]  The Outpatient Pharmacy is available to fill your prescription here on your day of surgery, ask your preop nurse for details    [] Other instructions    EDUCATIONAL MATERIALS PROVIDED:    [] PAT Preoperative Education Packet/Booklet     [] Medication List    [] Transfusion bracelet applied with instructions    [] Shower with soap, lather and rinse well, and use CHG wipes provided the evening before surgery as instructed    [] Incentive spirometer with instructions

## 2022-09-01 NOTE — PROGRESS NOTES
Per [facility] at Dr. Nii Jim office, patient does not need medical clearance prior to procedure.

## 2022-09-02 ENCOUNTER — ANESTHESIA EVENT (OUTPATIENT)
Dept: OPERATING ROOM | Age: 77
End: 2022-09-02
Payer: MEDICARE

## 2022-09-02 ENCOUNTER — ANESTHESIA (OUTPATIENT)
Dept: OPERATING ROOM | Age: 77
End: 2022-09-02
Payer: MEDICARE

## 2022-09-02 ENCOUNTER — HOSPITAL ENCOUNTER (OUTPATIENT)
Age: 77
Setting detail: OUTPATIENT SURGERY
Discharge: HOME | End: 2022-09-02
Attending: UROLOGY | Admitting: UROLOGY
Payer: MEDICARE

## 2022-09-02 ENCOUNTER — HOSPITAL ENCOUNTER (OUTPATIENT)
Dept: GENERAL RADIOLOGY | Age: 77
Setting detail: OUTPATIENT SURGERY
Discharge: HOME | End: 2022-09-04
Attending: UROLOGY
Payer: MEDICARE

## 2022-09-02 VITALS
HEIGHT: 70 IN | OXYGEN SATURATION: 96 % | TEMPERATURE: 97.1 F | HEART RATE: 77 BPM | DIASTOLIC BLOOD PRESSURE: 75 MMHG | RESPIRATION RATE: 16 BRPM | SYSTOLIC BLOOD PRESSURE: 122 MMHG | BODY MASS INDEX: 31.5 KG/M2 | WEIGHT: 220 LBS

## 2022-09-02 DIAGNOSIS — R52 PAIN: ICD-10-CM

## 2022-09-02 PROBLEM — I10 ESSENTIAL HYPERTENSION: Status: RESOLVED | Noted: 2020-12-07 | Resolved: 2022-09-02

## 2022-09-02 PROCEDURE — 3700000000 HC ANESTHESIA ATTENDED CARE: Performed by: UROLOGY

## 2022-09-02 PROCEDURE — 7100000010 HC PHASE II RECOVERY - FIRST 15 MIN: Performed by: UROLOGY

## 2022-09-02 PROCEDURE — 6360000002 HC RX W HCPCS: Performed by: NURSE ANESTHETIST, CERTIFIED REGISTERED

## 2022-09-02 PROCEDURE — 2709999900 HC NON-CHARGEABLE SUPPLY: Performed by: UROLOGY

## 2022-09-02 PROCEDURE — 2500000003 HC RX 250 WO HCPCS: Performed by: NURSE ANESTHETIST, CERTIFIED REGISTERED

## 2022-09-02 PROCEDURE — 74420 UROGRAPHY RTRGR +-KUB: CPT

## 2022-09-02 PROCEDURE — C1769 GUIDE WIRE: HCPCS | Performed by: UROLOGY

## 2022-09-02 PROCEDURE — 3600000012 HC SURGERY LEVEL 2 ADDTL 15MIN: Performed by: UROLOGY

## 2022-09-02 PROCEDURE — 7100000011 HC PHASE II RECOVERY - ADDTL 15 MIN: Performed by: UROLOGY

## 2022-09-02 PROCEDURE — 2720000010 HC SURG SUPPLY STERILE: Performed by: UROLOGY

## 2022-09-02 PROCEDURE — 6360000002 HC RX W HCPCS: Performed by: NURSE PRACTITIONER

## 2022-09-02 PROCEDURE — 6370000000 HC RX 637 (ALT 250 FOR IP): Performed by: ANESTHESIOLOGY

## 2022-09-02 PROCEDURE — 3700000001 HC ADD 15 MINUTES (ANESTHESIA): Performed by: UROLOGY

## 2022-09-02 PROCEDURE — 2580000003 HC RX 258: Performed by: NURSE PRACTITIONER

## 2022-09-02 PROCEDURE — 3600000002 HC SURGERY LEVEL 2 BASE: Performed by: UROLOGY

## 2022-09-02 RX ORDER — GLYCOPYRROLATE 0.2 MG/ML
INJECTION INTRAMUSCULAR; INTRAVENOUS PRN
Status: DISCONTINUED | OUTPATIENT
Start: 2022-09-02 | End: 2022-09-02 | Stop reason: SDUPTHER

## 2022-09-02 RX ORDER — PROPOFOL 10 MG/ML
INJECTION, EMULSION INTRAVENOUS PRN
Status: DISCONTINUED | OUTPATIENT
Start: 2022-09-02 | End: 2022-09-02 | Stop reason: SDUPTHER

## 2022-09-02 RX ORDER — NITROFURANTOIN 25; 75 MG/1; MG/1
100 CAPSULE ORAL 2 TIMES DAILY
Qty: 10 CAPSULE | Refills: 0 | Status: SHIPPED | OUTPATIENT
Start: 2022-09-02 | End: 2022-09-07

## 2022-09-02 RX ORDER — LIDOCAINE HYDROCHLORIDE 20 MG/ML
INJECTION, SOLUTION EPIDURAL; INFILTRATION; INTRACAUDAL; PERINEURAL PRN
Status: DISCONTINUED | OUTPATIENT
Start: 2022-09-02 | End: 2022-09-02 | Stop reason: SDUPTHER

## 2022-09-02 RX ORDER — SODIUM CHLORIDE 0.9 % (FLUSH) 0.9 %
5-40 SYRINGE (ML) INJECTION PRN
Status: DISCONTINUED | OUTPATIENT
Start: 2022-09-02 | End: 2022-09-02 | Stop reason: HOSPADM

## 2022-09-02 RX ORDER — FENTANYL CITRATE 50 UG/ML
INJECTION, SOLUTION INTRAMUSCULAR; INTRAVENOUS PRN
Status: DISCONTINUED | OUTPATIENT
Start: 2022-09-02 | End: 2022-09-02 | Stop reason: SDUPTHER

## 2022-09-02 RX ORDER — SODIUM CHLORIDE 9 MG/ML
INJECTION, SOLUTION INTRAVENOUS PRN
Status: DISCONTINUED | OUTPATIENT
Start: 2022-09-02 | End: 2022-09-02 | Stop reason: HOSPADM

## 2022-09-02 RX ORDER — SODIUM CHLORIDE 9 MG/ML
INJECTION, SOLUTION INTRAVENOUS CONTINUOUS
Status: DISCONTINUED | OUTPATIENT
Start: 2022-09-02 | End: 2022-09-02 | Stop reason: HOSPADM

## 2022-09-02 RX ORDER — SODIUM CHLORIDE 0.9 % (FLUSH) 0.9 %
5-40 SYRINGE (ML) INJECTION EVERY 12 HOURS SCHEDULED
Status: DISCONTINUED | OUTPATIENT
Start: 2022-09-02 | End: 2022-09-02 | Stop reason: HOSPADM

## 2022-09-02 RX ORDER — HYDROCODONE BITARTRATE AND ACETAMINOPHEN 5; 325 MG/1; MG/1
1 TABLET ORAL ONCE
Status: COMPLETED | OUTPATIENT
Start: 2022-09-02 | End: 2022-09-02

## 2022-09-02 RX ADMIN — LIDOCAINE HYDROCHLORIDE 50 MG: 20 INJECTION, SOLUTION EPIDURAL; INFILTRATION; INTRACAUDAL; PERINEURAL at 15:56

## 2022-09-02 RX ADMIN — PROPOFOL 75 MCG/KG/MIN: 10 INJECTION, EMULSION INTRAVENOUS at 15:58

## 2022-09-02 RX ADMIN — SODIUM CHLORIDE: 9 INJECTION, SOLUTION INTRAVENOUS at 15:53

## 2022-09-02 RX ADMIN — HYDROCODONE BITARTRATE AND ACETAMINOPHEN 1 TABLET: 5; 325 TABLET ORAL at 17:20

## 2022-09-02 RX ADMIN — FENTANYL CITRATE 50 MCG: 50 INJECTION, SOLUTION INTRAMUSCULAR; INTRAVENOUS at 15:56

## 2022-09-02 RX ADMIN — GLYCOPYRROLATE 0.1 MG: 0.2 INJECTION, SOLUTION INTRAMUSCULAR; INTRAVENOUS at 15:56

## 2022-09-02 RX ADMIN — FENTANYL CITRATE 25 MCG: 50 INJECTION, SOLUTION INTRAMUSCULAR; INTRAVENOUS at 15:59

## 2022-09-02 RX ADMIN — CEFAZOLIN 2000 MG: 2 INJECTION, POWDER, FOR SOLUTION INTRAMUSCULAR; INTRAVENOUS at 15:56

## 2022-09-02 RX ADMIN — PROPOFOL 50 MG: 10 INJECTION, EMULSION INTRAVENOUS at 15:57

## 2022-09-02 RX ADMIN — FENTANYL CITRATE 25 MCG: 50 INJECTION, SOLUTION INTRAMUSCULAR; INTRAVENOUS at 16:05

## 2022-09-02 ASSESSMENT — PAIN DESCRIPTION - LOCATION: LOCATION: PENIS

## 2022-09-02 ASSESSMENT — PAIN SCALES - GENERAL: PAINLEVEL_OUTOF10: 7

## 2022-09-02 ASSESSMENT — LIFESTYLE VARIABLES: SMOKING_STATUS: 0

## 2022-09-02 ASSESSMENT — PAIN DESCRIPTION - DESCRIPTORS: DESCRIPTORS: DISCOMFORT

## 2022-09-02 NOTE — ANESTHESIA PRE PROCEDURE
chloride flush 0.9 % injection 5-40 mL  5-40 mL IntraVENous PRN David Ruelas APRN - CNP        0.9 % sodium chloride infusion   IntraVENous PRN David Ruelas APRN - CNP        ceFAZolin (ANCEF) 2,000 mg in sterile water 20 mL IV syringe  2,000 mg IntraVENous On Call to [address], APRN - CNP           Allergies:     Allergies   Allergen Reactions    Adhesive Tape     Cipro [Ciprofloxacin Hcl] Hives       Problem List:    Patient Active Problem List   Diagnosis Code    Chest pain R07.9    Anemia D64.9    Leukocytosis D72.829    Essential hypertension I10    History of rectal cancer Z85.048    NSTEMI (non-ST elevated myocardial infarction) ([facility]) I21.4    Coronary artery disease involving native coronary artery of native heart without angina pectoris I25.10    Pure hypercholesterolemia E78.00    BPH with urinary obstruction N40.1, N13.8    MAYRA (acute kidney injury) ([facility]) N17.9    Hematuria R31.9    Acute urinary retention R33.8       Past Medical History:        Diagnosis Date    CAD (coronary artery disease)     S/P CABG follows with PCP    HTN (hypertension)     Hyperlipidemia     NSTEMI (non-ST elevated myocardial infarction) ([facility] [address]     Rectal cancer ([facility])     Tobacco abuse, in remission        Past Surgical History:        Procedure Laterality Date    BLADDER SURGERY N/A 6/28/2021    CYSTOSCOPY RETROGRADE PYELOGRAM, CLOT EVACUATION performed by Rissa Barboza MD at [address] [place] [address] GRAFT N/A 12/10/2020    CORONARY ARTERY BYPASS, BELINDA performed by Candelaria Willams DO at [address] [place] [address] Left     RECTAL SURGERY      TURP N/A 2/12/2021    CYSTOSCOPY RETROGRADE PYELOGRAM PLASMA LOOP TRANSURETHRAL RESECTION PROSTATE performed by Ben Ramirez DO at [facility] OR       Social History:    Social History     Tobacco Use    Smoking status: Former     Packs/day: 1.00     Years: 33.00     Pack years: 33.00 Types: Cigarettes     Quit date: 2000     Years since quittin.6    Smokeless tobacco: Never   Substance Use Topics    Alcohol use: Yes     Alcohol/week: 2.0 standard drinks     Types: 2 Cans of beer per week     Comment: occasional                                Counseling given: Not Answered      Vital Signs (Current): There were no vitals filed for this visit. BP Readings from Last 3 Encounters:   22 129/60   22 132/72   22 131/62       NPO Status:                                                                                 BMI:   Wt Readings from Last 3 Encounters:   22 220 lb (99.8 kg)   22 230 lb (104.3 kg)   21 194 lb 1.6 oz (88 kg)     There is no height or weight on file to calculate BMI.    CBC:   Lab Results   Component Value Date/Time    WBC 9.3 2022 04:53 AM    RBC 3.02 2022 04:53 AM    HGB 9.0 2022 04:53 AM    HCT 28.8 2022 04:53 AM    MCV 95.4 2022 04:53 AM    RDW 13.0 2022 04:53 AM     2022 04:53 AM       CMP:   Lab Results   Component Value Date/Time     2022 04:53 AM    K 3.7 2022 04:53 AM    K 4.1 2022 09:27 AM     2022 04:53 AM    CO2 23 2022 04:53 AM    BUN 14 2022 04:53 AM    CREATININE 0.9 2022 04:53 AM    GFRAA >60 2022 04:53 AM    LABGLOM >60 2022 04:53 AM    GLUCOSE 99 2022 04:53 AM    PROT 6.2 2022 09:27 AM    CALCIUM 8.6 2022 04:53 AM    BILITOT 0.5 2022 09:27 AM    ALKPHOS 102 2022 09:27 AM    AST 16 2022 09:27 AM    ALT 14 2022 09:27 AM       POC Tests: No results for input(s): POCGLU, POCNA, POCK, POCCL, POCBUN, POCHEMO, POCHCT in the last 72 hours.     Coags:   Lab Results   Component Value Date/Time    PROTIME 17.6 12/10/2020 02:58 PM    INR 1.5 12/10/2020 02:58 PM    APTT 28.1 12/10/2020 02:58 PM       HCG (If Applicable): No results Prophylactic antiemetics administered. Anesthetic plan and risks discussed with patient. Plan discussed with CRNA.                 Jelly Mallory MD   9/2/2022    NOTE REVIEWED AND AGREE WITH PLan ARGENTINA Barron - CRNA

## 2022-09-02 NOTE — BRIEF OP NOTE
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

## 2022-09-02 NOTE — ANESTHESIA POSTPROCEDURE EVALUATION
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     Juan Antonio Phase I:      Juan Antonio Phase II:        Anesthesia Post Evaluation    Patient location during evaluation: bedside  Patient participation: complete - patient participated  Level of consciousness: awake and alert  Airway patency: patent  Nausea & Vomiting: no nausea and no vomiting  Complications: no  Cardiovascular status: blood pressure returned to baseline  Respiratory status: acceptable  Hydration status: euvolemic

## 2022-09-02 NOTE — DISCHARGE INSTRUCTIONS
Follow up with [facility] (Dr. Hi Castillo) in 2- 4 [phone]. Place the patients cheney to leg bag on discharge from the hospital.  Please give the patient a night bag (large bag) and cheney instructions upon discharge. Please have the patient contact the office for cheney removal instructions. The catheter may go red (hematuria), may go clear, and may go red. This is a normal process, as long as the catheter is draining. Call the office if any concerns should arise for further instructions, [phone].

## 2022-09-03 NOTE — OP NOTE
[address] [address]                                OPERATIVE REPORT    PATIENT NAME: Emery Luis                    :        1945  MED REC NO:   [id number]                            ROOM:  ACCOUNT NO:   [de-identified]                           ADMIT DATE: 2022  PROVIDER:     Cristhian Ramirez DO    DATE OF PROCEDURE:  2022    PREOPERATIVE DIAGNOSES:  1. History of BPH, status post TURP. 2.  History of an anal cancer, status post chemo and radiation. 3.  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 4.  Recent urethral stricture. POSTOPERATIVE DIAGNOSES:  1. History of BPH, status post TURP. 2.  History of an anal cancer, status post chemo and radiation. 3.  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 4.  Recent urethral stricture. PROCEDURE PERFORMED:  The patient had:  1. Cystoscopy with a flexible scope. 2.  Digital rectal exam under anesthesia. 3.  Urethral dilatation. 4.  Estrada catheter placement. 5.  Cystogram.    SURGEON:  Cole Ramirez DO.    ANESTHESIA:  Preoperative anesthesia was given. ANTIBIOTICS:  Preoperative antibiotics were given. Estrada catheter was left. STORY ON THIS PATIENT:  This 59-year-old male known to me that has a  history of enlarged prostate. He has been having substantial lower  urinary tract symptoms. He did have urodynamics recently performed that  showed detrusor overactivity. The patient does have a TURP. He was  just in the office and had attempted cysto yesterday. He had a  pendulous urethral stricture and ultimately the patient was put on today  for urethral dilatation. The risks, the benefits, and the alternatives  of the proposed surgical procedure were extensively explained. He  understood the risks, the benefits, and the alternatives and elected to  proceed.     DESCRIPTION OF PROCEDURE:  This pleasant 70-year-old  male was  brought to the operating room #6 at [address] He was placed in the supine position and induced with  monitored anesthesia. Anesthesia monitored the head and neck area, IV  access, and vital signs throughout the course of the case. Status post  induction, he was placed in the dorsal lithotomy position. All  underlying points were pressure padded. SCDs were applied. He was  prepped and draped in normal sterile fashion. He has no meatal  stenosis, but I was able to get the scope in, but about 3 cm in, I was  then unable to do any additional progression of the scope. I did  subsequently place a wire _____ this point and then with the urethral  dilators, I was able to systematically dilate his urethra up to about  20-Sing[place]an. I then took a rigid scope, but only I can go to the bulbar  region. The effects of the radiation have caused him to have  substantial rigidity to the pelvis and tissue. I was unable to get this  into the bladder. I did do a TURP on him about a year ago and the  tissue has changed in my opinion since then. Ultimately, I took a  flexible scope, got into the bladder, minimal visualization. He did  have reduced bladder capacity. Ultimately, I was able to get a Estrada  catheter in over the 0.035 Glidewire which remained. It was 20-Singaporean  in nature with 10 mL in the balloon. After this occurred, I did shot a  cystogram to confirm it was appropriately positioned, which it was. I  did do a digital rectal exam.  It was again very abnormal tissue  postradiation. He awoke from anesthesia without complication and  brought to the PACU in a stable condition. PLAN:  1.  I am going to keep the catheter in for a week. 2.  Antibiotics were written. 3.  No intraoperative complications. 4.  He still potentially may need Botox in the future.   5.  I do feel a majority of this is as a result of the treatment for the  anal No cancer which he has.         Frances Charles DO    D: 09/02/2022 16:41:32       T: 09/03/2022 2:43:58     [email or web address]  Job#: [id number]     Doc#: [id number]    CC:    _____

## 2022-12-08 ENCOUNTER — HOSPITAL ENCOUNTER (EMERGENCY)
Age: 77
Discharge: HOME | End: 2022-12-09
Attending: EMERGENCY MEDICINE
Payer: MEDICARE

## 2022-12-08 ENCOUNTER — APPOINTMENT (OUTPATIENT)
Dept: CT IMAGING | Age: 77
End: 2022-12-08
Payer: MEDICARE

## 2022-12-08 VITALS
SYSTOLIC BLOOD PRESSURE: 122 MMHG | OXYGEN SATURATION: 93 % | TEMPERATURE: 97.1 F | HEART RATE: 96 BPM | DIASTOLIC BLOOD PRESSURE: 82 MMHG | RESPIRATION RATE: 18 BRPM

## 2022-12-08 DIAGNOSIS — N30.01 ACUTE CYSTITIS WITH HEMATURIA: ICD-10-CM

## 2022-12-08 DIAGNOSIS — R33.9 URINARY RETENTION: Primary | ICD-10-CM

## 2022-12-08 DIAGNOSIS — R31.9 HEMATURIA, UNSPECIFIED TYPE: ICD-10-CM

## 2022-12-08 LAB
ALBUMIN SERPL-MCNC: 4 G/DL (ref 3.5–5.2)
ALP BLD-CCNC: 124 U/L (ref 40–129)
ALT SERPL-CCNC: 11 U/L (ref 0–40)
ANION GAP SERPL CALCULATED.3IONS-SCNC: 12 MMOL/L (ref 7–16)
AST SERPL-CCNC: 15 U/L (ref 0–39)
BACTERIA: ABNORMAL /HPF
BASOPHILS ABSOLUTE: 0.06 E9/L (ref 0–0.2)
BASOPHILS RELATIVE PERCENT: 0.4 % (ref 0–2)
BILIRUB SERPL-MCNC: 0.4 MG/DL (ref 0–1.2)
BILIRUBIN URINE: ABNORMAL
BLOOD, URINE: ABNORMAL
BUN BLDV-MCNC: 28 MG/DL (ref 6–23)
CALCIUM SERPL-MCNC: 9.7 MG/DL (ref 8.6–10.2)
CHLORIDE BLD-SCNC: 107 MMOL/L (ref 98–107)
CLARITY: ABNORMAL
CO2: 21 MMOL/L (ref 22–29)
COLOR: ABNORMAL
CREAT SERPL-MCNC: 1.2 MG/DL (ref 0.7–1.2)
EOSINOPHILS ABSOLUTE: 0.01 E9/L (ref 0.05–0.5)
EOSINOPHILS RELATIVE PERCENT: 0.1 % (ref 0–6)
GFR SERPL CREATININE-BSD FRML MDRD: >60 ML/MIN/1.73
GLUCOSE BLD-MCNC: 164 MG/DL (ref 74–99)
GLUCOSE URINE: NEGATIVE MG/DL
HCT VFR BLD CALC: 33.9 % (ref 37–54)
HEMOGLOBIN: 11 G/DL (ref 12.5–16.5)
IMMATURE GRANULOCYTES #: 0.06 E9/L
IMMATURE GRANULOCYTES %: 0.4 % (ref 0–5)
KETONES, URINE: NEGATIVE MG/DL
LEUKOCYTE ESTERASE, URINE: ABNORMAL
LYMPHOCYTES ABSOLUTE: 0.95 E9/L (ref 1.5–4)
LYMPHOCYTES RELATIVE PERCENT: 6 % (ref 20–42)
MCH RBC QN AUTO: 28.9 PG (ref 26–35)
MCHC RBC AUTO-ENTMCNC: 32.4 % (ref 32–34.5)
MCV RBC AUTO: 89 FL (ref 80–99.9)
MONOCYTES ABSOLUTE: 0.96 E9/L (ref 0.1–0.95)
MONOCYTES RELATIVE PERCENT: 6 % (ref 2–12)
NEUTROPHILS ABSOLUTE: 13.91 E9/L (ref 1.8–7.3)
NEUTROPHILS RELATIVE PERCENT: 87.1 % (ref 43–80)
NITRITE, URINE: POSITIVE
PDW BLD-RTO: 13.8 FL (ref 11.5–15)
PH UA: 6.5 (ref 5–9)
PLATELET # BLD: 288 E9/L (ref 130–450)
PMV BLD AUTO: 10.4 FL (ref 7–12)
POTASSIUM SERPL-SCNC: 4.2 MMOL/L (ref 3.5–5)
PROTEIN UA: >=300 MG/DL
RBC # BLD: 3.81 E12/L (ref 3.8–5.8)
RBC UA: ABNORMAL /HPF (ref 0–2)
SODIUM BLD-SCNC: 140 MMOL/L (ref 132–146)
SPECIFIC GRAVITY UA: 1.02 (ref 1–1.03)
TOTAL PROTEIN: 7.4 G/DL (ref 6.4–8.3)
UROBILINOGEN, URINE: 0.2 E.U./DL
WBC # BLD: 16 E9/L (ref 4.5–11.5)
WBC UA: ABNORMAL /HPF (ref 0–5)

## 2022-12-08 PROCEDURE — 81001 URINALYSIS AUTO W/SCOPE: CPT

## 2022-12-08 PROCEDURE — 96375 TX/PRO/DX INJ NEW DRUG ADDON: CPT

## 2022-12-08 PROCEDURE — 6360000004 HC RX CONTRAST MEDICATION: Performed by: RADIOLOGY

## 2022-12-08 PROCEDURE — 99285 EMERGENCY DEPT VISIT HI MDM: CPT

## 2022-12-08 PROCEDURE — 96374 THER/PROPH/DIAG INJ IV PUSH: CPT

## 2022-12-08 PROCEDURE — 74177 CT ABD & PELVIS W/CONTRAST: CPT

## 2022-12-08 PROCEDURE — 80053 COMPREHEN METABOLIC PANEL: CPT

## 2022-12-08 PROCEDURE — 2580000003 HC RX 258: Performed by: STUDENT IN AN ORGANIZED HEALTH CARE EDUCATION/TRAINING PROGRAM

## 2022-12-08 PROCEDURE — 6370000000 HC RX 637 (ALT 250 FOR IP): Performed by: STUDENT IN AN ORGANIZED HEALTH CARE EDUCATION/TRAINING PROGRAM

## 2022-12-08 PROCEDURE — 87088 URINE BACTERIA CULTURE: CPT

## 2022-12-08 PROCEDURE — 51702 INSERT TEMP BLADDER CATH: CPT

## 2022-12-08 PROCEDURE — 85025 COMPLETE CBC W/AUTO DIFF WBC: CPT

## 2022-12-08 PROCEDURE — 6360000002 HC RX W HCPCS: Performed by: STUDENT IN AN ORGANIZED HEALTH CARE EDUCATION/TRAINING PROGRAM

## 2022-12-08 PROCEDURE — 51798 US URINE CAPACITY MEASURE: CPT

## 2022-12-08 RX ORDER — ONDANSETRON 2 MG/ML
4 INJECTION INTRAMUSCULAR; INTRAVENOUS ONCE
Status: COMPLETED | OUTPATIENT
Start: 2022-12-08 | End: 2022-12-08

## 2022-12-08 RX ORDER — LIDOCAINE HYDROCHLORIDE 20 MG/ML
JELLY TOPICAL ONCE
Status: COMPLETED | OUTPATIENT
Start: 2022-12-08 | End: 2022-12-08

## 2022-12-08 RX ORDER — CEFDINIR 300 MG/1
300 CAPSULE ORAL 2 TIMES DAILY
Qty: 14 CAPSULE | Refills: 0 | Status: SHIPPED | OUTPATIENT
Start: 2022-12-08 | End: 2022-12-15

## 2022-12-08 RX ORDER — FENTANYL CITRATE 0.05 MG/ML
25 INJECTION, SOLUTION INTRAMUSCULAR; INTRAVENOUS ONCE
Status: COMPLETED | OUTPATIENT
Start: 2022-12-08 | End: 2022-12-08

## 2022-12-08 RX ADMIN — FENTANYL CITRATE 25 MCG: 50 INJECTION INTRAMUSCULAR; INTRAVENOUS at 22:07

## 2022-12-08 RX ADMIN — LIDOCAINE HYDROCHLORIDE: 20 JELLY TOPICAL at 19:36

## 2022-12-08 RX ADMIN — IOPAMIDOL 70 ML: 755 INJECTION, SOLUTION INTRAVENOUS at 21:17

## 2022-12-08 RX ADMIN — ONDANSETRON 4 MG: 2 INJECTION INTRAMUSCULAR; INTRAVENOUS at 18:30

## 2022-12-08 RX ADMIN — CEFTRIAXONE 2000 MG: 2 INJECTION, POWDER, FOR SOLUTION INTRAMUSCULAR; INTRAVENOUS at 21:33

## 2022-12-08 ASSESSMENT — ENCOUNTER SYMPTOMS
VOICE CHANGE: 0
PHOTOPHOBIA: 0
VOMITING: 0
RHINORRHEA: 0
COUGH: 0
DIARRHEA: 0
NAUSEA: 0
TROUBLE SWALLOWING: 0
ABDOMINAL PAIN: 1
SHORTNESS OF BREATH: 0

## 2022-12-08 ASSESSMENT — PAIN DESCRIPTION - DESCRIPTORS: DESCRIPTORS: PRESSURE

## 2022-12-08 ASSESSMENT — PAIN SCALES - GENERAL
PAINLEVEL_OUTOF10: 10
PAINLEVEL_OUTOF10: 7

## 2022-12-08 ASSESSMENT — PAIN DESCRIPTION - ORIENTATION: ORIENTATION: LOWER

## 2022-12-08 ASSESSMENT — PAIN - FUNCTIONAL ASSESSMENT: PAIN_FUNCTIONAL_ASSESSMENT: 0-10

## 2022-12-08 ASSESSMENT — PAIN DESCRIPTION - LOCATION
LOCATION: PELVIS
LOCATION: ABDOMEN

## 2022-12-08 NOTE — ED PROVIDER NOTES
HPI   Patient is a 77-year-old male with past medical history of hypertension, hyperlipidemia, CAD, rectal cancer and BPH presenting to the emergency department due to urinary retention. Patient states that his symptoms have been ongoing for 2 days. He reports similar symptoms in the past.  Patient follows with Dr. Joey Shah for urology and was advised to come to the emergency department for evaluation. He also notes that he has been passing clots as well. Patient has lower abdominal discomfort but states its not painful unless he pushes on his abdomen. He is noting a pressure sensation that is constant and nonradiating. Nothing in particular makes it better or worse. It is mild in severity. Patient does state that he is slightly nauseous but denying any other symptoms. He has no fever, chills, nausea, vomiting, chest pain, shortness of breath, back pain, flank pain, headache, lightheadedness, syncope, constipation or diarrhea. His presenting symptoms are moderate with no remitting or exacerbating factors. On initial evaluation, patient is well-appearing and in no acute distress. He has no history of kidney stones or chronic kidney dysfunction. Review of Systems   Constitutional:  Negative for chills, fatigue and fever. HENT:  Negative for congestion, rhinorrhea, trouble swallowing and voice change. Eyes:  Negative for photophobia and visual disturbance. Respiratory:  Negative for cough and shortness of breath. Cardiovascular:  Negative for chest pain and palpitations. Gastrointestinal:  Positive for abdominal pain. Negative for diarrhea, nausea and vomiting. Suprapubic abdominal pressure and pain with palpation. Endocrine: Negative for polydipsia, polyphagia and polyuria. Genitourinary:  Positive for hematuria. Negative for dysuria and urgency. Urinary retention. Blood clots present with urination. Musculoskeletal:  Negative for arthralgias, myalgias and neck pain. Skin:  Negative for rash and wound. Neurological:  Negative for dizziness, weakness, numbness and headaches. Psychiatric/Behavioral:  Negative for behavioral problems and confusion. Physical Exam  Constitutional:       General: He is not in acute distress. Appearance: Normal appearance. He is not ill-appearing. HENT:      Head: Normocephalic and atraumatic. Right Ear: External ear normal.      Left Ear: External ear normal.      Nose: Nose normal. No congestion or rhinorrhea. Mouth/Throat:      Mouth: Mucous membranes are moist.      Pharynx: Oropharynx is clear. No oropharyngeal exudate or posterior oropharyngeal erythema. Eyes:      Conjunctiva/sclera: Conjunctivae normal.      Pupils: Pupils are equal, round, and reactive to light. Cardiovascular:      Rate and Rhythm: Normal rate and regular rhythm. Pulses: Normal pulses. Heart sounds: Normal heart sounds. Pulmonary:      Effort: Pulmonary effort is normal. No respiratory distress. Breath sounds: Normal breath sounds. No wheezing or rales. Abdominal:      General: Abdomen is flat. There is no distension. Palpations: Abdomen is soft. Tenderness: There is abdominal tenderness. There is no guarding or rebound. Comments: Mild to moderate tenderness to palpation in the suprapubic region with no rebound, guarding or rigidity. Abdomen otherwise soft and nondistended. Musculoskeletal:         General: No swelling. Cervical back: Normal range of motion and neck supple. No rigidity or tenderness. Right lower leg: No edema. Left lower leg: No edema. Skin:     General: Skin is warm and dry. Capillary Refill: Capillary refill takes less than 2 seconds. Neurological:      General: No focal deficit present. Mental Status: He is alert and oriented to person, place, and time. Cranial Nerves: No cranial nerve deficit.       Coordination: Coordination normal.   Psychiatric: Mood and Affect: Mood normal.         Behavior: Behavior normal.        MDM   Patient is a 72-year-old male with past medical history of hypertension, hyperlipidemia, CAD, rectal cancer and BPH presenting to the emergency department due to urinary retention. On initial evaluation, patient was nontoxic-appearing, afebrile, hemodynamically stable and in no acute distress. Physical exam remarkable for mild to moderate tenderness to palpation in the suprapubic region. Patient had no rebound, guarding or rigidity. Bladder scan was noting 501 mL. Three-way Estrada catheter was inserted by the patient's nurse and continuous bladder irrigation was started. Patient did have dark red urine output initially with small clots present. After 3 L of continuous irrigation, output began to slightly clear up and become lighter in color. Lab work-up in the emergency department is unremarkable. Renal function appears normal.  Creatinine is 1.2 and BUN is 28. Urinalysis does suggest acute cystitis. Patient also has a white blood cell count of 16. Patient was given 1 dose of IV Rocephin in the emergency department. CT abdomen pelvis obtained in generally unremarkable. There was mild bilateral hydronephrosis noted but no clear obstructive pathology. No other acute intra-abdominal processes were noted. Hemoglobin is stable and above patient's baseline. Patient was given fentanyl and Zofran in the ED as well for his symptoms. On reevaluation he was noting significant improvement in his symptoms. Work-up and results were discussed with the patient and he was agreeable to discharge with outpatient urology follow-up as we discussed. Estrada was not obstructed on recheck after continuous irrigation was stopped. Patient will be discharged with Estrada catheter and leg bag in place. He was given return precautions in case of any new or worsening symptoms. Patient discharged in stable condition.   He was provided with prescription for ELMO BARONE as well. ED Course as of 12/09/22 0049   Thu Dec 08, 2022   1821   ATTENDING PROVIDER ATTESTATION:     I have personally performed and/or participated in the history, exam, medical decision making, and procedures and agree with all pertinent clinical information unless otherwise noted. I have also reviewed and agree with the past medical, family and social history unless otherwise noted. I have discussed this patient in detail with the resident and provided the instruction and education regarding the evidence-based evaluation and treatment of hematuria. Any EKG that may have been performed has been personally reviewed by me and I agree with the documentation as noted by the resident. History: Patient presents to the ED with a complaint of difficulty urinating. Patient states he has been passing blood clots. Prior history of hematuria requiring catheter. Called his urologist and was advised to come here for further evaluation. Patient does report lower abdominal tenderness and fullness that radiates into his back. No fever or chills. No chest pain or shortness of breath. My findings: Jazmin Mckeon is a 78 y.o. male whom is in no distress. Physical exam reveals patient to be uncomfortable but in no distress. Mild tachycardia. No pallor or diaphoresis. He does have suprapubic distention and tenderness to palpation. No CVA tenderness. My plan: Symptomatic and supportive care. Appropriate labs. Electronically signed by Marvin Springer DO on 12/8/22 at 6:21 PM EST      [MS]   Naseem Chapa Nurse reports that the urine was dark red. She has been irrigating continuously with saline. [MS]   7124 Patient reevaluated. Continuous irrigation was stopped for 30 minutes. Estrada was assessed and did not have any signs of obstruction and was irrigating well. Discussed discharge with patient with Estrada and outpatient urology follow-up and he was agreeable with this plan.   Patient will be placed on leg bag and provided with larger Estrada bladder for nighttime. Nursing will instruct patient on how to switch from leg bag to larger bladder. Patient states that he actually already understands how to do this because he has been to the ED for the same symptoms multiple times. Patient follows with Dr. Eugene Tang and will see him as an outpatient. He was given return precautions in case of new or worsening symptoms. [PP]      ED Course User Index  [MS] David Stevenson   [PP] Braxtonkathi Wolfgang           --------------------------------------------- PAST HISTORY ---------------------------------------------  Past Medical History:  has a past medical history of CAD (coronary artery disease), HTN (hypertension), Hyperlipidemia, NSTEMI (non-ST elevated myocardial infarction) ([facility] [address] [facility] and Tobacco abuse, in remission. Past Surgical History:  has a past surgical history that includes Coronary artery bypass graft (N/A, 12/10/2020); Rectal surgery; Hemorrhoid surgery; Inguinal hernia repair (Left); Colonoscopy; TURP (N/A, 2/12/2021); Bladder surgery (N/A, 6/28/2021); and Cystoscopy (N/A, 9/2/2022). Social History:  reports that he quit smoking about 22 years ago. His smoking use included cigarettes. He has a 33.00 pack-year smoking history. He has never used smokeless tobacco. He reports current alcohol use of about 2.0 standard drinks per week. He reports that he does not use drugs. Family History: family history is not on file. The patients home medications have been reviewed.     Allergies: Adhesive tape and Cipro [ciprofloxacin hcl]    -------------------------------------------------- RESULTS -------------------------------------------------  Labs:  Results for orders placed or performed during the hospital encounter of 12/08/22   CMP   Result Value Ref Range    Sodium 140 132 - 146 mmol/L    Potassium 4.2 3.5 - 5.0 mmol/L    Chloride 107 98 - 107 mmol/L    CO2 21 (L) 22 - 29 mmol/L Anion Gap 12 7 - 16 mmol/L    Glucose 164 (H) 74 - 99 mg/dL    BUN 28 (H) 6 - 23 mg/dL    Creatinine 1.2 0.7 - 1.2 mg/dL    Est, Glom Filt Rate >60 >=60 mL/min/1.73    Calcium 9.7 8.6 - 10.2 mg/dL    Total Protein 7.4 6.4 - 8.3 g/dL    Albumin 4.0 3.5 - 5.2 g/dL    Total Bilirubin 0.4 0.0 - 1.2 mg/dL    Alkaline Phosphatase 124 40 - 129 U/L    ALT 11 0 - 40 U/L    AST 15 0 - 39 U/L   CBC with Auto Differential   Result Value Ref Range    WBC 16.0 (H) 4.5 - 11.5 E9/L    RBC 3.81 3.80 - 5.80 E12/L    Hemoglobin 11.0 (L) 12.5 - 16.5 g/dL    Hematocrit 33.9 (L) 37.0 - 54.0 %    MCV 89.0 80.0 - 99.9 fL    MCH 28.9 26.0 - 35.0 pg    MCHC 32.4 32.0 - 34.5 %    RDW 13.8 11.5 - 15.0 fL    Platelets 282 744 - 848 E9/L    MPV 10.4 7.0 - 12.0 fL    Neutrophils % 87.1 (H) 43.0 - 80.0 %    Immature Granulocytes % 0.4 0.0 - 5.0 %    Lymphocytes % 6.0 (L) 20.0 - 42.0 %    Monocytes % 6.0 2.0 - 12.0 %    Eosinophils % 0.1 0.0 - 6.0 %    Basophils % 0.4 0.0 - 2.0 %    Neutrophils Absolute 13.91 (H) 1.80 - 7.30 E9/L    Immature Granulocytes # 0.06 E9/L    Lymphocytes Absolute 0.95 (L) 1.50 - 4.00 E9/L    Monocytes Absolute 0.96 (H) 0.10 - 0.95 E9/L    Eosinophils Absolute 0.01 (L) 0.05 - 0.50 E9/L    Basophils Absolute 0.06 0.00 - 0.20 E9/L   Urinalysis with Microscopic   Result Value Ref Range    Color, UA RED (A) Straw/Yellow    Clarity, UA TURBID (A) Clear    Glucose, Ur Negative Negative mg/dL    Bilirubin Urine SMALL (A) Negative    Ketones, Urine Negative Negative mg/dL    Specific Gravity, UA 1.025 1.005 - 1.030    Blood, Urine LARGE (A) Negative    pH, UA 6.5 5.0 - 9.0    Protein, UA >=300 (A) Negative mg/dL    Urobilinogen, Urine 0.2 <2.0 E.U./dL    Nitrite, Urine POSITIVE (A) Negative    Leukocyte Esterase, Urine TRACE (A) Negative    WBC, UA 1-3 0 - 5 /HPF    RBC, UA PACKED 0 - 2 /HPF    Bacteria, UA MANY (A) None Seen /HPF       Radiology:  CT ABDOMEN PELVIS W IV CONTRAST Additional Contrast? None   Final Result Hyperattenuating material within the bladder, presumed blood products. Underlying lesion is not excluded. Mild bilateral hydroureteronephrosis. No radiopaque renal or ureteral stone.             ------------------------- NURSING NOTES AND VITALS REVIEWED ---------------------------  Date / Time Roomed:  12/8/2022  5:28 PM  ED Bed Assignment:  MASOUD/MASOUD    The nursing notes within the ED encounter and vital signs as below have been reviewed. /82   Pulse 96   Temp 97.1 °F (36.2 °C) (Infrared)   Resp 18   SpO2 93%   Oxygen Saturation Interpretation: Normal      ------------------------------------------ PROGRESS NOTES ------------------------------------------  I have spoken with the patient and discussed todays results, in addition to providing specific details for the plan of care and counseling regarding the diagnosis and prognosis. Their questions are answered at this time and they are agreeable with the plan. I discussed at length with them reasons for immediate return here for re evaluation. They will followup with primary care by calling their office tomorrow. --------------------------------- ADDITIONAL PROVIDER NOTES ---------------------------------  At this time the patient is without objective evidence of an acute process requiring hospitalization or inpatient management. They have remained hemodynamically stable throughout their entire ED visit and are stable for discharge with outpatient follow-up. The plan has been discussed in detail and they are aware of the specific conditions for emergent return, as well as the importance of follow-up. Discharge Medication List as of 12/8/2022 11:33 PM        START taking these medications    Details   cefdinir (OMNICEF) 300 MG capsule Take 1 capsule by mouth 2 times daily for 7 days, Disp-14 capsule, R-0Print             Diagnosis:  1. Urinary retention    2. Hematuria, unspecified type    3.  Acute cystitis with hematuria Disposition:  Patient's disposition: Discharge to home  Patient's condition is stable.             Kimberley Yun DO  Resident  12/09/22 DO Bryant  12/23/22 2031

## 2022-12-09 NOTE — ED NOTES
Bladder irrigation stopped per Dr. Sara Guerra.      Alayna Keating, [place]  12/08/22 8623

## 2022-12-09 NOTE — ED NOTES
Continous bladder irrigation started     Donna Pierson, [address] [facility]  12/08/22 2023

## 2022-12-09 NOTE — ED NOTES
Pt back from CT. 2nd bag of 3L complete. 3rd bag hung for bladder irrigation.       Angeline Velázquez RN  12/08/22 2355

## 2022-12-09 NOTE — ED NOTES
First bag of 3000mL irrigated. 2nd bag started. Intake and output charted in flowsheet.       Alayna Keating, [place]  12/08/22 2035

## 2022-12-11 LAB — URINE CULTURE, ROUTINE: NORMAL

## 2023-07-13 ENCOUNTER — HOSPITAL ENCOUNTER (OUTPATIENT)
Dept: ULTRASOUND IMAGING | Age: 78
Discharge: HOME | End: 2023-07-13
Attending: UROLOGY
Payer: MEDICARE

## 2023-07-13 DIAGNOSIS — N20.0 CALCULUS OF KIDNEY: ICD-10-CM

## 2023-07-13 PROCEDURE — 76770 US EXAM ABDO BACK WALL COMP: CPT

## 2024-10-30 ENCOUNTER — HOSPITAL ENCOUNTER (INPATIENT)
Age: 79
LOS: 3 days | Discharge: HOME | End: 2024-11-02
Attending: EMERGENCY MEDICINE | Admitting: INTERNAL MEDICINE
Payer: MEDICARE

## 2024-10-30 ENCOUNTER — APPOINTMENT (OUTPATIENT)
Dept: GENERAL RADIOLOGY | Age: 79
End: 2024-10-30
Payer: MEDICARE

## 2024-10-30 DIAGNOSIS — N17.9 ACUTE RENAL FAILURE, UNSPECIFIED ACUTE RENAL FAILURE TYPE (HCC): ICD-10-CM

## 2024-10-30 DIAGNOSIS — N30.01 ACUTE CYSTITIS WITH HEMATURIA: Primary | ICD-10-CM

## 2024-10-30 LAB
ALBUMIN SERPL-MCNC: 2.9 G/DL (ref 3.5–5.2)
ALP SERPL-CCNC: 144 U/L (ref 40–129)
ALT SERPL-CCNC: 22 U/L (ref 0–40)
ANION GAP SERPL CALCULATED.3IONS-SCNC: 12 MMOL/L (ref 7–16)
AST SERPL-CCNC: 27 U/L (ref 0–39)
BACTERIA URNS QL MICRO: ABNORMAL
BASOPHILS # BLD: 0 K/UL (ref 0–0.2)
BASOPHILS NFR BLD: 0 % (ref 0–2)
BILIRUB SERPL-MCNC: 0.4 MG/DL (ref 0–1.2)
BILIRUB UR QL STRIP: ABNORMAL
BNP SERPL-MCNC: 666 PG/ML (ref 0–450)
BUN SERPL-MCNC: 43 MG/DL (ref 6–23)
CALCIUM SERPL-MCNC: 9.3 MG/DL (ref 8.6–10.2)
CHLORIDE SERPL-SCNC: 99 MMOL/L (ref 98–107)
CLARITY UR: ABNORMAL
CO2 SERPL-SCNC: 22 MMOL/L (ref 22–29)
COLOR UR: YELLOW
CREAT SERPL-MCNC: 2.4 MG/DL (ref 0.7–1.2)
EOSINOPHIL # BLD: 0 K/UL (ref 0.05–0.5)
EOSINOPHILS RELATIVE PERCENT: 0 % (ref 0–6)
EPI CELLS #/AREA URNS HPF: ABNORMAL /HPF
ERYTHROCYTE [DISTWIDTH] IN BLOOD BY AUTOMATED COUNT: 14.4 % (ref 11.5–15)
GFR, ESTIMATED: 28 ML/MIN/1.73M2
GLUCOSE SERPL-MCNC: 95 MG/DL (ref 74–99)
GLUCOSE UR STRIP-MCNC: NEGATIVE MG/DL
HCT VFR BLD AUTO: 31.9 % (ref 37–54)
HGB BLD-MCNC: 10.5 G/DL (ref 12.5–16.5)
HGB UR QL STRIP.AUTO: ABNORMAL
KETONES UR STRIP-MCNC: 15 MG/DL
LACTATE BLDV-SCNC: 1.3 MMOL/L (ref 0.5–1.9)
LEUKOCYTE ESTERASE UR QL STRIP: ABNORMAL
LYMPHOCYTES NFR BLD: 0.45 K/UL (ref 1.5–4)
LYMPHOCYTES RELATIVE PERCENT: 3 % (ref 20–42)
MAGNESIUM SERPL-MCNC: 2.1 MG/DL (ref 1.6–2.6)
MCH RBC QN AUTO: 28.8 PG (ref 26–35)
MCHC RBC AUTO-ENTMCNC: 32.9 G/DL (ref 32–34.5)
MCV RBC AUTO: 87.6 FL (ref 80–99.9)
MONOCYTES NFR BLD: 1.78 K/UL (ref 0.1–0.95)
MONOCYTES NFR BLD: 10 % (ref 2–12)
NEUTROPHILS NFR BLD: 87 % (ref 43–80)
NEUTS SEG NFR BLD: 14.87 K/UL (ref 1.8–7.3)
NITRITE UR QL STRIP: NEGATIVE
PH UR STRIP: 6.5 [PH] (ref 5–9)
PLATELET # BLD AUTO: 229 K/UL (ref 130–450)
PMV BLD AUTO: 10.8 FL (ref 7–12)
POTASSIUM SERPL-SCNC: 4.9 MMOL/L (ref 3.5–5)
PROT SERPL-MCNC: 7.3 G/DL (ref 6.4–8.3)
PROT UR STRIP-MCNC: 100 MG/DL
RBC # BLD AUTO: 3.64 M/UL (ref 3.8–5.8)
RBC # BLD: ABNORMAL 10*6/UL
RBC #/AREA URNS HPF: ABNORMAL /HPF
SODIUM SERPL-SCNC: 133 MMOL/L (ref 132–146)
SP GR UR STRIP: 1.02 (ref 1–1.03)
T4 SERPL-MCNC: 3.8 UG/DL (ref 4.5–11.7)
TROPONIN I SERPL HS-MCNC: 19 NG/L (ref 0–11)
TROPONIN I SERPL HS-MCNC: 21 NG/L (ref 0–11)
TSH SERPL DL<=0.05 MIU/L-ACNC: 1.4 UIU/ML (ref 0.27–4.2)
UROBILINOGEN UR STRIP-ACNC: 0.2 EU/DL (ref 0–1)
WBC #/AREA URNS HPF: ABNORMAL /HPF
WBC OTHER # BLD: 17.1 K/UL (ref 4.5–11.5)

## 2024-10-30 PROCEDURE — 84443 ASSAY THYROID STIM HORMONE: CPT

## 2024-10-30 PROCEDURE — 83880 ASSAY OF NATRIURETIC PEPTIDE: CPT

## 2024-10-30 PROCEDURE — 2580000003 HC RX 258

## 2024-10-30 PROCEDURE — 85025 COMPLETE CBC W/AUTO DIFF WBC: CPT

## 2024-10-30 PROCEDURE — 84436 ASSAY OF TOTAL THYROXINE: CPT

## 2024-10-30 PROCEDURE — 51702 INSERT TEMP BLADDER CATH: CPT

## 2024-10-30 PROCEDURE — 1200000000 HC SEMI PRIVATE

## 2024-10-30 PROCEDURE — 84484 ASSAY OF TROPONIN QUANT: CPT

## 2024-10-30 PROCEDURE — 87077 CULTURE AEROBIC IDENTIFY: CPT

## 2024-10-30 PROCEDURE — 96374 THER/PROPH/DIAG INJ IV PUSH: CPT

## 2024-10-30 PROCEDURE — 6370000000 HC RX 637 (ALT 250 FOR IP)

## 2024-10-30 PROCEDURE — 99285 EMERGENCY DEPT VISIT HI MDM: CPT

## 2024-10-30 PROCEDURE — 6360000002 HC RX W HCPCS: Performed by: EMERGENCY MEDICINE

## 2024-10-30 PROCEDURE — 2580000003 HC RX 258: Performed by: EMERGENCY MEDICINE

## 2024-10-30 PROCEDURE — 80053 COMPREHEN METABOLIC PANEL: CPT

## 2024-10-30 PROCEDURE — 87040 BLOOD CULTURE FOR BACTERIA: CPT

## 2024-10-30 PROCEDURE — 81001 URINALYSIS AUTO W/SCOPE: CPT

## 2024-10-30 PROCEDURE — 83605 ASSAY OF LACTIC ACID: CPT

## 2024-10-30 PROCEDURE — 6370000000 HC RX 637 (ALT 250 FOR IP): Performed by: EMERGENCY MEDICINE

## 2024-10-30 PROCEDURE — 83735 ASSAY OF MAGNESIUM: CPT

## 2024-10-30 PROCEDURE — 87086 URINE CULTURE/COLONY COUNT: CPT

## 2024-10-30 PROCEDURE — 71046 X-RAY EXAM CHEST 2 VIEWS: CPT

## 2024-10-30 RX ORDER — ASPIRIN 81 MG/1
81 TABLET ORAL DAILY
Status: DISCONTINUED | OUTPATIENT
Start: 2024-10-31 | End: 2024-11-02 | Stop reason: HOSPADM

## 2024-10-30 RX ORDER — POTASSIUM CHLORIDE 1500 MG/1
40 TABLET, EXTENDED RELEASE ORAL PRN
Status: DISCONTINUED | OUTPATIENT
Start: 2024-10-30 | End: 2024-11-02 | Stop reason: HOSPADM

## 2024-10-30 RX ORDER — POTASSIUM CHLORIDE 7.45 MG/ML
10 INJECTION INTRAVENOUS PRN
Status: DISCONTINUED | OUTPATIENT
Start: 2024-10-30 | End: 2024-11-02 | Stop reason: HOSPADM

## 2024-10-30 RX ORDER — 0.9 % SODIUM CHLORIDE 0.9 %
1000 INTRAVENOUS SOLUTION INTRAVENOUS ONCE
Status: COMPLETED | OUTPATIENT
Start: 2024-10-30 | End: 2024-10-30

## 2024-10-30 RX ORDER — SODIUM CHLORIDE 9 MG/ML
INJECTION, SOLUTION INTRAVENOUS CONTINUOUS
Status: DISCONTINUED | OUTPATIENT
Start: 2024-10-30 | End: 2024-11-02

## 2024-10-30 RX ORDER — METOPROLOL TARTRATE 25 MG/1
12.5 TABLET, FILM COATED ORAL 2 TIMES DAILY
Status: DISCONTINUED | OUTPATIENT
Start: 2024-10-30 | End: 2024-11-02 | Stop reason: HOSPADM

## 2024-10-30 RX ORDER — MAGNESIUM SULFATE IN WATER 40 MG/ML
2000 INJECTION, SOLUTION INTRAVENOUS PRN
Status: DISCONTINUED | OUTPATIENT
Start: 2024-10-30 | End: 2024-11-02 | Stop reason: HOSPADM

## 2024-10-30 RX ORDER — PROCHLORPERAZINE EDISYLATE 5 MG/ML
10 INJECTION INTRAMUSCULAR; INTRAVENOUS EVERY 6 HOURS PRN
Status: DISCONTINUED | OUTPATIENT
Start: 2024-10-30 | End: 2024-11-02 | Stop reason: HOSPADM

## 2024-10-30 RX ORDER — OXYBUTYNIN CHLORIDE 5 MG/1
1 TABLET ORAL 2 TIMES DAILY
COMMUNITY

## 2024-10-30 RX ORDER — ACETAMINOPHEN 325 MG/1
650 TABLET ORAL EVERY 6 HOURS PRN
Status: DISCONTINUED | OUTPATIENT
Start: 2024-10-30 | End: 2024-11-02 | Stop reason: HOSPADM

## 2024-10-30 RX ORDER — TAMSULOSIN HYDROCHLORIDE 0.4 MG/1
0.4 CAPSULE ORAL DAILY
Status: DISCONTINUED | OUTPATIENT
Start: 2024-10-31 | End: 2024-11-02 | Stop reason: HOSPADM

## 2024-10-30 RX ORDER — MECOBALAMIN 5000 MCG
5 TABLET,DISINTEGRATING ORAL NIGHTLY PRN
Status: DISCONTINUED | OUTPATIENT
Start: 2024-10-30 | End: 2024-11-02 | Stop reason: HOSPADM

## 2024-10-30 RX ORDER — LEVOTHYROXINE SODIUM 50 UG/1
50 TABLET ORAL DAILY
Status: DISCONTINUED | OUTPATIENT
Start: 2024-10-31 | End: 2024-11-02 | Stop reason: HOSPADM

## 2024-10-30 RX ORDER — LIDOCAINE HYDROCHLORIDE 20 MG/ML
JELLY TOPICAL PRN
Status: DISCONTINUED | OUTPATIENT
Start: 2024-10-30 | End: 2024-11-02 | Stop reason: HOSPADM

## 2024-10-30 RX ORDER — ATORVASTATIN CALCIUM 40 MG/1
40 TABLET, FILM COATED ORAL NIGHTLY
Status: DISCONTINUED | OUTPATIENT
Start: 2024-10-30 | End: 2024-11-02 | Stop reason: HOSPADM

## 2024-10-30 RX ORDER — DOCUSATE SODIUM 100 MG/1
100 CAPSULE, LIQUID FILLED ORAL 2 TIMES DAILY PRN
Status: DISCONTINUED | OUTPATIENT
Start: 2024-10-30 | End: 2024-11-02 | Stop reason: HOSPADM

## 2024-10-30 RX ORDER — CLOPIDOGREL BISULFATE 75 MG/1
75 TABLET ORAL DAILY
Status: DISCONTINUED | OUTPATIENT
Start: 2024-10-31 | End: 2024-11-02 | Stop reason: HOSPADM

## 2024-10-30 RX ORDER — PANTOPRAZOLE SODIUM 40 MG/1
40 TABLET, DELAYED RELEASE ORAL
Status: DISCONTINUED | OUTPATIENT
Start: 2024-10-31 | End: 2024-11-02 | Stop reason: HOSPADM

## 2024-10-30 RX ADMIN — SODIUM CHLORIDE 1000 ML: 9 INJECTION, SOLUTION INTRAVENOUS at 19:30

## 2024-10-30 RX ADMIN — ATORVASTATIN CALCIUM 40 MG: 40 TABLET, FILM COATED ORAL at 23:28

## 2024-10-30 RX ADMIN — CEFTRIAXONE SODIUM 2000 MG: 2 INJECTION, POWDER, FOR SOLUTION INTRAMUSCULAR; INTRAVENOUS at 21:32

## 2024-10-30 RX ADMIN — SODIUM CHLORIDE: 9 INJECTION, SOLUTION INTRAVENOUS at 22:18

## 2024-10-30 RX ADMIN — SODIUM CHLORIDE: 9 INJECTION, SOLUTION INTRAVENOUS at 23:34

## 2024-10-30 RX ADMIN — LIDOCAINE HYDROCHLORIDE: 20 JELLY TOPICAL at 20:03

## 2024-10-30 RX ADMIN — METOPROLOL TARTRATE 12.5 MG: 25 TABLET, FILM COATED ORAL at 22:17

## 2024-10-30 ASSESSMENT — ENCOUNTER SYMPTOMS
ABDOMINAL PAIN: 0
NAUSEA: 0
CHEST TIGHTNESS: 0
WHEEZING: 0
BACK PAIN: 0
VOMITING: 0
COUGH: 0
SHORTNESS OF BREATH: 0
DIARRHEA: 0
SORE THROAT: 0

## 2024-10-30 ASSESSMENT — PAIN - FUNCTIONAL ASSESSMENT: PAIN_FUNCTIONAL_ASSESSMENT: NONE - DENIES PAIN

## 2024-10-30 NOTE — ED PROVIDER NOTES
Chief complaint:  Fatigue      HPI history provided by the patient  Patient presents here complaint of feeling fatigued for the last 2 weeks or so.  No specific complaint otherwise, no chest pain or palpitations or shortness of breath.  No lightheadedness or syncope.  No abdominal pain or flank pain.  No nausea vomiting or diarrhea.  No hematuria or dysuria.  No extremity numbness, tingling, paresthesia or weakness.  No fevers, sweats or chills.    Review of Systems   Constitutional:  Negative for chills, diaphoresis, fatigue and fever.   HENT:  Negative for congestion and sore throat.    Respiratory:  Negative for cough, chest tightness, shortness of breath and wheezing.    Cardiovascular:  Negative for chest pain, palpitations and leg swelling.   Gastrointestinal:  Negative for abdominal pain, diarrhea, nausea and vomiting.   Genitourinary:  Negative for dysuria, flank pain and frequency.   Musculoskeletal:  Negative for arthralgias, back pain, gait problem, joint swelling, myalgias, neck pain and neck stiffness.   Skin:  Negative for rash and wound.   Neurological:  Negative for dizziness, seizures, syncope, weakness, light-headedness, numbness and headaches.   All other systems reviewed and are negative.       Physical Exam  Vitals and nursing note reviewed.   Constitutional:       General: He is awake. He is not in acute distress.     Appearance: He is well-developed. He is not ill-appearing, toxic-appearing or diaphoretic.   HENT:      Head: Normocephalic and atraumatic.   Eyes:      General: No scleral icterus.     Pupils: Pupils are equal, round, and reactive to light.   Cardiovascular:      Rate and Rhythm: Normal rate and regular rhythm.      Heart sounds: Normal heart sounds. No murmur heard.  Pulmonary:      Effort: Pulmonary effort is normal. No respiratory distress.      Breath sounds: Normal breath sounds. No stridor, decreased air movement or transmitted upper airway sounds. No decreased breath  for sepsis, but does not meet criteria for severe sepsis or septic shock    ED Course as of 10/30/24 2127   Wed Oct 30, 2024   2040 Patient's wife at bedside, patient is laying the bed resting comfortably no distress.  She states has been very weak and fatigued lately trouble getting around at home, was already on several weeks of Bactrim trying to treat a UTI and then had difficulty urinating with diminished urine output and likely urinary retention.  They have already talked about putting a suprapubic catheter in him apparently.  She states the urologist told him to come in for further treatment and evaluations as well. [NC]   2122 Case discussed with Kyara Baron for Dr. Paredes, detailed overview given, they will admit the patient.   In discussion with the patient and his wife, [facility] Ramirez for urology [NC]      ED Course User Index  [NC] Ralph Chavez, DO       --------------------------------------------- PAST HISTORY ---------------------------------------------  Past Medical History:  has a past medical history of CAD (coronary artery disease), HTN (hypertension), Hyperlipidemia, NSTEMI (non-ST elevated myocardial infarction) (HCC), Rectal cancer (HCC), and Tobacco abuse, in remission.    Past Surgical History:  has a past surgical history that includes Coronary artery bypass graft (N/A, 12/10/2020); Rectal surgery; Hemorrhoid surgery; Inguinal hernia repair (Left); Colonoscopy; TURP (N/A, 2/12/2021); Bladder surgery (N/A, 6/28/2021); and Cystoscopy (N/A, 9/2/2022).    Social History:  reports that he quit smoking about 24 years ago. His smoking use included cigarettes. He started smoking about 57 years ago. He has a 33 pack-year smoking history. He has never used smokeless tobacco. He reports current alcohol use of about 2.0 standard drinks of alcohol per week. He reports that he does not use drugs.    Family History: family history is not on file.     The

## 2024-10-31 ENCOUNTER — APPOINTMENT (OUTPATIENT)
Dept: ULTRASOUND IMAGING | Age: 79
End: 2024-10-31
Payer: MEDICARE

## 2024-10-31 LAB
25(OH)D3 SERPL-MCNC: 38.9 NG/ML (ref 30–100)
ALBUMIN SERPL-MCNC: 2.6 G/DL (ref 3.5–5.2)
ALP SERPL-CCNC: 124 U/L (ref 40–129)
ALT SERPL-CCNC: 19 U/L (ref 0–40)
ANION GAP SERPL CALCULATED.3IONS-SCNC: 11 MMOL/L (ref 7–16)
AST SERPL-CCNC: 26 U/L (ref 0–39)
BASOPHILS # BLD: 0.03 K/UL (ref 0–0.2)
BASOPHILS NFR BLD: 0 % (ref 0–2)
BILIRUB SERPL-MCNC: 0.3 MG/DL (ref 0–1.2)
BUN SERPL-MCNC: 37 MG/DL (ref 6–23)
CALCIUM SERPL-MCNC: 8.7 MG/DL (ref 8.6–10.2)
CHLORIDE SERPL-SCNC: 102 MMOL/L (ref 98–107)
CHOLEST SERPL-MCNC: 84 MG/DL
CO2 SERPL-SCNC: 21 MMOL/L (ref 22–29)
CREAT SERPL-MCNC: 1.9 MG/DL (ref 0.7–1.2)
EOSINOPHIL # BLD: 0.09 K/UL (ref 0.05–0.5)
EOSINOPHILS RELATIVE PERCENT: 1 % (ref 0–6)
ERYTHROCYTE [DISTWIDTH] IN BLOOD BY AUTOMATED COUNT: 14.4 % (ref 11.5–15)
FOLATE SERPL-MCNC: 4.2 NG/ML (ref 4.8–24.2)
GFR, ESTIMATED: 36 ML/MIN/1.73M2
GLUCOSE SERPL-MCNC: 83 MG/DL (ref 74–99)
HCT VFR BLD AUTO: 29.5 % (ref 37–54)
HDLC SERPL-MCNC: 32 MG/DL
HGB BLD-MCNC: 9.5 G/DL (ref 12.5–16.5)
IMM GRANULOCYTES # BLD AUTO: 0.07 K/UL (ref 0–0.58)
IMM GRANULOCYTES NFR BLD: 1 % (ref 0–5)
IRON SATN MFR SERPL: 10 % (ref 20–55)
IRON SERPL-MCNC: 16 UG/DL (ref 59–158)
LDLC SERPL CALC-MCNC: 38 MG/DL
LYMPHOCYTES NFR BLD: 1.18 K/UL (ref 1.5–4)
LYMPHOCYTES RELATIVE PERCENT: 9 % (ref 20–42)
MAGNESIUM SERPL-MCNC: 2.1 MG/DL (ref 1.6–2.6)
MCH RBC QN AUTO: 29.3 PG (ref 26–35)
MCHC RBC AUTO-ENTMCNC: 32.2 G/DL (ref 32–34.5)
MCV RBC AUTO: 91 FL (ref 80–99.9)
MONOCYTES NFR BLD: 1.36 K/UL (ref 0.1–0.95)
MONOCYTES NFR BLD: 10 % (ref 2–12)
NEUTROPHILS NFR BLD: 80 % (ref 43–80)
NEUTS SEG NFR BLD: 11.13 K/UL (ref 1.8–7.3)
PHOSPHATE SERPL-MCNC: 2.9 MG/DL (ref 2.5–4.5)
PLATELET # BLD AUTO: 211 K/UL (ref 130–450)
PMV BLD AUTO: 10.9 FL (ref 7–12)
POTASSIUM SERPL-SCNC: 4.4 MMOL/L (ref 3.5–5)
PROT SERPL-MCNC: 6.6 G/DL (ref 6.4–8.3)
RBC # BLD AUTO: 3.24 M/UL (ref 3.8–5.8)
SODIUM SERPL-SCNC: 134 MMOL/L (ref 132–146)
T4 FREE SERPL-MCNC: 0.9 NG/DL (ref 0.9–1.7)
TIBC SERPL-MCNC: 158 UG/DL (ref 250–450)
TRIGL SERPL-MCNC: 71 MG/DL
TROPONIN I SERPL HS-MCNC: 18 NG/L (ref 0–11)
VIT B12 SERPL-MCNC: 524 PG/ML (ref 211–946)
VLDLC SERPL CALC-MCNC: 14 MG/DL
WBC OTHER # BLD: 13.9 K/UL (ref 4.5–11.5)

## 2024-10-31 PROCEDURE — 2580000003 HC RX 258

## 2024-10-31 PROCEDURE — 82607 VITAMIN B-12: CPT

## 2024-10-31 PROCEDURE — 84439 ASSAY OF FREE THYROXINE: CPT

## 2024-10-31 PROCEDURE — 82306 VITAMIN D 25 HYDROXY: CPT

## 2024-10-31 PROCEDURE — 36415 COLL VENOUS BLD VENIPUNCTURE: CPT

## 2024-10-31 PROCEDURE — 6370000000 HC RX 637 (ALT 250 FOR IP)

## 2024-10-31 PROCEDURE — 83540 ASSAY OF IRON: CPT

## 2024-10-31 PROCEDURE — 97165 OT EVAL LOW COMPLEX 30 MIN: CPT

## 2024-10-31 PROCEDURE — 6360000002 HC RX W HCPCS

## 2024-10-31 PROCEDURE — 83550 IRON BINDING TEST: CPT

## 2024-10-31 PROCEDURE — 84100 ASSAY OF PHOSPHORUS: CPT

## 2024-10-31 PROCEDURE — 80061 LIPID PANEL: CPT

## 2024-10-31 PROCEDURE — 85025 COMPLETE CBC W/AUTO DIFF WBC: CPT

## 2024-10-31 PROCEDURE — 82746 ASSAY OF FOLIC ACID SERUM: CPT

## 2024-10-31 PROCEDURE — 83735 ASSAY OF MAGNESIUM: CPT

## 2024-10-31 PROCEDURE — 84484 ASSAY OF TROPONIN QUANT: CPT

## 2024-10-31 PROCEDURE — 80053 COMPREHEN METABOLIC PANEL: CPT

## 2024-10-31 PROCEDURE — 76775 US EXAM ABDO BACK WALL LIM: CPT

## 2024-10-31 PROCEDURE — 1200000000 HC SEMI PRIVATE

## 2024-10-31 RX ORDER — FOLIC ACID 1 MG/1
1 TABLET ORAL DAILY
Status: DISCONTINUED | OUTPATIENT
Start: 2024-10-31 | End: 2024-11-02 | Stop reason: HOSPADM

## 2024-10-31 RX ADMIN — PANTOPRAZOLE SODIUM 40 MG: 40 TABLET, DELAYED RELEASE ORAL at 06:29

## 2024-10-31 RX ADMIN — SODIUM CHLORIDE 25 MG: 9 INJECTION, SOLUTION INTRAVENOUS at 18:37

## 2024-10-31 RX ADMIN — SODIUM CHLORIDE 100 MG: 9 INJECTION, SOLUTION INTRAVENOUS at 20:09

## 2024-10-31 RX ADMIN — WATER 1000 MG: 1 INJECTION INTRAMUSCULAR; INTRAVENOUS; SUBCUTANEOUS at 08:59

## 2024-10-31 RX ADMIN — TAMSULOSIN HYDROCHLORIDE 0.4 MG: 0.4 CAPSULE ORAL at 08:59

## 2024-10-31 RX ADMIN — ATORVASTATIN CALCIUM 40 MG: 40 TABLET, FILM COATED ORAL at 20:05

## 2024-10-31 RX ADMIN — METOPROLOL TARTRATE 12.5 MG: 25 TABLET, FILM COATED ORAL at 08:59

## 2024-10-31 RX ADMIN — SODIUM CHLORIDE: 9 INJECTION, SOLUTION INTRAVENOUS at 08:56

## 2024-10-31 RX ADMIN — LEVOTHYROXINE SODIUM 50 MCG: 0.05 TABLET ORAL at 06:29

## 2024-10-31 RX ADMIN — CHOLECALCIFEROL TAB 125 MCG (5000 UNIT) 5000 UNITS: 125 TAB at 08:59

## 2024-10-31 RX ADMIN — METOPROLOL TARTRATE 12.5 MG: 25 TABLET, FILM COATED ORAL at 20:04

## 2024-10-31 RX ADMIN — FOLIC ACID 1 MG: 1 TABLET ORAL at 18:34

## 2024-10-31 NOTE — CONSULTS
10/31/2024 11:37 AM  Janet Rivera  [id number]     Chief Complaint:    Recurrent UTI      History of Present Illness:      The patient is a 79 y.o. male patient who presented to the hospital with complaints of weakness and weight loss. He has been treated outpatient for a UTI with Bactrim per his wife. He had a shirley inserted in the ER for unknown volume. Per his wife when it was inserted they did not get a lot of urine out of him. Shirley is currently draining tea colored urine with sediment.     He is known to our practice and is a patient of Dr. Cole Ramirez.   He has a history of BPH with LUTS and underwent a TURP in 2021, gross hematuria requiring fulguration in 2021, acute urinary retention, prostatitis, history of anal cancer postsurgery and XRT in 2008, stress urinary incontinence, urethral stricture status post dilation in 2022, and bilateral hydronephrosis. He underwent cystoscopy in December 2023 with meatal dilation and shirley placement.       Past Medical History:   Diagnosis Date    CAD (coronary artery disease)     S/P CABG follows with PCP    HTN (hypertension)     Hyperlipidemia     NSTEMI (non-ST elevated myocardial infarction) (HCC)     Rectal cancer (HCC)     Tobacco abuse, in remission          Past Surgical History:   Procedure Laterality Date    BLADDER SURGERY N/A 6/28/2021    CYSTOSCOPY RETROGRADE PYELOGRAM, CLOT EVACUATION performed by Abe Hines MD at [facility] OR    COLONOSCOPY      CORONARY ARTERY BYPASS GRAFT N/A 12/10/2020    CORONARY ARTERY BYPASS, BELINDA performed by Andi Duenas DO at [facility] [place] [place]    CYSTOSCOPY N/A 9/2/2022    CYSTOSCOPY RETROGRADE PYELOGRAM URETHRAL DILATION, SHIRLEY PLACEMENT, DIGITAL RECTAL EXAM UNDER ANETHESIA [facility]    HEMORRHOID SURGERY      INGUINAL HERNIA REPAIR Left     RECTAL SURGERY      TURP N/A 2/12/2021    CYSTOSCOPY RETROGRADE PYELOGRAM PLASMA LOOP TRANSURETHRAL RESECTION PROSTATE [facility]        Medications Prior to Admission:    Medications Prior to Admission: oxyBUTYnin (DITROPAN) 5 MG tablet, Take 1 tablet by mouth 2 times daily  metoprolol tartrate (LOPRESSOR) 25 MG tablet, TAKE ONE-HALF TABLET BY MOUTH TWICE DAILY  tamsulosin (FLOMAX) 0.4 MG capsule, Take 1 capsule by mouth in the morning.  Levothyroxine Sodium (SYNTHROID PO), Take 50 mcg by mouth daily  Cholecalciferol (VITAMIN D3) 125 MCG (5000 UT) CAPS, Take 1 capsule by mouth daily  atorvastatin (LIPITOR) 40 MG tablet, Take 1 tablet by mouth nightly  clopidogrel (PLAVIX) 75 MG tablet, Take 1 tablet by mouth daily  aspirin 81 MG EC tablet, Take 1 tablet by mouth daily  magnesium hydroxide (MILK OF MAGNESIA) 400 MG/5ML suspension, Take 5 mLs by mouth daily as needed for Constipation  docusate sodium (COLACE) 100 MG capsule, Take 1 capsule by mouth 2 times daily as needed for Constipation (constipation)    Allergies:    Adhesive tape and Cipro [ciprofloxacin hcl]    Social History:    reports that he quit smoking about 24 years ago. His smoking use included cigarettes. He started smoking about 57 years ago. He has a 33 pack-year smoking history. He has never used smokeless tobacco. He reports current alcohol use of about 2.0 standard drinks of alcohol per week. He reports that he does not use drugs.    Family History:   Non-contributory to this Urological problem  family history is not on file.    Review of Systems:  Constitutional: No fever or chills   Respiratory: negative for cough and hemoptysis  Cardiovascular: negative for chest pain and dyspnea  Gastrointestinal: negative for abdominal pain, diarrhea, nausea and vomiting   Derm: negative for rash and skin lesion(s)  Neurological: negative for seizures and tremors  Musculoskeletal: Negative    Psychiatric: Negative   : As above in the HPI, otherwise negative  All other reviews are negative    Physical Exam:     Vitals:  BP (!) 107/52   Pulse (!) 112   Temp 98.1 °F (36.7 °C) (Oral)

## 2024-10-31 NOTE — ACP (ADVANCE CARE PLANNING)
Advance Care Planning   The patient has the following advanced directives on file:        The patient has appointed the following active healthcare agents:    Primary Decision Maker: Rachelle Rivera - [facility] - [phone]

## 2024-10-31 NOTE — CONSULTS
[address] [place] [address] [address] [place] [address]  Phone [phone]   Fax [phone]  Pt Name: Janet Rivera  MRN: [id number]  1945       Janet Rivera is a 79 y.o. male who  has a past medical history of CAD (coronary artery disease), HTN (hypertension), Hyperlipidemia, NSTEMI (non-ST elevated myocardial infarction) (HCC), Rectal cancer (HCC), and Tobacco abuse, in remission.  who is referred with    Chief Complaint   Patient presents with    Fatigue     Pt states he went to get covid test (neg) has no taste or appetite. Generalized weakness.      Pt from home  Wife present   Came in with not feeling well for 2 weeks weakness, dec appetite, weight loss urinary retention  98.1 tachy 112 bp stable   Wbc17.9 cr 2.4   UA cloudy le urine ghb  wbc rbc bacteria   Pt was on bactrim about 2-3 weeks PTA     Current Facility-Administered Medications:     cefTRIAXone (ROCEPHIN) 1,000 mg in sterile water 10 mL IV syringe, 1,000 mg, IntraVENous, Q24H,         XR CHEST (2 VW)    Result Date: 10/30/2024  Chronic findings without appreciable acute process.      CURRENT MEDICATIONS       Current Facility-Administered Medications:     lidocaine (XYLOCAINE) 2 % uro-jet, , Topical, PRN, Ralph Chavez DO, Given at 10/30/24 2003    atorvastatin (LIPITOR) tablet 40 mg, 40 mg, Oral, Nightly, Kyara Baron APRN - CNP, 40 mg at 10/30/24 2328    vitamin D3 (CHOLECALCIFEROL) tablet 5,000 Units, 5,000 Units, Oral, Daily, Kyara Baron APRN - CNP    [Held by provider] clopidogrel (PLAVIX) tablet 75 mg, 75 mg, Oral, Daily, Kyara Baron APRN - CNP    [Held by provider] aspirin EC tablet 81 mg, 81 mg, Oral, Daily, Kyara Baron APRN - CNP    docusate sodium (COLACE) capsule 100 mg, 100 mg, Oral, BID PRN, Kyara Baron APRN - CNP    levothyroxine (SYNTHROID) tablet 50 mcg, 50 mcg, Oral, Daily, Kyara Baron APRN - CNP, 50 mcg at 10/31/24 0629    magnesium hydroxide (MILK OF MAGNESIA) 400 MG/5ML

## 2024-10-31 NOTE — PLAN OF CARE
Problem: Safety - Adult  Goal: Free from fall injury  10/31/2024 1406 by Bina Howard RN  Outcome: Progressing  10/31/2024 1404 by Bina Howard RN  Outcome: Progressing

## 2024-10-31 NOTE — PROGRESS NOTES
OCCUPATIONAL THERAPY INITIAL EVALUATION    [facility]  [address] [place]        Date:10/31/2024                                                  Patient Name: Janet Rivera    MRN: [id number]    : 1945    Room: [address] [place] [address]      Evaluating OT: Cesar Troy OTR/L; #[id number]     Referring Provider and Specific Provider Orders/Date:      10/30/24 2145  OT eval and treat  Start:  10/30/24 2145,   End:  10/30/24 2145,   ONE TIME,   Standing Count:  1 Occurrences,   R         Loraine, Kyara PUENTE, APRN - CNP      Placement Recommendation: Home with occupational therapy       Diagnosis:   1. Acute cystitis with hematuria    2. Acute renal failure, unspecified acute renal failure type (HCC)         Surgery: None      Pertinent Medical History:       Past Medical History:   Diagnosis Date    CAD (coronary artery disease)     S/P CABG follows with PCP    HTN (hypertension)     Hyperlipidemia     NSTEMI (non-ST elevated myocardial infarction) (HCC)     Rectal cancer (HCC)     Tobacco abuse, in remission          Past Surgical History:   Procedure Laterality Date    BLADDER SURGERY N/A 2021    CYSTOSCOPY RETROGRADE PYELOGRAM, CLOT EVACUATION performed by Abe Hines MD at [facility] OR    COLONOSCOPY      CORONARY ARTERY BYPASS GRAFT N/A 12/10/2020    CORONARY ARTERY BYPASS, BELINDA performed by Andi Duenas DO at [facility] OR    CYSTOSCOPY N/A 2022    CYSTOSCOPY RETROGRADE PYELOGRAM URETHRAL DILATION, SHIRLEY PLACEMENT, DIGITAL RECTAL EXAM UNDER ANETHESIA performed by Cole Ramirez DO at [facility] OR    HEMORRHOID SURGERY      INGUINAL HERNIA REPAIR Left     RECTAL SURGERY      TURP N/A 2021    CYSTOSCOPY RETROGRADE PYELOGRAM PLASMA LOOP TRANSURETHRAL RESECTION PROSTATE performed by Cole Ramirez DO at [facility] OR        Precautions:  Fall Risk, Shirley     Assessment of current deficits:     [x] Functional mobility  [x]ADLs  [x] Strength               []Cognition

## 2024-10-31 NOTE — CARE COORDINATION
Case Management Assessment  Initial Evaluation    Date/Time of Evaluation: 10/31/2024 10:06 AM  Assessment Completed by: YOVANY Collins    If patient is discharged prior to next notation, then this note serves as note for discharge by case management.    Patient Name: Janet Rivera                   YOB: 1945  Diagnosis: MAYRA (acute kidney injury) (HCC) [N17.9]  Acute cystitis with hematuria [N30.01]  Acute renal failure, unspecified acute renal failure type (HCC) [N17.9]                   Date / Time: 10/30/2024  4:27 PM    Patient Admission Status: Inpatient   Readmission Risk (Low < 19, Mod (19-27), High > 27): Readmission Risk Score: 15.5    Current PCP: Sumeet Nieto, DO  PCP verified by CM? Yes    Chart Reviewed: Yes      History Provided by: Spouse (pt is resting,)  Patient Orientation: Alert and Oriented, Person, Place, Situation    Patient Cognition: Alert    Hospitalization in the last 30 days (Readmission):    If yes, Readmission Assessment in CM Navigator will be completed.    Advance Directives:      Code Status: Full Code   Patient's Primary Decision Maker is: Legal Next of Kin    Primary Decision Maker: Rachelle Rivera - Spouse - [phone]    Discharge Planning:    Patient lives with: Spouse/Significant Other Type of Home: House two story 4 steps with rails, home is handicap accessible per wife. Chair lift to upper level    Primary Care Giver: Spouse  Patient Support Systems include: Spouse/Significant Other, Children   Current Financial resources:    Current community resources:    Current services prior to admission: None            Current DME:              Type of Home Care services:      ADLS  Prior functional level: Independent in ADLs/IADLs  Current functional level: Independent in ADLs/IADLs    PT AM-PAC:   /24  OT AM-PAC:   /24    Family can provide assistance at DC: Yes  Would you like Case Management to discuss the discharge plan with any other family

## 2024-10-31 NOTE — PROGRESS NOTES
Internal Medicine Consult Note    [facility]    Abe Kang D.O., ANASTACIOI.                    Shan Wright D.O., GOMEZ Camacho D.O.     Darline Kasper, MSN, APRN, NP-C  Dwain Carver, MSN, APRN-CNP  Loyd Dugan, MSN, APRN-CNP  Maryam Hoang, MSN APRN-CNP  Kyara Baron, MSN. APRN-NP-C     Primary Care Physician: Sumeet Nieto DO   Admitting Physician:  Abe Kang DO  Admission date and time: 10/30/2024  4:27 PM    Room:  [address] [place] [address]  Admitting diagnosis: MAYRA (acute kidney injury) (HCC) [N17.9]  Acute cystitis with hematuria [N30.01]  Acute renal failure, unspecified acute renal failure type (HCC) [N17.9]    Patient Name: Janet Rivera  MRN: [id number]    Date of Service: 10/31/2024     Subjective:  Janet is a 79 y.o. male who was seen and examined today,10/31/2024, at the bedside.  Patient seem today.  Patient is currently in sinus rhythm.  Renal function has improved.  Patient has recurrent UTI since he had radiation and chemotherapy in 2008 for anal cancer.    Wife was present during my examination.    Review of System:   Constitutional:   Denies fever or chills, weight loss or gain, fatigue or malaise.  HEENT:   Denies ear pain, sore throat, sinus or eye problems.  Cardiovascular:   Denies any chest pain, irregular heartbeats, or palpitations.   Respiratory:   Denies shortness of breath, coughing, sputum production, hemoptysis, or wheezing.  Gastrointestinal:   Denies nausea, vomiting, diarrhea, or constipation.  Denies any abdominal pain.  Genitourinary:    Patient has evidence of urinary retention with frequency recurrent UTI  Extremities:   Denies lower extremity swelling, edema or cyanosis.   Neurology:    Denies any headache or focal neurological deficits, Denies generalized weakness or memory difficulty.   Psch:   Denies being anxious or depressed.  Musculoskeletal:    Denies  myalgias, joint complaints or back pain.

## 2024-10-31 NOTE — H&P
Internal Medicine Consult Note    [facility]    Abe Kang D.O., ANASTACIOI.                    Shan Wright D.O., CHUYOMorenoIMoreno Camacho D.O.     Darline Kasper, MSN, APRN, NP-C  Dwain Carver, MSN, APRN-CNP  Loyd Dugan, MSN, APRN-CNP  Maryam Hoang, MSN, APRN-CNP  Kyara Baron, MSN, APRN-CNP     Department of Internal Medicine  History and Physical    [facility]   Admitting Physician: Dr. Kang (for Dr. Paredes)  Consultants: Dr. Ramirez, Dr. Estrella      CHIEF COMPLAINT:  urinary retention, weakness    HISTORY OF PRESENT ILLNESS:    Patient has had issues with UTIs for the last four years. He did have treatment for anal cancer in 2008 and the structure of his urinary organs was compromised per wife. Currently he endorses 3 weeks of generalized weakness, urinary retention with incontinence at night, dry mouth, loss of appetite, dysgeusia, and lost 18 pounds over the last two weeks. He did have 2 week course of bactrim outpatient. He had cheney placed in ER and now has hematuria.Patient does not use nicotine, alcohol, marijuana, or illicit drugs. Lives at home with his wife Rachelle, independent with ADLs, no home health services. He is agreeable to admission for consultation with urology and infectious disease.    PAST MEDICAL Hx:  Past Medical History:   Diagnosis Date    CAD (coronary artery disease)     S/P CABG follows with PCP    HTN (hypertension)     Hyperlipidemia     NSTEMI (non-ST elevated myocardial infarction) (HCC)     Rectal cancer (HCC)     Tobacco abuse, in remission        PAST SURGICAL Hx:   Past Surgical History:   Procedure Laterality Date    BLADDER SURGERY N/A 6/28/2021    CYSTOSCOPY RETROGRADE PYELOGRAM, CLOT EVACUATION performed by Abe Hines MD at [facility] OR    COLONOSCOPY      CORONARY ARTERY BYPASS GRAFT N/A 12/10/2020    CORONARY ARTERY BYPASS, BELINDA performed by Andi Duenas DO at [facility] OR    CYSTOSCOPY N/A

## 2024-11-01 LAB
ALBUMIN SERPL-MCNC: 1.6 G/DL (ref 3.5–5.2)
ALP SERPL-CCNC: 125 U/L (ref 40–129)
ALT SERPL-CCNC: 20 U/L (ref 0–40)
ANION GAP SERPL CALCULATED.3IONS-SCNC: 10 MMOL/L (ref 7–16)
ANION GAP SERPL CALCULATED.3IONS-SCNC: 8 MMOL/L (ref 7–16)
AST SERPL-CCNC: 35 U/L (ref 0–39)
BASOPHILS # BLD: 0.03 K/UL (ref 0–0.2)
BASOPHILS NFR BLD: 0 % (ref 0–2)
BILIRUB SERPL-MCNC: 0.4 MG/DL (ref 0–1.2)
BUN SERPL-MCNC: 25 MG/DL (ref 6–23)
BUN SERPL-MCNC: 28 MG/DL (ref 6–23)
CALCIUM SERPL-MCNC: 8.2 MG/DL (ref 8.6–10.2)
CALCIUM SERPL-MCNC: 8.3 MG/DL (ref 8.6–10.2)
CHLORIDE SERPL-SCNC: 106 MMOL/L (ref 98–107)
CHLORIDE SERPL-SCNC: 108 MMOL/L (ref 98–107)
CHLORIDE UR-SCNC: 61 MMOL/L
CO2 SERPL-SCNC: 13 MMOL/L (ref 22–29)
CO2 SERPL-SCNC: 19 MMOL/L (ref 22–29)
CREAT SERPL-MCNC: 1.4 MG/DL (ref 0.7–1.2)
CREAT SERPL-MCNC: 1.4 MG/DL (ref 0.7–1.2)
CREAT UR-MCNC: 66.6 MG/DL (ref 40–278)
CREAT UR-MCNC: 66.6 MG/DL (ref 40–278)
EOSINOPHIL # BLD: 0.14 K/UL (ref 0.05–0.5)
EOSINOPHILS RELATIVE PERCENT: 2 % (ref 0–6)
ERYTHROCYTE [DISTWIDTH] IN BLOOD BY AUTOMATED COUNT: 14.5 % (ref 11.5–15)
GFR, ESTIMATED: 50 ML/MIN/1.73M2
GFR, ESTIMATED: 50 ML/MIN/1.73M2
GLUCOSE SERPL-MCNC: 106 MG/DL (ref 74–99)
GLUCOSE SERPL-MCNC: 77 MG/DL (ref 74–99)
HCT VFR BLD AUTO: 30.1 % (ref 37–54)
HGB BLD-MCNC: 9 G/DL (ref 12.5–16.5)
IMM GRANULOCYTES # BLD AUTO: 0.05 K/UL (ref 0–0.58)
IMM GRANULOCYTES NFR BLD: 1 % (ref 0–5)
LYMPHOCYTES NFR BLD: 1.11 K/UL (ref 1.5–4)
LYMPHOCYTES RELATIVE PERCENT: 12 % (ref 20–42)
MAGNESIUM SERPL-MCNC: 2.6 MG/DL (ref 1.6–2.6)
MCH RBC QN AUTO: 29.9 PG (ref 26–35)
MCHC RBC AUTO-ENTMCNC: 29.9 G/DL (ref 32–34.5)
MCV RBC AUTO: 100 FL (ref 80–99.9)
MICROORGANISM SPEC CULT: ABNORMAL
MONOCYTES NFR BLD: 1.12 K/UL (ref 0.1–0.95)
MONOCYTES NFR BLD: 12 % (ref 2–12)
NEUTROPHILS NFR BLD: 74 % (ref 43–80)
NEUTS SEG NFR BLD: 7.05 K/UL (ref 1.8–7.3)
PHOSPHATE SERPL-MCNC: 2.5 MG/DL (ref 2.5–4.5)
PLATELET # BLD AUTO: 174 K/UL (ref 130–450)
PMV BLD AUTO: 11 FL (ref 7–12)
POTASSIUM SERPL-SCNC: 4.4 MMOL/L (ref 3.5–5)
POTASSIUM SERPL-SCNC: 4.6 MMOL/L (ref 3.5–5)
POTASSIUM, UR: 17.5 MMOL/L
PROT SERPL-MCNC: 6 G/DL (ref 6.4–8.3)
RBC # BLD AUTO: 3.01 M/UL (ref 3.8–5.8)
SERVICE CMNT-IMP: ABNORMAL
SODIUM SERPL-SCNC: 129 MMOL/L (ref 132–146)
SODIUM SERPL-SCNC: 135 MMOL/L (ref 132–146)
SODIUM UR-SCNC: 60 MMOL/L
SPECIMEN DESCRIPTION: ABNORMAL
TOTAL PROTEIN, URINE: 293 MG/DL (ref 0–12)
URINE TOTAL PROTEIN CREATININE RATIO: 4.4 (ref 0–0.2)
WBC OTHER # BLD: 9.5 K/UL (ref 4.5–11.5)

## 2024-11-01 PROCEDURE — 84100 ASSAY OF PHOSPHORUS: CPT

## 2024-11-01 PROCEDURE — 6360000002 HC RX W HCPCS

## 2024-11-01 PROCEDURE — 80048 BASIC METABOLIC PNL TOTAL CA: CPT

## 2024-11-01 PROCEDURE — 82436 ASSAY OF URINE CHLORIDE: CPT

## 2024-11-01 PROCEDURE — 97161 PT EVAL LOW COMPLEX 20 MIN: CPT | Performed by: PHYSICAL THERAPIST

## 2024-11-01 PROCEDURE — 2580000003 HC RX 258

## 2024-11-01 PROCEDURE — 36415 COLL VENOUS BLD VENIPUNCTURE: CPT

## 2024-11-01 PROCEDURE — 6370000000 HC RX 637 (ALT 250 FOR IP): Performed by: SPECIALIST

## 2024-11-01 PROCEDURE — 85025 COMPLETE CBC W/AUTO DIFF WBC: CPT

## 2024-11-01 PROCEDURE — 84156 ASSAY OF PROTEIN URINE: CPT

## 2024-11-01 PROCEDURE — 83735 ASSAY OF MAGNESIUM: CPT

## 2024-11-01 PROCEDURE — 97530 THERAPEUTIC ACTIVITIES: CPT

## 2024-11-01 PROCEDURE — 80053 COMPREHEN METABOLIC PANEL: CPT

## 2024-11-01 PROCEDURE — 6370000000 HC RX 637 (ALT 250 FOR IP)

## 2024-11-01 PROCEDURE — 1200000000 HC SEMI PRIVATE

## 2024-11-01 PROCEDURE — 97110 THERAPEUTIC EXERCISES: CPT | Performed by: PHYSICAL THERAPIST

## 2024-11-01 PROCEDURE — 82570 ASSAY OF URINE CREATININE: CPT

## 2024-11-01 PROCEDURE — 84300 ASSAY OF URINE SODIUM: CPT

## 2024-11-01 PROCEDURE — 84133 ASSAY OF URINE POTASSIUM: CPT

## 2024-11-01 RX ORDER — CEFDINIR 300 MG/1
300 CAPSULE ORAL 2 TIMES DAILY
Status: DISCONTINUED | OUTPATIENT
Start: 2024-11-01 | End: 2024-11-02 | Stop reason: HOSPADM

## 2024-11-01 RX ORDER — CHOLESTYRAMINE 4 G/9G
1 POWDER, FOR SUSPENSION ORAL 2 TIMES DAILY
Status: DISCONTINUED | OUTPATIENT
Start: 2024-11-01 | End: 2024-11-02 | Stop reason: HOSPADM

## 2024-11-01 RX ORDER — LACTOBACILLUS RHAMNOSUS GG 10B CELL
1 CAPSULE ORAL EVERY 12 HOURS
Qty: 60 CAPSULE | Refills: 0 | Status: SHIPPED | OUTPATIENT
Start: 2024-11-01 | End: 2024-12-01

## 2024-11-01 RX ORDER — CEFDINIR 300 MG/1
300 CAPSULE ORAL 2 TIMES DAILY
Qty: 28 CAPSULE | Refills: 0 | Status: SHIPPED | OUTPATIENT
Start: 2024-11-01 | End: 2024-11-15

## 2024-11-01 RX ORDER — LACTOBACILLUS RHAMNOSUS GG 10B CELL
1 CAPSULE ORAL 2 TIMES DAILY
Status: DISCONTINUED | OUTPATIENT
Start: 2024-11-01 | End: 2024-11-02 | Stop reason: HOSPADM

## 2024-11-01 RX ADMIN — SODIUM CHLORIDE: 9 INJECTION, SOLUTION INTRAVENOUS at 00:06

## 2024-11-01 RX ADMIN — Medication 5 MG: at 21:51

## 2024-11-01 RX ADMIN — LEVOTHYROXINE SODIUM 50 MCG: 0.05 TABLET ORAL at 05:56

## 2024-11-01 RX ADMIN — METOPROLOL TARTRATE 12.5 MG: 25 TABLET, FILM COATED ORAL at 09:41

## 2024-11-01 RX ADMIN — PANTOPRAZOLE SODIUM 40 MG: 40 TABLET, DELAYED RELEASE ORAL at 05:56

## 2024-11-01 RX ADMIN — Medication 1 CAPSULE: at 21:50

## 2024-11-01 RX ADMIN — CHOLECALCIFEROL TAB 125 MCG (5000 UNIT) 5000 UNITS: 125 TAB at 09:41

## 2024-11-01 RX ADMIN — Medication 1 CAPSULE: at 11:41

## 2024-11-01 RX ADMIN — SODIUM CHLORIDE 125 MG: 9 INJECTION, SOLUTION INTRAVENOUS at 18:54

## 2024-11-01 RX ADMIN — CEFDINIR 300 MG: 300 CAPSULE ORAL at 11:41

## 2024-11-01 RX ADMIN — CHOLESTYRAMINE 4 G: 4 POWDER, FOR SUSPENSION ORAL at 21:51

## 2024-11-01 RX ADMIN — ATORVASTATIN CALCIUM 40 MG: 40 TABLET, FILM COATED ORAL at 21:56

## 2024-11-01 RX ADMIN — CHOLESTYRAMINE 4 G: 4 POWDER, FOR SUSPENSION ORAL at 11:41

## 2024-11-01 RX ADMIN — CEFDINIR 300 MG: 300 CAPSULE ORAL at 21:50

## 2024-11-01 RX ADMIN — METOPROLOL TARTRATE 12.5 MG: 25 TABLET, FILM COATED ORAL at 21:50

## 2024-11-01 RX ADMIN — FOLIC ACID 1 MG: 1 TABLET ORAL at 09:41

## 2024-11-01 RX ADMIN — WATER 1000 MG: 1 INJECTION INTRAMUSCULAR; INTRAVENOUS; SUBCUTANEOUS at 09:41

## 2024-11-01 NOTE — PROGRESS NOTES
No                Hand Dominance: R     AROM (PROM) Strength Additional Info:  Goal:   RUE  WFL 4/5 good  and wfl FMC/dexterity noted during ADL tasks   Improve overall RUE strength  for participation in functional tasks   LUE WFL 4/5 good  and wfl FMC/dexterity noted during ADL tasks   Improve overall LUE strength  for participation in functional tasks          Hearing: WFL   Sensation:  No c/o numbness or tingling  Tone: WFL   Edema: None    Comments: Nursing approved therapy session. Upon arrival the patient was supine with HOB elevated.  At end of session, patient was seated in bedside chair with call light and phone within reach, all lines and tubes intact.  Overall patient demonstrated decreased independence and safety during completion of ADL/functional transfer/mobility tasks.  Pt would benefit from continued skilled OT to increase safety and independence with completion of ADL/IADL tasks for functional independence and quality of life.    Treatment: OT treatment provided this date includes:   Instruction/training on safety and adapted techniques for completion of ADLs   Instruction/training on safe functional mobility/transfer techniques   Instruction/training on energy conservation/work simplification for completion of ADLs   Instruction/training on proper positioning/alignment to prevent contractures    Neuromuscular Reeducation to facilitate balance/righting reactions for increased function with ADLs tasks    Rehab Potential: Good for established goals.      Patient / Family Goal: Patient would like to return home and return to [facility] re: ADLs and IADLs        Patient and/or family were instructed on functional diagnosis, prognosis/goals and OT plan of care. Demonstrated good understanding.      Time In: 9:24 AM   Time Out: 9:32 AM    Total Treatment Time:      Min Units   OT Eval Low 20639       OT Eval Medium 24524      OT Eval High 69375      OT Re-Eval 69063            ADL/Self Care [id number]

## 2024-11-01 NOTE — PROGRESS NOTES
ambulated independently   Rehab Potential: good - for baseline    Past medical history:   Past Medical History:   Diagnosis Date    CAD (coronary artery disease)     S/P CABG follows with PCP    HTN (hypertension)     Hyperlipidemia     NSTEMI (non-ST elevated myocardial infarction) (HCC)     Rectal cancer (HCC)     Tobacco abuse, in remission      Past Surgical History:   Procedure Laterality Date    BLADDER SURGERY N/A 6/28/2021    CYSTOSCOPY RETROGRADE PYELOGRAM, CLOT EVACUATION performed by Abe Hines MD at [facility] OR    COLONOSCOPY      CORONARY ARTERY BYPASS GRAFT N/A 12/10/2020    CORONARY ARTERY BYPASS, BELINDA performed by Andi Duenas DO at [facility] [place]    CYSTOSCOPY N/A 9/2/2022    CYSTOSCOPY RETROGRADE PYELOGRAM URETHRAL DILATION, SHIRLEY PLACEMENT, DIGITAL RECTAL EXAM UNDER ANETHESIA performed by Cole Ramirez DO at [facility] OR    HEMORRHOID SURGERY      INGUINAL HERNIA REPAIR Left     RECTAL SURGERY      TURP N/A 2/12/2021    CYSTOSCOPY RETROGRADE PYELOGRAM PLASMA LOOP TRANSURETHRAL RESECTION PROSTATE performed by Cole Ramirez DO at [facility] OR       SUBJECTIVE:    Precautions: Up with assistance, falls and UTI      Social history: Pt lives with spouse; single family home, Two story (Stair lift), 5+1 to enter with rails, walk-in shower with grab bars and bench.     Equipment owned: Cane    AM-[facility] Basic Mobility       AM-PAC Basic Mobility - Inpatient   How much help is needed turning from your back to your side while in a flat bed without using bedrails?: A Little  How much help is needed moving from lying on your back to sitting on the side of a flat bed without using bedrails?: A Little  How much help is needed moving to and from a bed to a chair?: A Little  How much help is needed standing up from a chair using your arms?: A Little  How much help is needed walking in hospital room?: A Little  How much help is needed climbing 3-5 steps with a railing?: A Little  AM-[facility] Inpatient Mobility Raw Score : 18  AM-PAC  Inpatient T-Scale Score : 43.63  Mobility Inpatient CMS 0-100% Score: 46.58  Mobility Inpatient CMS G-Code Modifier : CK    Nursing cleared patient for PT evaluation. The admitting diagnosis and active problem list as listed above have been reviewed prior to the initiation of this evaluation.    OBJECTIVE:   Initial Evaluation  Date: 11/1/2024 Treatment Date:     Short Term/ Long Term   Goals   Was pt agreeable to Eval/treatment? Yes  To be met in 2 days   Pain level   0/10       Bed Mobility  Using rails and head of bed elevated:     Rolling: Not assessed patient in chair    Supine to sit: Not assessed patient in chair    Sit to supine: Not assessed patient in chair    Scooting: Not assessed patient in chair    Rolling: Independent    Supine to sit: Independent    Sit to supine: Independent    Scooting: Independent     Transfers Sit to stand: Supervision    Sit to stand: Independent    Ambulation     2 x 100 feet using  no device with Supervision    for balance and safety    > 200 feet using  no device with Independent    Stair negotiation: ascended and descended 5 steps, with rail with Joseline    5 steps, with rail with close supervision   ROM Within functional limits    Increase range of motion 10% of affected joints    Strength BUE:  refer to OT eval  RLE:  4-/5  LLE:   4-/5  Increase strength in affected mm groups by 1/3 grade   Balance Sitting EOB:  good -  Dynamic Standing:  fair+ with no AD  Sitting EOB:  good   Dynamic Standing: good- with no AD     Patient is Alert & Oriented x person, place, time, and situation and follows directions    Sensation:  Patient  denies numbness/tingling   Edema:  no   Endurance: fair +    Vitals: room air   Blood Pressure at rest  Blood Pressure during session    Heart Rate at rest  Heart Rate during session    SPO2 at rest %  SPO2 during session %     Patient education  Patient educated on role of Physical Therapy, risks of immobility, safety and plan of care, energy

## 2024-11-01 NOTE — CARE COORDINATION
Ss note:11/1/2024 11:50 AM Plan is home with wife, PTA pt independent, on room air, pt follows with Dr. Ramirez for Urology, pt currently with a cheney. No hx of HHC or SNF. Per ID pt to discharge on oral omnicef. Uses [facility] in [place]. Wife to transport home. YOVANY May

## 2024-11-01 NOTE — CONSULTS
[address] [address]                              CONSULTATION      PATIENT NAME: JUDY RAMSAY              : 1945  MED REC NO: [id number]                        ROOM: [id number]  ACCOUNT NO: [id number]                       ADMIT DATE: 10/30/2024  PROVIDER: Brijesh Rush MD      CONSULT DATE: 2024    REASON FOR CONSULTATION:  Proteinuria and renal insufficiency.    HISTORY OF PRESENT ILLNESS:  The patient is being seen in consultation at the request of Dr. Kang.  The patient is a 79-year-old gentleman with underlying history of anorectal carcinoma, treated with radiation therapy and chemotherapy.  The patient does have history of urinary retention and has had followup with Urology.  The patient presented with chief complaints of feeling fatigued and tired.  Upon presentation, the patient had a serum creatinine of 2.4 mg/dL.  His baseline serum creatinine is anywhere from 0.9 to 1.3 mg/dL.  A Estrada catheter was placed.  The patient's serum creatinine is down to 1.4 mg/dL today.  Incidentally, he has had issues with voiding.  He has had urinary incontinence and wears adult diapers.  He has followed up with Urology and has been deemed to have urinary obstruction, possibly due to history of radiotherapy, but as well as a history of benign prostatic hypertrophy.  Although, the patient has status post transurethral resection of the prostate in .  He apparently has urethral strictures.  He has been found to have proteinuria.  When seen up on the floor, the patient is denying any shortness of breath.  He is still complaining of abdominal discomfort and urinary incontinence.  He has some peripheral edema.    PAST MEDICAL HISTORY:  Significant for history of anorectal carcinoma, treated with radiation therapy and chemotherapy.  He has history of hypertension, hyperlipidemia, atherosclerotic heart disease with history of

## 2024-11-01 NOTE — CONSULTS
Patient seen and examined.    Consult dictated.      Patient is a 79-year-old gentleman with history of rectal carcinoma treated with radiation therapy and chemotherapy.  Patient does have history of urinary obstruction with urethral stricture as well as benign prostatic hypertrophy status post TURP.    Patient did have acute kidney injury which is resolving.  Patient does have proteinuria which is possibly sent to hypertensive kidney disease but given his history of anorectal carcinoma we need to consider possibility of a glomerular pathology such as membranous nephropathy.  Will quantify the magnitude of proteinuria before proceeding.  Patient does have significant metabolic acidosis with normal anion gap which is of undetermined etiology.  Will repeat labs to verify the accuracy of the labs before proceeding with further workup.          Dr. Kang, Abe KOCH DO  , Thank You for allowing me to participate in the care of this patient.  Will follow the patient with you.    Brijesh Rush MD  Nephrology    Electronically signed by Brijesh Rush MD on 11/1/2024 at 2:16 PM

## 2024-11-01 NOTE — PROGRESS NOTES
11/1/2024 12:00 PM  Janet Rivera  [id number]    Subjective:  sitting up in a chair   Cheney with cloudy purulent urine       Review of Systems  Constitutional: No fever or chills   Respiratory: negative for cough and hemoptysis  Cardiovascular: negative for chest pain and dyspnea  Gastrointestinal: negative for abdominal pain, diarrhea, nausea and vomiting   : See above  Derm: negative for rash and skin lesion(s)  Neurological: negative for seizures and tremors  Musculoskeletal: Negative    Psychiatric: Negative   All other reviews are negative      Scheduled Meds:   cefdinir  300 mg Oral BID    cholestyramine  1 packet Oral BID    lactobacillus  1 capsule Oral BID    folic acid  1 mg Oral Daily    ferric gluconate (FERRLECIT) 125 mg in sodium chloride 0.9 % 100 mL IVPB  125 mg IntraVENous Once    atorvastatin  40 mg Oral Nightly    vitamin D3  5,000 Units Oral Daily    [Held by provider] clopidogrel  75 mg Oral Daily    [Held by provider] aspirin  81 mg Oral Daily    levothyroxine  50 mcg Oral Daily    metoprolol tartrate  12.5 mg Oral BID    tamsulosin  0.4 mg Oral Daily    pantoprazole  40 mg Oral QAM AC       Objective:  Vitals:    11/01/24 0945   BP: (!) 100/50   Pulse: 62   Resp:    Temp:    SpO2:          Allergies: Adhesive tape and Cipro [ciprofloxacin hcl]    General Appearance: alert and oriented to person, place and time and in no acute distress  Skin: no rash or erythema  Head: normocephalic and atraumatic  Pulmonary/Chest: normal air movement, no respiratory distress  Abdomen: soft, non-tender, non-distended  Genitourinary: cheney with purulent yellow urine   Extremities: no cyanosis, clubbing or edema         Labs:     Recent Labs     11/01/24  0633   *   K 4.6      CO2 13*   BUN 28*   CREATININE 1.4*   GLUCOSE 77   CALCIUM 8.3*       Lab Results   Component Value Date/Time    HGB 9.0 11/01/2024 06:33 AM    HCT 30.1 11/01/2024 06:33 AM       Lab Results   Component Value Date    PSA  0.30 12/12/2020         Assessment/Plan:  Hx of XRT for anal cancer   BPH s/p TURP 2021  Urethral strictures   MAYRA      Creatinine trending down, 2.4>>1.4  Antibiotics per ID  Renal ultrasound without hydronephrosis   Continue the cheney   Continue the Flomax   Voiding trial tomorrow morning     Sarah Varela, APRN - CNP   [facility]  Urology

## 2024-11-01 NOTE — PROGRESS NOTES
Date:  11/01/24   Hospital Day: 3  PT Name:  Janet Rivera  MRN:   [id number]  Primary Care Physician: Sumeet Nieto DO  Requesting physician:   Abe Kang DO  Reason for follow up: antibiotics/infection  Chief Complaint:   Chief Complaint   Patient presents with    Fatigue     Pt states he went to get covid test (neg) has no taste or appetite. Generalized weakness.        Assessment  Janet Rivera is a 79 y.o. year old male who presented on 10/30/2024 and is being treated for MAYRA (acute kidney injury) (Formerly [facility])   Chief Complaint   Patient presents with    Fatigue     Pt states he went to get covid test (neg) has no taste or appetite. Generalized weakness.       Id following for   Plan  Eval fo sepsis  Leukocytosis resolved   Mayra cr1.4   Complicated Uti  previous bactrim   Diarrhea questran probiotics  H/o anal CA with xrt     Check final cx  Us pending      cefTRIAXone (ROCEPHIN) 1,000 mg in sterile water 10 mL IV syringe, Q24H change to po omnicef       Spoke with micro poss alpha strep may be  not Enterococcus        Watch for cdiff colitis/clabsi-line infection  Monitor labs  Continue current therapy.  Please see orders for further management and care.    Subjective/HPI:  Janet was seen and examined at bedside today for atbx    Overnight:  DOS  11/01/24 c/o diarrhea     There are no adverse drug reactions.  All tests were reviewed.   Wife  family present during my examination.    ROS:   Pt denies  fevers/chills  Pt denies nausea/vomiting/diarrhea  Pt denies chest pain  Pt denies sob/cough  Pt denies rash/itch  Pt denies joint aches or pain  Pt denies urinary complaints  Pt denies leg or calf pain  Pt denies easy bruising or bleeding     Objective  Most Recent Recorded Vitals  Vitals:    11/01/24 0945   BP: (!) 100/50   Pulse: 62   Resp:    Temp:    SpO2:      I/O last 3 completed shifts:  In: 920 [P.O.:920]  Out: 2200 [Urine:2200]  I/O this shift:  In: -   Out: 250 [Urine:250]  Physical  Special Requests          Culture NO GROWTH 1 DAY    Culture, Blood 2 [[id number]] Collected: 10/30/24 1924    Order Status: Completed Specimen: Blood Updated: 10/31/24 2306     Specimen Description .BLOOD     Special Requests          Culture NO GROWTH 1 DAY          Recent Labs     10/30/24  1743 10/31/24  0559 11/01/24  0633   AST 27 26 35   ALT 22 19 20     Lab Results   Component Value Date/Time    CHOL 84 10/31/2024 05:59 AM    TRIG 71 10/31/2024 05:59 AM    HDL 32 10/31/2024 05:59 AM     Lab Results   Component Value Date/Time    VITD25 38.9 10/31/2024 05:59 AM     Recent Labs     10/30/24  1743 10/31/24  0559 11/01/24  0633   WBC 17.1* 13.9* 9.5   HGB 10.5* 9.5* 9.0*   HCT 31.9* 29.5* 30.1*    211 174   MCV 87.6 91.0 100.0*   MCH 28.8 29.3 29.9   MCHC 32.9 32.2 29.9*   RDW 14.4 14.4 14.5   LYMPHOPCT 3* 9* 12*   MONOPCT 10 10 12   EOSPCT 0 1 2   BASOPCT 0 0 0   MONOSABS 1.78* 1.36* 1.12*   LYMPHSABS 0.45* 1.18* 1.11*   EOSABS 0.00* 0.09 0.14   BASOSABS 0.00 0.03 0.03     Recent Labs     10/30/24  1743 10/31/24  0559 11/01/24  0633    134 129*   K 4.9 4.4 4.6   CL 99 102 106   CO2 22 21* 13*   BUN 43* 37* 28*   CREATININE 2.4* 1.9* 1.4*   LABGLOM 28* 36* 50*   GLUCOSE 95 83 77   CALCIUM 9.3 8.7 8.3*   BILITOT 0.4 0.3 0.4   ALKPHOS 144* 124 125   AST 27 26 35   ALT 22 19 20     U/A:    Lab Results   Component Value Date/Time    COLORU Yellow 10/30/2024 08:00 PM    PROTEINU 100 10/30/2024 08:00 PM    PHUR 6.5 10/30/2024 08:00 PM    PHUR 6.5 12/08/2022 07:50 PM    WBCUA GREATER THAN 100 10/30/2024 08:00 PM    RBCUA GREATER THAN 100 10/30/2024 08:00 PM    BACTERIA 1+ 10/30/2024 08:00 PM    CLARITYU TURBID 12/08/2022 07:50 PM    LEUKOCYTESUR MODERATE 10/30/2024 08:00 PM    UROBILINOGEN 0.2 10/30/2024 08:00 PM    BILIRUBINUR SMALL 10/30/2024 08:00 PM    BLOODU LARGE 12/08/2022 07:50 PM    GLUCOSEU NEGATIVE 10/30/2024 08:00 PM    AMORPHOUS MANY 12/08/2020 01:30 PM         US RETROPERITONEAL

## 2024-11-01 NOTE — PROGRESS NOTES
Internal Medicine Consult Note    [facility]    Abe Kang D.O., ANASTACIOI.                    Shan Wright D.O., GOMEZ Camacho D.O.     Darline Kasper, MSN, APRN, NP-C  Dwain Carver, MSN, APRN-CNP  Loyd Dugan, MSN, APRN-CNP  Maryam Hoang, MSN APRN-CNP  Kyara Baron, MSN. APRN-NP-C     Primary Care Physician: Sumeet Nieto DO   Admitting Physician:  Abe Kang DO  Admission date and time: 10/30/2024  4:27 PM    Room:  [address] [place] [address]  Admitting diagnosis: MAYRA (acute kidney injury) (HCC) [N17.9]  Acute cystitis with hematuria [N30.01]  Acute renal failure, unspecified acute renal failure type (HCC) [N17.9]    Patient Name: Janet Rivera  MRN: [id number]    Date of Service: 11/1/2024     Subjective:  Janet is a 79 y.o. male who was seen and examined today,11/1/2024, at the bedside.  Patient patient is resting comfortably in bed.  He currently has no family visiting at this time.  He does have a Estrada catheter.  Will continue Flomax and urology is following. renal function is slowly improving.    No family was present during my examination.    Review of System:   Constitutional:   Denies fever or chills, weight loss or gain, fatigue or malaise.  HEENT:   Denies ear pain, sore throat, sinus or eye problems.  Cardiovascular:   Denies any chest pain, irregular heartbeats, or palpitations.   Respiratory:   Denies shortness of breath, coughing, sputum production, hemoptysis, or wheezing.  Gastrointestinal:   Denies nausea, vomiting, diarrhea, or constipation.  Denies any abdominal pain.  Genitourinary:    Estrada catheter   extremities:   Denies lower extremity swelling, edema or cyanosis.   Neurology:    Denies any headache or focal neurological deficits, Denies generalized weakness or memory difficulty.   Psch:   Denies being anxious or depressed.  Musculoskeletal:    Denies  myalgias, joint complaints or back pain.   Integumentary:  function slowly improving  Appreciate subspecialist input  Follow appropriate lab    More than 50% of my  time was spent at the bedside counseling/coordinating care with the patient and/or family with face to face contact.  This time was spent reviewing notes and laboratory data as well as instructing and counseling the patient. Time I spent with the family or surrogate(s) is included only if the patient was incapable of providing the necessary information or participating in medical decisions. I also discussed the differential diagnosis and all of the proposed management plans with the patient and individuals accompanying the patient.    The patient was seen, examined and then discussed with Dr. Kang.      ARGENTINA Leiva CNP  11/1/2024  6:50 PM       I saw and evaluated the patient. I agree with the findings and the plan of care as documented in Loyd ELAINE-CNP note.    Abe Kang DO, FACOI  6:58 PM  11/1/2024

## 2024-11-02 VITALS
OXYGEN SATURATION: 96 % | TEMPERATURE: 97.5 F | DIASTOLIC BLOOD PRESSURE: 72 MMHG | HEIGHT: 70 IN | RESPIRATION RATE: 18 BRPM | HEART RATE: 55 BPM | BODY MASS INDEX: 26.36 KG/M2 | WEIGHT: 184.1 LBS | SYSTOLIC BLOOD PRESSURE: 131 MMHG

## 2024-11-02 LAB
ALBUMIN SERPL-MCNC: 2.2 G/DL (ref 3.5–5.2)
ALP SERPL-CCNC: 109 U/L (ref 40–129)
ALT SERPL-CCNC: 20 U/L (ref 0–40)
ANION GAP SERPL CALCULATED.3IONS-SCNC: 12 MMOL/L (ref 7–16)
AST SERPL-CCNC: 31 U/L (ref 0–39)
BASOPHILS # BLD: 0.04 K/UL (ref 0–0.2)
BASOPHILS NFR BLD: 1 % (ref 0–2)
BILIRUB SERPL-MCNC: 0.2 MG/DL (ref 0–1.2)
BUN SERPL-MCNC: 18 MG/DL (ref 6–23)
CALCIUM SERPL-MCNC: 8.2 MG/DL (ref 8.6–10.2)
CHLORIDE SERPL-SCNC: 109 MMOL/L (ref 98–107)
CO2 SERPL-SCNC: 16 MMOL/L (ref 22–29)
CREAT SERPL-MCNC: 1.3 MG/DL (ref 0.7–1.2)
EOSINOPHIL # BLD: 0.24 K/UL (ref 0.05–0.5)
EOSINOPHILS RELATIVE PERCENT: 3 % (ref 0–6)
ERYTHROCYTE [DISTWIDTH] IN BLOOD BY AUTOMATED COUNT: 14.6 % (ref 11.5–15)
GFR, ESTIMATED: 59 ML/MIN/1.73M2
GLUCOSE SERPL-MCNC: 79 MG/DL (ref 74–99)
HCT VFR BLD AUTO: 28 % (ref 37–54)
HGB BLD-MCNC: 9.1 G/DL (ref 12.5–16.5)
IMM GRANULOCYTES # BLD AUTO: 0.07 K/UL (ref 0–0.58)
IMM GRANULOCYTES NFR BLD: 1 % (ref 0–5)
LYMPHOCYTES NFR BLD: 1.41 K/UL (ref 1.5–4)
LYMPHOCYTES RELATIVE PERCENT: 16 % (ref 20–42)
MAGNESIUM SERPL-MCNC: 2 MG/DL (ref 1.6–2.6)
MCH RBC QN AUTO: 29.3 PG (ref 26–35)
MCHC RBC AUTO-ENTMCNC: 32.5 G/DL (ref 32–34.5)
MCV RBC AUTO: 90 FL (ref 80–99.9)
MONOCYTES NFR BLD: 0.98 K/UL (ref 0.1–0.95)
MONOCYTES NFR BLD: 11 % (ref 2–12)
NEUTROPHILS NFR BLD: 69 % (ref 43–80)
NEUTS SEG NFR BLD: 6.13 K/UL (ref 1.8–7.3)
PHOSPHATE SERPL-MCNC: 2.5 MG/DL (ref 2.5–4.5)
PLATELET # BLD AUTO: 211 K/UL (ref 130–450)
PMV BLD AUTO: 11.1 FL (ref 7–12)
POTASSIUM SERPL-SCNC: 4.3 MMOL/L (ref 3.5–5)
PROT SERPL-MCNC: 5.9 G/DL (ref 6.4–8.3)
RBC # BLD AUTO: 3.11 M/UL (ref 3.8–5.8)
SODIUM SERPL-SCNC: 137 MMOL/L (ref 132–146)
WBC OTHER # BLD: 8.9 K/UL (ref 4.5–11.5)

## 2024-11-02 PROCEDURE — 36415 COLL VENOUS BLD VENIPUNCTURE: CPT

## 2024-11-02 PROCEDURE — 84100 ASSAY OF PHOSPHORUS: CPT

## 2024-11-02 PROCEDURE — 83735 ASSAY OF MAGNESIUM: CPT

## 2024-11-02 PROCEDURE — 6370000000 HC RX 637 (ALT 250 FOR IP): Performed by: SPECIALIST

## 2024-11-02 PROCEDURE — 51798 US URINE CAPACITY MEASURE: CPT

## 2024-11-02 PROCEDURE — 2500000003 HC RX 250 WO HCPCS: Performed by: INTERNAL MEDICINE

## 2024-11-02 PROCEDURE — 85025 COMPLETE CBC W/AUTO DIFF WBC: CPT

## 2024-11-02 PROCEDURE — 2580000003 HC RX 258

## 2024-11-02 PROCEDURE — 80053 COMPREHEN METABOLIC PANEL: CPT

## 2024-11-02 PROCEDURE — 6370000000 HC RX 637 (ALT 250 FOR IP)

## 2024-11-02 RX ADMIN — ANTI-FUNGAL POWDER MICONAZOLE NITRATE TALC FREE: 1.42 POWDER TOPICAL at 12:59

## 2024-11-02 RX ADMIN — TAMSULOSIN HYDROCHLORIDE 0.4 MG: 0.4 CAPSULE ORAL at 08:55

## 2024-11-02 RX ADMIN — LEVOTHYROXINE SODIUM 50 MCG: 0.05 TABLET ORAL at 06:01

## 2024-11-02 RX ADMIN — PANTOPRAZOLE SODIUM 40 MG: 40 TABLET, DELAYED RELEASE ORAL at 06:01

## 2024-11-02 RX ADMIN — CHOLESTYRAMINE 4 G: 4 POWDER, FOR SUSPENSION ORAL at 08:55

## 2024-11-02 RX ADMIN — FOLIC ACID 1 MG: 1 TABLET ORAL at 08:55

## 2024-11-02 RX ADMIN — CHOLECALCIFEROL TAB 125 MCG (5000 UNIT) 5000 UNITS: 125 TAB at 08:55

## 2024-11-02 RX ADMIN — CEFDINIR 300 MG: 300 CAPSULE ORAL at 08:55

## 2024-11-02 RX ADMIN — SODIUM CHLORIDE: 9 INJECTION, SOLUTION INTRAVENOUS at 06:24

## 2024-11-02 RX ADMIN — Medication 1 CAPSULE: at 08:55

## 2024-11-02 NOTE — PROGRESS NOTES
Date:  11/02/24   Hospital Day: 4  PT Name:  Janet Rivera  MRN:   [id number]  Primary Care Physician: Sumeet Nieto DO  Requesting physician:   Abe Kang DO  Reason for follow up: antibiotics/infection  Chief Complaint:   Chief Complaint   Patient presents with    Fatigue     Pt states he went to get covid test (neg) has no taste or appetite. Generalized weakness.        Assessment  Janet Rivera is a 79 y.o. year old male who presented on 10/30/2024 and is being treated for MAYRA (acute kidney injury) (HCC)   Chief Complaint   Patient presents with    Fatigue     Pt states he went to get covid test (neg) has no taste or appetite. Generalized weakness.       Id following for   Plan  Leukocytosis resolved   Mayra cr1.4   Complicated Uti  previous bactrim   Diarrhea questran probiotics  H/o anal CA with xrt      Plan:  Discussed with    Currently on omnicef  Monitor labs- creat- 1.3   Continue with questran and probiotics for loose stools   Cheney removed- needs to void       ARGENTINA Drake - CNS  11/2/2024      Subjective/HPI:  Janet was seen and examined at bedside today for atbx    Overnight:  DOS  11/02/24- patient reports loose stools- cheney has been removed.   Patient seems depressed today   Family at bedside.     11/1/2024- c/o diarrhea     There are no adverse drug reactions.  All tests were reviewed.   Wife  family present during my examination.    ROS:   Pt denies  fevers/chills  Pt denies nausea/vomiting/diarrhea  Pt denies chest pain  Pt denies sob/cough  Pt denies rash/itch  Pt denies joint aches or pain  Pt denies urinary complaints  Pt denies leg or calf pain  Pt denies easy bruising or bleeding     Objective  Most Recent Recorded Vitals  Vitals:    11/02/24 0845   BP:    Pulse: 55   Resp:    Temp:    SpO2:      I/O last 3 completed shifts:  In: 320 [P.O.:320]  Out: 2300 [Urine:2300]  No intake/output data recorded.  Physical Exam:  General:  AAO to person, place,

## 2024-11-02 NOTE — PROGRESS NOTES
Final Result   Unremarkable ultrasound of the kidneys.         XR CHEST (2 VW)   Final Result   Chronic findings without appreciable acute process.               Labs:    CBC:   Recent Labs     10/31/24  0559 11/01/24  0633 11/02/24  0612   WBC 13.9* 9.5 8.9   HGB 9.5* 9.0* 9.1*    174 211        Lab Results   Component Value Date    IRON 16 (L) 10/31/2024    TIBC 158 (L) 10/31/2024       Lab Results   Component Value Date    CALCIUM 8.2 (L) 11/02/2024    CALCIUM 8.2 (L) 11/01/2024    CALCIUM 8.3 (L) 11/01/2024    PHOS 2.5 11/02/2024    PHOS 2.5 11/01/2024    PHOS 2.9 10/31/2024    MG 2.0 11/02/2024    MG 2.6 11/01/2024    MG 2.1 10/31/2024       BMP:   Recent Labs     11/01/24  0633 11/01/24  1432 11/02/24  0612   * 135 137   K 4.6 4.4 4.3    108* 109*   CO2 13* 19* 16*   BUN 28* 25* 18   CREATININE 1.4* 1.4* 1.3*   GLUCOSE 77 106* 79             Labs:  CBC with Differential:    Lab Results   Component Value Date/Time    WBC 8.9 11/02/2024 06:12 AM    RBC 3.11 11/02/2024 06:12 AM    HGB 9.1 11/02/2024 06:12 AM    HCT 28.0 11/02/2024 06:12 AM     11/02/2024 06:12 AM    MCV 90.0 11/02/2024 06:12 AM    MCH 29.3 11/02/2024 06:12 AM    MCHC 32.5 11/02/2024 06:12 AM    RDW 14.6 11/02/2024 06:12 AM    LYMPHOPCT 16 11/02/2024 06:12 AM    MONOPCT 11 11/02/2024 06:12 AM    EOSPCT 3 11/02/2024 06:12 AM    BASOPCT 1 11/02/2024 06:12 AM    MONOSABS 0.98 11/02/2024 06:12 AM    LYMPHSABS 1.41 11/02/2024 06:12 AM    EOSABS 0.24 11/02/2024 06:12 AM    BASOSABS 0.04 11/02/2024 06:12 AM     BMP:    Lab Results   Component Value Date/Time     11/02/2024 06:12 AM    K 4.3 11/02/2024 06:12 AM    K 4.1 07/19/2022 09:27 AM     11/02/2024 06:12 AM    CO2 16 11/02/2024 06:12 AM    BUN 18 11/02/2024 06:12 AM    CREATININE 1.3 11/02/2024 06:12 AM    CALCIUM 8.2 11/02/2024 06:12 AM    GFRAA >60 07/21/2022 04:53 AM    LABGLOM 59 11/02/2024 06:12 AM    LABGLOM >60 12/08/2022 06:19 PM    GLUCOSE 79  130s over 50s to 70s  -Patient is on Lopressor 12.5 mg twice daily  -Given proteinuria, may benefit from RAAS inhibition, would explore in outpatient setting as BP allows    4-Electrolyte/Acid base disorders  -He has a nonanion gap metabolic acidosis in the setting of normal saline infusion  -His hyponatremia, likely hypovolemic in etiology has resolved with IV fluids  -His CO2 is at 16, would hold off on bicarbonate therapy at this time, would monitor labs as an outpatient    5-CKD-MBD  -Calcium 8.2 mg/dL  -Phosphorus 2.5 mg/dL  -Magnesium 2.0 mg/dL  -All of the above are adequate, the serum calcium taking into account hypoalbuminemia when corrected for such is normal    6-Anemia/Heme  -Hemoglobin 9.1 g/dL, patient has chronic anemia at baseline  -Iron deficiency noted being treated with IV iron    Liam Bustillos MD  12:15 PM  11/2/2024    Electronically signed by Liam Bustillos MD on 11/2/2024 at 12:14 PM

## 2024-11-02 NOTE — DISCHARGE INSTRUCTIONS
Your information:  Name: Janet Rivera  : 1945    Your instructions:    YOU ARE BEING DISCHARGED HOME. PLEASE MAKE AND KEEP YOUR FOLLOW UP APPOINTMENTS.    What to do after you leave the hospital:    Recommended diet: regular diet    Recommended activity: activity as tolerated    IF YOU EXPERIENCE ANY OF THE FOLLOWING SYMPTOMS, CHEST PAIN, SHORTNESS OF BREATH, COUGHING UP BLOOD OR BLOODY SPUTUM, STOMACH PAIN OR CRAMPING, DARK, TARRY STOOLS, LOSS OF APPETITE, GENERAL NOT FEELING WELL, SIGNS AND SYMPTOMS OF INFECTION LIKE FEVER AND OR CHILLS, PLEASE CALL Sumeet Paulson,  OR RETURN TO THE EMERGENCY ROOM.    The following personal items were collected during your admission and were returned to you:    Belongings  Dental Appliances: None  Vision - Corrective Lenses: None  Hearing Aid: None  Clothing: Footwear, Shirt, Socks, Undergarments, Pants, At bedside  Jewelry: Ring  Body Piercings Removed: No  Electronic Devices: None  Weapons (Notify Protective Services/Security): None  Other Valuables: Wallet, Sent home  Home Medications: None  Valuables Given To: Family (Comment)  Provide Name(s) of Who Valuable(s) Were Given To: Rachelle  Patient approves for provider to speak to responsible person post operatively: Yes    Information obtained by:  By signing below, I understand that if any problems occur once I leave the hospital I am to contact  Sumeet Nieto DO or go to emergency room.  I understand and acknowledge receipt of the instructions indicated above.

## 2024-11-02 NOTE — PLAN OF CARE
Problem: Safety - Adult  Goal: Free from fall injury  11/1/2024 2256 by Amy Yao, RN  Outcome: Progressing  11/1/2024 1544 by Cass Garcia, RN  Outcome: Progressing     Problem: Pain  Goal: Verbalizes/displays adequate comfort level or baseline comfort level  Outcome: Progressing     Problem: Discharge Planning  Goal: Discharge to home or other facility with appropriate resources  Outcome: Progressing

## 2024-11-02 NOTE — PLAN OF CARE
Problem: Safety - Adult  Goal: Free from fall injury  11/2/2024 1137 by Adela Thomason, RN  Outcome: Progressing     Problem: Pain  Goal: Verbalizes/displays adequate comfort level or baseline comfort level  11/2/2024 1137 by Adela Thomason, RN  Outcome: Progressing

## 2024-11-02 NOTE — PLAN OF CARE
Problem: Safety - Adult  Goal: Free from fall injury  11/2/2024 1544 by Adela Thomason RN  Outcome: Adequate for Discharge  11/2/2024 1137 by Adela Thomason RN  Outcome: Progressing     Problem: Pain  Goal: Verbalizes/displays adequate comfort level or baseline comfort level  11/2/2024 1544 by Adela Thomason RN  Outcome: Adequate for Discharge  11/2/2024 1137 by Adela Thomason RN  Outcome: Progressing     Problem: Discharge Planning  Goal: Discharge to home or other facility with appropriate resources  11/2/2024 1544 by Adela Thomason RN  Outcome: Adequate for Discharge  11/2/2024 1137 by Adela Thomason RN  Outcome: Progressing

## 2024-11-03 LAB
MICROORGANISM SPEC CULT: NORMAL
MICROORGANISM SPEC CULT: NORMAL
SERVICE CMNT-IMP: NORMAL
SERVICE CMNT-IMP: NORMAL
SPECIMEN DESCRIPTION: NORMAL
SPECIMEN DESCRIPTION: NORMAL

## 2024-11-03 NOTE — DISCHARGE SUMMARY
Dictate [id number]  
feet 10 inches, weight 184.  The patient was discharged on Omnicef 300 mg q.12 for 14 days, lactobacillus 1 q.12.  The patient will continue atorvastatin 40 mg daily, Plavix 75 daily, Colace 100 q.12, magnesium oxide 400 daily, metoprolol 25 mg half a tablet daily, oxybutynin 5 mg b.i.d., Synthroid 50 mcg daily, Flomax 0.4 mg at bedtime, vitamin D 5000 units daily.  The patient will follow up with his primary care doctor.  The patient will follow up with Dr. Nieto with repeat lab work in 1 week along with Dr. Ramirez and Nephrology as indicated.  More than 40 minutes was spent on the day of discharge with more than 50% of time spent face-to-face with the patient, discussing plan of care and treatment at this time.          JAYNE BRINK DO      D:  11/02/2024 15:26:07     T:  11/03/2024 00:33:19     ALEIDA/TESSA  Job #:  [id number]     Doc#:  [id number]

## 2024-11-20 ENCOUNTER — HOSPITAL ENCOUNTER (OUTPATIENT)
Age: 79
Discharge: HOME | End: 2024-11-20
Payer: MEDICARE

## 2024-11-20 LAB
ANION GAP SERPL CALCULATED.3IONS-SCNC: 12 MMOL/L (ref 7–16)
BACTERIA URNS QL MICRO: ABNORMAL
BILIRUB UR QL STRIP: ABNORMAL
BUN SERPL-MCNC: 17 MG/DL (ref 6–23)
CALCIUM SERPL-MCNC: 9.3 MG/DL (ref 8.6–10.2)
CHLORIDE SERPL-SCNC: 101 MMOL/L (ref 98–107)
CLARITY UR: ABNORMAL
CO2 SERPL-SCNC: 23 MMOL/L (ref 22–29)
COLOR UR: YELLOW
CREAT SERPL-MCNC: 1.8 MG/DL (ref 0.7–1.2)
CREAT UR-MCNC: 188.7 MG/DL (ref 40–278)
ERYTHROCYTE [DISTWIDTH] IN BLOOD BY AUTOMATED COUNT: 15.1 % (ref 11.5–15)
GFR, ESTIMATED: 39 ML/MIN/1.73M2
GLUCOSE SERPL-MCNC: 104 MG/DL (ref 74–99)
GLUCOSE UR STRIP-MCNC: NEGATIVE MG/DL
HCT VFR BLD AUTO: 34.5 % (ref 37–54)
HGB BLD-MCNC: 10.7 G/DL (ref 12.5–16.5)
HGB UR QL STRIP.AUTO: ABNORMAL
KETONES UR STRIP-MCNC: ABNORMAL MG/DL
LEUKOCYTE ESTERASE UR QL STRIP: ABNORMAL
MAGNESIUM SERPL-MCNC: 1.7 MG/DL (ref 1.6–2.6)
MCH RBC QN AUTO: 29.2 PG (ref 26–35)
MCHC RBC AUTO-ENTMCNC: 31 G/DL (ref 32–34.5)
MCV RBC AUTO: 94 FL (ref 80–99.9)
MICROALBUMIN UR-MCNC: 587 MG/L (ref 0–19)
MICROALBUMIN/CREAT UR-RTO: 311 MCG/MG CREAT (ref 0–30)
NITRITE UR QL STRIP: NEGATIVE
PH UR STRIP: 6 [PH] (ref 5–9)
PHOSPHATE SERPL-MCNC: 2.4 MG/DL (ref 2.5–4.5)
PLATELET # BLD AUTO: 332 K/UL (ref 130–450)
PMV BLD AUTO: 11 FL (ref 7–12)
POTASSIUM SERPL-SCNC: 3.9 MMOL/L (ref 3.5–5)
PROT UR STRIP-MCNC: 100 MG/DL
RBC # BLD AUTO: 3.67 M/UL (ref 3.8–5.8)
RBC #/AREA URNS HPF: ABNORMAL /HPF
SODIUM SERPL-SCNC: 136 MMOL/L (ref 132–146)
SP GR UR STRIP: 1.02 (ref 1–1.03)
UROBILINOGEN UR STRIP-ACNC: 0.2 EU/DL (ref 0–1)
WBC #/AREA URNS HPF: ABNORMAL /HPF
WBC OTHER # BLD: 7.4 K/UL (ref 4.5–11.5)

## 2024-11-20 PROCEDURE — 80048 BASIC METABOLIC PNL TOTAL CA: CPT

## 2024-11-20 PROCEDURE — 36415 COLL VENOUS BLD VENIPUNCTURE: CPT

## 2024-11-20 PROCEDURE — 83735 ASSAY OF MAGNESIUM: CPT

## 2024-11-20 PROCEDURE — 84100 ASSAY OF PHOSPHORUS: CPT

## 2024-11-20 PROCEDURE — 81001 URINALYSIS AUTO W/SCOPE: CPT

## 2024-11-20 PROCEDURE — 82570 ASSAY OF URINE CREATININE: CPT

## 2024-11-20 PROCEDURE — 82043 UR ALBUMIN QUANTITATIVE: CPT

## 2024-11-20 PROCEDURE — 85027 COMPLETE CBC AUTOMATED: CPT

## 2024-11-25 ENCOUNTER — TRANSCRIBE ORDERS (OUTPATIENT)
Dept: ADMINISTRATIVE | Age: 79
End: 2024-11-25

## 2024-11-25 DIAGNOSIS — N18.31 CHRONIC KIDNEY DISEASE, STAGE 3A (HCC): ICD-10-CM

## 2024-11-25 DIAGNOSIS — R80.1 PERSISTENT PROTEINURIA: Primary | ICD-10-CM

## 2024-11-25 DIAGNOSIS — I12.9 BENIGN HYPERTENSIVE KIDNEY DISEASE WITH CHRONIC KIDNEY DISEASE STAGE I THROUGH STAGE IV, OR UNSPECIFIED: ICD-10-CM

## 2024-11-25 DIAGNOSIS — D50.8 OTHER IRON DEFICIENCY ANEMIA: ICD-10-CM

## 2024-12-11 ENCOUNTER — HOSPITAL ENCOUNTER (OUTPATIENT)
Dept: ULTRASOUND IMAGING | Age: 79
Discharge: HOME | End: 2024-12-11
Payer: MEDICARE

## 2024-12-11 ENCOUNTER — HOSPITAL ENCOUNTER (OUTPATIENT)
Age: 79
Discharge: HOME | End: 2024-12-11
Payer: MEDICARE

## 2024-12-11 DIAGNOSIS — I12.9 BENIGN HYPERTENSIVE KIDNEY DISEASE WITH CHRONIC KIDNEY DISEASE STAGE I THROUGH STAGE IV, OR UNSPECIFIED: ICD-10-CM

## 2024-12-11 DIAGNOSIS — D50.8 OTHER IRON DEFICIENCY ANEMIA: ICD-10-CM

## 2024-12-11 DIAGNOSIS — N18.31 CHRONIC KIDNEY DISEASE, STAGE 3A (HCC): ICD-10-CM

## 2024-12-11 DIAGNOSIS — R80.1 PERSISTENT PROTEINURIA: ICD-10-CM

## 2024-12-11 LAB
ANION GAP SERPL CALCULATED.3IONS-SCNC: 11 MMOL/L (ref 7–16)
BUN SERPL-MCNC: 19 MG/DL (ref 6–23)
CALCIUM SERPL-MCNC: 9.2 MG/DL (ref 8.6–10.2)
CHLORIDE SERPL-SCNC: 105 MMOL/L (ref 98–107)
CO2 SERPL-SCNC: 24 MMOL/L (ref 22–29)
CREAT SERPL-MCNC: 1.3 MG/DL (ref 0.7–1.2)
GFR, ESTIMATED: 58 ML/MIN/1.73M2
GLUCOSE SERPL-MCNC: 97 MG/DL (ref 74–99)
POTASSIUM SERPL-SCNC: 3.5 MMOL/L (ref 3.5–5)
SODIUM SERPL-SCNC: 140 MMOL/L (ref 132–146)

## 2024-12-11 PROCEDURE — 86160 COMPLEMENT ANTIGEN: CPT

## 2024-12-11 PROCEDURE — 76775 US EXAM ABDO BACK WALL LIM: CPT | Performed by: PHYSICIAN ASSISTANT

## 2024-12-11 PROCEDURE — 84156 ASSAY OF PROTEIN URINE: CPT

## 2024-12-11 PROCEDURE — 86036 ANCA SCREEN EACH ANTIBODY: CPT

## 2024-12-11 PROCEDURE — 84165 PROTEIN E-PHORESIS SERUM: CPT

## 2024-12-11 PROCEDURE — 86038 ANTINUCLEAR ANTIBODIES: CPT

## 2024-12-11 PROCEDURE — 86039 ANTINUCLEAR ANTIBODIES (ANA): CPT

## 2024-12-11 PROCEDURE — 76770 US EXAM ABDO BACK WALL COMP: CPT

## 2024-12-11 PROCEDURE — 36415 COLL VENOUS BLD VENIPUNCTURE: CPT

## 2024-12-11 PROCEDURE — 84155 ASSAY OF PROTEIN SERUM: CPT

## 2024-12-11 PROCEDURE — 86255 FLUORESCENT ANTIBODY SCREEN: CPT

## 2024-12-11 PROCEDURE — 83516 IMMUNOASSAY NONANTIBODY: CPT

## 2024-12-11 PROCEDURE — 86225 DNA ANTIBODY NATIVE: CPT

## 2024-12-11 PROCEDURE — 80048 BASIC METABOLIC PNL TOTAL CA: CPT

## 2024-12-11 PROCEDURE — 84166 PROTEIN E-PHORESIS/URINE/CSF: CPT

## 2024-12-12 LAB
ANA SER QL IA: NEGATIVE
ANTI DNA DOUBLE STRANDED: NEGATIVE

## 2024-12-13 LAB
ALBUMIN SERPL-MCNC: 3.2 G/DL (ref 3.5–4.7)
ALPHA1 GLOB SERPL ELPH-MCNC: 0.3 G/DL (ref 0.2–0.4)
ALPHA2 GLOB SERPL ELPH-MCNC: 0.9 G/DL (ref 0.5–1)
B-GLOBULIN SERPL ELPH-MCNC: 0.9 G/DL (ref 0.8–1.3)
GAMMA GLOB SERPL ELPH-MCNC: 1.7 G/DL (ref 0.7–1.6)
GBM AB SER-ACNC: 2 AU/ML (ref 0–7)
P E INTERPRETATION, U: NORMAL
PATHOLOGIST: ABNORMAL
PATHOLOGIST: NORMAL
PROT PATTERN SERPL ELPH-IMP: ABNORMAL
PROT SERPL-MCNC: 7.1 G/DL (ref 6.4–8.3)
SPECIMEN TYPE: NORMAL

## 2024-12-14 LAB
C3 SERPL-MCNC: 160 MG/DL (ref 90–180)
C4 SERPL-MCNC: 45 MG/DL (ref 10–40)

## 2024-12-15 LAB
ANCA AB PATTERN SER IF-IMP: ABNORMAL
ANCA IGG TITR SER IF: ABNORMAL {TITER}
DEPRECATED S PNEUM 1 IGG SER-MCNC: 1 AU/ML (ref 0–19)
PLA2R IGG SERPL QL IF: NORMAL
SERINE PROTEASE 3, IGG: 1 AU/ML (ref 0–19)

## 2024-12-26 ENCOUNTER — HOSPITAL ENCOUNTER (OUTPATIENT)
Age: 79
Discharge: HOME | End: 2024-12-26
Payer: MEDICARE

## 2024-12-26 LAB
CREAT UR-MCNC: 55.9 MG/DL (ref 40–278)
TOTAL PROTEIN, URINE: 26 MG/DL (ref 0–12)
URINE TOTAL PROTEIN CREATININE RATIO: 0.47 (ref 0–0.2)

## 2024-12-26 PROCEDURE — 86036 ANCA SCREEN EACH ANTIBODY: CPT

## 2024-12-26 PROCEDURE — 83516 IMMUNOASSAY NONANTIBODY: CPT

## 2024-12-26 PROCEDURE — 36415 COLL VENOUS BLD VENIPUNCTURE: CPT

## 2024-12-26 PROCEDURE — 84156 ASSAY OF PROTEIN URINE: CPT

## 2024-12-26 PROCEDURE — 82570 ASSAY OF URINE CREATININE: CPT

## 2024-12-29 LAB
ANCA AB PATTERN SER IF-IMP: NORMAL
ANCA IGG TITR SER IF: NORMAL {TITER}
DEPRECATED S PNEUM 1 IGG SER-MCNC: 0 AU/ML (ref 0–19)
SERINE PROTEASE 3, IGG: 1 AU/ML (ref 0–19)

## 2025-06-27 ENCOUNTER — HOSPITAL ENCOUNTER (OUTPATIENT)
Age: 80
Discharge: HOME | End: 2025-06-27
Payer: MEDICARE

## 2025-06-27 LAB
25(OH)D3 SERPL-MCNC: 44.6 NG/ML (ref 30–100)
ANION GAP SERPL CALCULATED.3IONS-SCNC: 10 MMOL/L (ref 7–16)
BACTERIA URNS QL MICRO: ABNORMAL
BILIRUB UR QL STRIP: NEGATIVE
BUN SERPL-MCNC: 18 MG/DL (ref 8–23)
CALCIUM SERPL-MCNC: 8.9 MG/DL (ref 8.8–10.2)
CHLORIDE SERPL-SCNC: 106 MMOL/L (ref 98–107)
CLARITY UR: ABNORMAL
CO2 SERPL-SCNC: 22 MMOL/L (ref 22–29)
COLOR UR: YELLOW
CREAT SERPL-MCNC: 1.4 MG/DL (ref 0.7–1.2)
CREAT UR-MCNC: 83.6 MG/DL (ref 40–278)
ERYTHROCYTE [DISTWIDTH] IN BLOOD BY AUTOMATED COUNT: 13.3 % (ref 11.5–15)
FERRITIN SERPL-MCNC: 100 NG/ML
GFR, ESTIMATED: 53 ML/MIN/1.73M2
GLUCOSE SERPL-MCNC: 95 MG/DL (ref 74–99)
GLUCOSE UR STRIP-MCNC: NEGATIVE MG/DL
HCT VFR BLD AUTO: 30.1 % (ref 37–54)
HGB BLD-MCNC: 9.5 G/DL (ref 12.5–16.5)
HGB UR QL STRIP.AUTO: ABNORMAL
IRON SATN MFR SERPL: 14 % (ref 20–55)
IRON SERPL-MCNC: 35 UG/DL (ref 61–157)
KETONES UR STRIP-MCNC: NEGATIVE MG/DL
LEUKOCYTE ESTERASE UR QL STRIP: ABNORMAL
MAGNESIUM SERPL-MCNC: 2.1 MG/DL (ref 1.6–2.4)
MCH RBC QN AUTO: 30.2 PG (ref 26–35)
MCHC RBC AUTO-ENTMCNC: 31.6 G/DL (ref 32–34.5)
MCV RBC AUTO: 95.6 FL (ref 80–99.9)
MICROALBUMIN UR-MCNC: 73 MG/L (ref 0–20)
MICROALBUMIN/CREAT UR-RTO: 87 MCG/MG CREAT (ref 0–30)
NITRITE UR QL STRIP: POSITIVE
PH UR STRIP: 6.5 [PH] (ref 5–8)
PHOSPHATE SERPL-MCNC: 3.1 MG/DL (ref 2.5–4.5)
PLATELET # BLD AUTO: 228 K/UL (ref 130–450)
PMV BLD AUTO: 10.6 FL (ref 7–12)
POTASSIUM SERPL-SCNC: 4.3 MMOL/L (ref 3.5–5.1)
PROT UR STRIP-MCNC: ABNORMAL MG/DL
PTH-INTACT SERPL-MCNC: 69 PG/ML (ref 15–65)
RBC # BLD AUTO: 3.15 M/UL (ref 3.8–5.8)
RBC #/AREA URNS HPF: ABNORMAL /HPF
SODIUM SERPL-SCNC: 138 MMOL/L (ref 136–145)
SP GR UR STRIP: 1.01 (ref 1–1.03)
TIBC SERPL-MCNC: 242 UG/DL (ref 250–450)
URATE SERPL-MCNC: 5.8 MG/DL (ref 3.4–7)
UROBILINOGEN UR STRIP-ACNC: 0.2 EU/DL (ref 0–1)
WBC #/AREA URNS HPF: ABNORMAL /HPF
WBC OTHER # BLD: 10.2 K/UL (ref 4.5–11.5)

## 2025-06-27 PROCEDURE — 85027 COMPLETE CBC AUTOMATED: CPT

## 2025-06-27 PROCEDURE — 84100 ASSAY OF PHOSPHORUS: CPT

## 2025-06-27 PROCEDURE — 82728 ASSAY OF FERRITIN: CPT

## 2025-06-27 PROCEDURE — 82570 ASSAY OF URINE CREATININE: CPT

## 2025-06-27 PROCEDURE — 36415 COLL VENOUS BLD VENIPUNCTURE: CPT

## 2025-06-27 PROCEDURE — 83540 ASSAY OF IRON: CPT

## 2025-06-27 PROCEDURE — 83735 ASSAY OF MAGNESIUM: CPT

## 2025-06-27 PROCEDURE — 83970 ASSAY OF PARATHORMONE: CPT

## 2025-06-27 PROCEDURE — 82306 VITAMIN D 25 HYDROXY: CPT

## 2025-06-27 PROCEDURE — 81001 URINALYSIS AUTO W/SCOPE: CPT

## 2025-06-27 PROCEDURE — 83550 IRON BINDING TEST: CPT

## 2025-06-27 PROCEDURE — 82043 UR ALBUMIN QUANTITATIVE: CPT

## 2025-06-27 PROCEDURE — 80048 BASIC METABOLIC PNL TOTAL CA: CPT

## 2025-06-27 PROCEDURE — 84550 ASSAY OF BLOOD/URIC ACID: CPT

## (undated) DEVICE — MEDICINE CUP, GRADUATED, STER: Brand: MEDLINE

## (undated) DEVICE — CATHETER URET OLV TIP 5FRX70CM FLEXIMA

## (undated) DEVICE — CLIP INT M L GRN TI TRNSVRS GRV CHEVRON SHP W/ PRECIS TIP

## (undated) DEVICE — GOWN,SIRUS,FABRNF,XL,20/CS: Brand: MEDLINE

## (undated) DEVICE — BLOOD TRANSFUSION FILTER: Brand: HAEMONETICS

## (undated) DEVICE — BASIC SINGLE BASIN 1-LF: Brand: MEDLINE INDUSTRIES, INC.

## (undated) DEVICE — GLOVE SURG SZ 6.5 L11.2IN FNGR THK9.8MIL STRW LTX POLYMER

## (undated) DEVICE — CAMERA STRYKER 1488

## (undated) DEVICE — RETROGRADE CARDIOPLEGIA CATHETER: Brand: EDWARDS LIFESCIENCES RETROGRADE CARDIOPLEGIA CATHETER

## (undated) DEVICE — INSUFFLATION TUBING SET WITH FILTER, FUNNEL CONNECTOR AND LUER LOCK: Brand: JOSNOE MEDICAL INC

## (undated) DEVICE — Z INACTIVE USE 2635503 SOLUTION IRRIG 3000ML ST H2O USP UROMATIC PLAS CONT

## (undated) DEVICE — CATHETER URETH BLLN 5CC 20FR F 2 W SPEC COUNCL MOD SHT OPN

## (undated) DEVICE — CYSTO PACK: Brand: MEDLINE INDUSTRIES, INC.

## (undated) DEVICE — Device

## (undated) DEVICE — CONNECTOR DRNGE W3/8-0.5XH3/16XL3/16IN 2:1 SIL CARD STR

## (undated) DEVICE — 3M™ TEGADERM™ CHG DRESSING 25/CARTON 4 CARTONS/CASE 1657: Brand: TEGADERM™

## (undated) DEVICE — GAUZE,SPONGE,4"X4",8PLY,STRL,LF,10/TRAY: Brand: MEDLINE

## (undated) DEVICE — CATHETER URET 5FR L70CM OPN END SGL LUMN INJ HUB FLEXIMA

## (undated) DEVICE — GLOVE ORANGE PI 7 1/2   MSG9075

## (undated) DEVICE — Z INACTIVE USE 2660664 SOLUTION IRRIG 3000ML 0.9% SOD CHL USP UROMATIC PLAS CONT

## (undated) DEVICE — Device: Brand: VIRTUOSAPH PLUS WITH RADIAL INDICATION

## (undated) DEVICE — ALCOHOL 70% RUBBING 16OZ

## (undated) DEVICE — MPS® PRESSURE LINE W/TRANSDUCER: Brand: MPS

## (undated) DEVICE — Z DISCONTINUED USE 2624852 GLOVE SURG 7 PF TEXT NEOPRNE BRN STRL NEOLON 2G LF

## (undated) DEVICE — GLOVE SURG SZ 65 THK91MIL LTX FREE SYN POLYISOPRENE

## (undated) DEVICE — KIT PLT RATIO DISPNS KT 2IN CANN TIP SPRY TIP DISP MAGELLAN

## (undated) DEVICE — HOSE CONN FOR WST MGMT SYS NEPTUNE 2

## (undated) DEVICE — CAMERA STRYKER 1488 HD GEN

## (undated) DEVICE — CAMERA CARDIAC STRYKER 1488 HD

## (undated) DEVICE — DRAINBAG,ANTI-REFLUX TOWER,L/F,2000ML,LL: Brand: MEDLINE

## (undated) DEVICE — PACK OPEN HRT DRP

## (undated) DEVICE — SYRINGE MED 50ML LUERLOCK TIP

## (undated) DEVICE — Z INACTIVE USE 2662641 SOLUTION IV 1000ML 140MEQ/L SOD 5MEQ/L K 3MEQ/L MG 27MEQ/L

## (undated) DEVICE — SET CARDIAC I

## (undated) DEVICE — BAG DRNGE COMB PK

## (undated) DEVICE — PADDLE INTERN DEFIB CHILD

## (undated) DEVICE — TTL1LYR 16FR10ML 100%SIL TMPST TR: Brand: MEDLINE

## (undated) DEVICE — TUBING, SUCTION, 1/4" X 10', STRAIGHT: Brand: MEDLINE

## (undated) DEVICE — MEDIA CONTRAST ISOVUE  300 10X50ML

## (undated) DEVICE — BAG DRAINAGE CONTAINER 15 LT FLUID COLLCTN

## (undated) DEVICE — HF-RESECTION ELECTRODE PLASMALOOP LOOP, LARGE, 24 FR., 12°/16°, ESG TURIS: Brand: OLYMPUS

## (undated) DEVICE — RETRACTOR SURG INSRT SUT HLD OCTOBASE

## (undated) DEVICE — SOLUTION SURG PREP ANTIMICROBIAL 4 OZ SKIN WND EXIDINE

## (undated) DEVICE — SET CARDIAC II

## (undated) DEVICE — BLADE OPHTH 180DEG CUT SURF BLU STR SHRP DBL BVL GRINDLESS

## (undated) DEVICE — GOWN,PREVENTION PLUS,XLN/XL,ST,24/CS: Brand: MEDLINE

## (undated) DEVICE — GAUZE,SPONGE,4"X4",16PLY,XRAY,STRL,LF: Brand: MEDLINE

## (undated) DEVICE — LANCET AORTOTOMY 3.3X10MM

## (undated) DEVICE — TAPE ADH W2INXL10YD PLAS TRNSPAR H2O RESIST HYPOALRG CURAD

## (undated) DEVICE — CONNECTOR PERF W64XH64XL64MM W  LUERLOCK

## (undated) DEVICE — CANNULA INJ L2.5IN BLNT TIP 3MM CLR BODY W/ 1 W VLV DLP

## (undated) DEVICE — Device: Brand: LEVEL 1

## (undated) DEVICE — CARDIAC STRYKER STERNAL SAW

## (undated) DEVICE — SYRINGE MED 20ML STD CLR PLAS LUERSLIP TIP N CTRL DISP

## (undated) DEVICE — SOLUTION IRRIG 3000ML STRL H2O USP UROMATIC PLAS CONT

## (undated) DEVICE — TOWEL,OR,DSP,ST,BLUE,STD,6/PK,12PK/CS: Brand: MEDLINE

## (undated) DEVICE — CLOTH SURG PREP PREOPERATIVE CHLORHEXIDINE GLUC 2% READYPREP

## (undated) DEVICE — SYRINGE,TOOMEY,IRRIGATION,70CC,STERILE: Brand: MEDLINE

## (undated) DEVICE — EZ GLIDE AORTIC CANNULA: Brand: EDWARDS LIFESCIENCES EZ GLIDE AORTIC CANNULA

## (undated) DEVICE — LABEL MED CARD SURG 4 IN PANEL STRL

## (undated) DEVICE — SURGICAL PROCEDURE PACK CRD SEHC

## (undated) DEVICE — SOLUTION IV IRRIG WATER 1000ML POUR BRL 2F7114

## (undated) DEVICE — AGENT HEMSTAT W4XL8IN OXIDIZED REGENERATED CELOS ABSRB

## (undated) DEVICE — CATHETER URETH 24FR L16IN BLLN 30CC SIL COUVELAIRE TIP 3 W

## (undated) DEVICE — TUBING, SUCTION, 3/16" X 12', STRAIGHT: Brand: MEDLINE

## (undated) DEVICE — 4-PORT MANIFOLD: Brand: NEPTUNE 2

## (undated) DEVICE — GUIDEWIRE ENDO L150CM DIA0.035IN STR TIP

## (undated) DEVICE — CIRCON 21F CYSTO TRAY

## (undated) DEVICE — CIRCON 30/70 URO LENS

## (undated) DEVICE — TAPE ADH W2INXL10YD WHT PAPR GENTLE BRTH FLX COMFORTABLE

## (undated) DEVICE — AVID DUAL STAGE VENOUS DRAINAGE CANNULA: Brand: AVID DUAL STAGE VENOUS DRAINAGE CANNULA

## (undated) DEVICE — DRESSING FOAM W22XL25CM FILVE LAYR FOAM DP DEF SAFETAC

## (undated) DEVICE — SET SURG BASIN MAYO REUSABLE

## (undated) DEVICE — BLADE CLIPPER GEN PURP NS

## (undated) DEVICE — CATHETER,URETHRAL,REDRUBBER,STRL,18FR: Brand: MEDLINE

## (undated) DEVICE — LENS CYSTOSCOPE 30 DEG

## (undated) DEVICE — BLOWER COR ART L16.5CM PLAS SHFT MAL W/ MIST IV SET AXIUS

## (undated) DEVICE — STRAP,CATHETER,ADJBL FOAM,HOOK&LOOP: Brand: MEDLINE

## (undated) DEVICE — CATHETER URETH 24FR BLLN 30CC STD LTX 3 W TWO OPP DRNGE EYE

## (undated) DEVICE — RETRACTOR RULTRACT INTERN MAMMARY ARTERY

## (undated) DEVICE — SET CYSTOSCOPE 21FR

## (undated) DEVICE — 1.5L THIN WALL CAN: Brand: CRD

## (undated) DEVICE — SOLUTION IRRIG 3000ML 0.9% SOD CHL USP UROMATIC PLAS CONT

## (undated) DEVICE — SOLUTION IV 50ML 0.9% SOD CHL PLAS CONT USP VIAFLX

## (undated) DEVICE — DRAIN SURG SGL COLL PT TB FOR ATS BG OASIS

## (undated) DEVICE — Z DUP USE 2257490 ADHESIVE SKIN CLSRE 036ML TPCL 2CTL CNCRLTE HIGH VSCSTY DRMB

## (undated) DEVICE — SOLUTION IV IRRIG 1000ML POUR BTL 2F7114

## (undated) DEVICE — TIP APPL GEL PLT ENDO 5MMX32CM

## (undated) DEVICE — GUIDEWIRE VASC L180CM DIA0.035IN TIP L7CM PTFE S STL STR

## (undated) DEVICE — DISCONTINUED USE 320496 BATTERY 50-800-01 OR 50-800-03 SNGL USE

## (undated) DEVICE — MPS® DELIVERY SET W/ARREST AGENT AND ADDITIVE CASSETTES, HEAT EXCHANGER & 10 FT. DELIVERY TUBING: Brand: MPS

## (undated) DEVICE — SET SURG BASIN OPEN HEART NO  1 REUSABLE

## (undated) DEVICE — CATH GUIDE

## (undated) DEVICE — DRAIN CHN 19FR L0.25MM DIA6.3MM SIL RND HUBLESS FULL FLUT

## (undated) DEVICE — GARMENT,MEDLINE,DVT,INT,CALF,MED, GEN2: Brand: MEDLINE

## (undated) DEVICE — COVER,LIGHT HANDLE,FLX,1/PK: Brand: MEDLINE INDUSTRIES, INC.

## (undated) DEVICE — PERFUSION PACK CUST OPN HRT

## (undated) DEVICE — Device: Brand: CLEANCUT ROTATING AORTIC PUNCH

## (undated) DEVICE — TOWEL,OR,DSP,ST,WHITE,DLX,4/PK,20PK/CS: Brand: MEDLINE

## (undated) DEVICE — S-CURVE URETHRAL DILATOR SET WITH AQ, HYDROPHILIC COATING: Brand: S~CURVE

## (undated) DEVICE — GLOVE SURG SZ 6 THK91MIL LTX FREE SYN POLYISOPRENE ANTI

## (undated) DEVICE — GOWN,SIRUS,FABRNF,L,20/CS: Brand: MEDLINE

## (undated) DEVICE — CANNULA PERF 7FR L5.5IN AORT ROOT RADPQ STD TIP W/ VENT LN

## (undated) DEVICE — E-Z CLEAN, NON-STICK, PTFE COATED, ELECTROSURGICAL BLADE ELECTRODE, MODIFIED EXTENDED INSULATION, 6.5 INCH (16.5 CM): Brand: MEGADYNE

## (undated) DEVICE — GLOVE ORANGE PI 7   MSG9070

## (undated) DEVICE — PICO 7 10CM X 30CM: Brand: PICO™ 7

## (undated) DEVICE — CLIP INT SM TI EZ LD LIG SYS WECK HORZ

## (undated) DEVICE — GOWN,SIRUS,NONRNF,SETINSLV,XL,20/CS: Brand: MEDLINE

## (undated) DEVICE — DRAIN SURG L3/8-1/2IN DIA3/16IN SIL CARD CONN 1:1 BLAK

## (undated) DEVICE — SET ACB VEIN